# Patient Record
Sex: FEMALE | Race: BLACK OR AFRICAN AMERICAN | Employment: OTHER | URBAN - METROPOLITAN AREA
[De-identification: names, ages, dates, MRNs, and addresses within clinical notes are randomized per-mention and may not be internally consistent; named-entity substitution may affect disease eponyms.]

---

## 2017-02-21 RX ORDER — AMLODIPINE BESYLATE 10 MG/1
TABLET ORAL
Qty: 60 TAB | Refills: 5 | Status: SHIPPED | OUTPATIENT
Start: 2017-02-21 | End: 2018-01-25 | Stop reason: SDUPTHER

## 2017-02-21 NOTE — TELEPHONE ENCOUNTER
Requested Prescriptions     Pending Prescriptions Disp Refills    amLODIPine (NORVASC) 10 mg tablet 60 Tab 5     Sig: TAKE 1 TABLET BY MOUTH DAILY    insulin NPH (HUMULIN N) 100 unit/mL injection 20 mL 5

## 2017-06-29 RX ORDER — ATENOLOL 50 MG/1
TABLET ORAL
Qty: 180 TAB | Refills: 0 | Status: SHIPPED | OUTPATIENT
Start: 2017-06-29 | End: 2017-10-29 | Stop reason: SDUPTHER

## 2017-06-29 NOTE — TELEPHONE ENCOUNTER
Pharmacy request, last OV 11/2016, next scheduled 7/5/17    Pharmacy states they have faxed multiple requests.     .  Requested Prescriptions     Pending Prescriptions Disp Refills    atenolol (TENORMIN) 50 mg tablet 180 Tab 1     Sig: TAKE 1 TABLET BY MOUTH TWO TIMES A DAY

## 2017-07-26 ENCOUNTER — OFFICE VISIT (OUTPATIENT)
Dept: INTERNAL MEDICINE CLINIC | Age: 82
End: 2017-07-26

## 2017-07-26 ENCOUNTER — HOSPITAL ENCOUNTER (OUTPATIENT)
Dept: LAB | Age: 82
Discharge: HOME | End: 2017-07-26
Payer: MEDICARE

## 2017-07-26 VITALS
BODY MASS INDEX: 26.39 KG/M2 | WEIGHT: 158.4 LBS | HEIGHT: 65 IN | OXYGEN SATURATION: 99 % | DIASTOLIC BLOOD PRESSURE: 64 MMHG | RESPIRATION RATE: 16 BRPM | TEMPERATURE: 95.9 F | SYSTOLIC BLOOD PRESSURE: 181 MMHG | HEART RATE: 52 BPM

## 2017-07-26 DIAGNOSIS — Z79.4 TYPE 2 DIABETES MELLITUS WITHOUT COMPLICATION, WITH LONG-TERM CURRENT USE OF INSULIN (HCC): ICD-10-CM

## 2017-07-26 DIAGNOSIS — I10 ESSENTIAL HYPERTENSION: ICD-10-CM

## 2017-07-26 DIAGNOSIS — E78.00 PURE HYPERCHOLESTEROLEMIA: ICD-10-CM

## 2017-07-26 DIAGNOSIS — E11.9 TYPE 2 DIABETES MELLITUS WITHOUT COMPLICATION, WITH LONG-TERM CURRENT USE OF INSULIN (HCC): Primary | ICD-10-CM

## 2017-07-26 DIAGNOSIS — Z79.4 TYPE 2 DIABETES MELLITUS WITHOUT COMPLICATION, WITH LONG-TERM CURRENT USE OF INSULIN (HCC): Primary | ICD-10-CM

## 2017-07-26 DIAGNOSIS — E11.9 TYPE 2 DIABETES MELLITUS WITHOUT COMPLICATION, WITH LONG-TERM CURRENT USE OF INSULIN (HCC): ICD-10-CM

## 2017-07-26 DIAGNOSIS — R21 RASH: ICD-10-CM

## 2017-07-26 LAB
ALBUMIN SERPL BCP-MCNC: 3.4 G/DL (ref 3.4–5)
ALBUMIN/GLOB SERPL: 0.9 {RATIO} (ref 0.8–1.7)
ALP SERPL-CCNC: 188 U/L (ref 45–117)
ALT SERPL-CCNC: 25 U/L (ref 13–56)
ANION GAP BLD CALC-SCNC: 9 MMOL/L (ref 3–18)
AST SERPL W P-5'-P-CCNC: 14 U/L (ref 15–37)
BILIRUB SERPL-MCNC: 0.6 MG/DL (ref 0.2–1)
BUN SERPL-MCNC: 30 MG/DL (ref 7–18)
BUN/CREAT SERPL: 22 (ref 12–20)
CALCIUM SERPL-MCNC: 9.2 MG/DL (ref 8.5–10.1)
CHLORIDE SERPL-SCNC: 104 MMOL/L (ref 100–108)
CHOLEST SERPL-MCNC: 113 MG/DL
CO2 SERPL-SCNC: 26 MMOL/L (ref 21–32)
CREAT SERPL-MCNC: 1.37 MG/DL (ref 0.6–1.3)
CREAT UR-MCNC: 48.15 MG/DL (ref 30–125)
ERYTHROCYTE [DISTWIDTH] IN BLOOD BY AUTOMATED COUNT: 17.8 % (ref 11.6–14.5)
EST. AVERAGE GLUCOSE BLD GHB EST-MCNC: 177 MG/DL
GLOBULIN SER CALC-MCNC: 3.8 G/DL (ref 2–4)
GLUCOSE SERPL-MCNC: 125 MG/DL (ref 74–99)
HBA1C MFR BLD: 7.8 % (ref 4.2–5.6)
HCT VFR BLD AUTO: 33 % (ref 35–45)
HDLC SERPL-MCNC: 42 MG/DL (ref 40–60)
HDLC SERPL: 2.7 {RATIO} (ref 0–5)
HGB BLD-MCNC: 10.6 G/DL (ref 12–16)
LDLC SERPL CALC-MCNC: 55.4 MG/DL (ref 0–100)
LIPID PROFILE,FLP: NORMAL
MCH RBC QN AUTO: 24.6 PG (ref 24–34)
MCHC RBC AUTO-ENTMCNC: 32.1 G/DL (ref 31–37)
MCV RBC AUTO: 76.6 FL (ref 74–97)
MICROALBUMIN UR-MCNC: 31.1 MG/DL (ref 0–3)
MICROALBUMIN/CREAT UR-RTO: 646 MG/G (ref 0–30)
PLATELET # BLD AUTO: 262 K/UL (ref 135–420)
POTASSIUM SERPL-SCNC: 4.1 MMOL/L (ref 3.5–5.5)
PROT SERPL-MCNC: 7.2 G/DL (ref 6.4–8.2)
RBC # BLD AUTO: 4.31 M/UL (ref 4.2–5.3)
SODIUM SERPL-SCNC: 139 MMOL/L (ref 136–145)
TRIGL SERPL-MCNC: 78 MG/DL (ref ?–150)
VLDLC SERPL CALC-MCNC: 15.6 MG/DL
WBC # BLD AUTO: 10.1 K/UL (ref 4.6–13.2)

## 2017-07-26 PROCEDURE — 82043 UR ALBUMIN QUANTITATIVE: CPT | Performed by: INTERNAL MEDICINE

## 2017-07-26 PROCEDURE — 36415 COLL VENOUS BLD VENIPUNCTURE: CPT | Performed by: INTERNAL MEDICINE

## 2017-07-26 PROCEDURE — 80061 LIPID PANEL: CPT | Performed by: INTERNAL MEDICINE

## 2017-07-26 PROCEDURE — 85027 COMPLETE CBC AUTOMATED: CPT | Performed by: INTERNAL MEDICINE

## 2017-07-26 PROCEDURE — 83036 HEMOGLOBIN GLYCOSYLATED A1C: CPT | Performed by: INTERNAL MEDICINE

## 2017-07-26 PROCEDURE — 80053 COMPREHEN METABOLIC PANEL: CPT | Performed by: INTERNAL MEDICINE

## 2017-07-26 RX ORDER — SIMVASTATIN 20 MG/1
TABLET, FILM COATED ORAL
Qty: 45 TAB | Refills: 3 | Status: SHIPPED | OUTPATIENT
Start: 2017-07-26 | End: 2018-04-25 | Stop reason: SDUPTHER

## 2017-07-26 RX ORDER — BETAMETHASONE DIPROPIONATE 0.5 MG/G
CREAM TOPICAL 2 TIMES DAILY
Qty: 45 G | Refills: 1 | Status: SHIPPED | OUTPATIENT
Start: 2017-07-26

## 2017-07-26 NOTE — PROGRESS NOTES
Pt presents today for F/U hypertension, diabetes, patient puja reports having sore on her legs     Pt preferred language for health care discussion is english. Is someone accompanying this pt? no    Is the patient using any DME equipment during [address]? Depression Screening completed. Learning Assessment completed. Abuse Screening completed. Health Maintenance reviewed and discussed per provider. Advance Directive:  1. Do you have an advance directive in place? Patient Reply:     Coordination of Care:  1. Have you been to the ER, urgent care clinic since your last visit? Hospitalized since your last visit? Yes [facility] for hand injury. 2. Have you seen or consulted any other health care providers outside of the [address] [place] [address] since your last visit? Include any pap smears or colon screening.  no

## 2017-07-26 NOTE — PATIENT INSTRUCTIONS

## 2017-07-26 NOTE — MR AVS SNAPSHOT
Visit Information Date & Time Provider Department Dept. Phone Encounter #  
 7/26/2017 11:45 AM Ayad Morris [facility] [phone] [id number] Follow-up Instructions Return in about 3 months (around 10/26/2017) for hypertension. Upcoming Health Maintenance Date Due  
 MEDICARE YEARLY EXAM 6/19/2015 LIPID PANEL Q1 10/29/2016 EYE EXAM RETINAL OR DILATED Q1 12/3/2016 MICROALBUMIN Q1 1/29/2017 HEMOGLOBIN A1C Q6M 5/18/2017 INFLUENZA AGE 9 TO ADULT 8/1/2017 FOOT EXAM Q1 11/18/2017 GLAUCOMA SCREENING Q2Y 12/3/2017 DTaP/Tdap/Td series (2 - Td) 7/26/2027 Allergies as of 7/26/2017  Review Complete On: 7/26/2017 By: Tereza Jaeger LPN Severity Noted Reaction Type Reactions Aspirin  10/29/2015    Nausea Only Bactrim [Sulfamethoprim]  03/18/2014    Nausea Only And dizziness and loss of appetite Pcn [Penicillins]  02/16/2012    Nausea and Vomiting, Hives Sulfamethoxazole-trimethoprim  10/29/2015    Itching Tetanus Toxoid,fluid  10/29/2015    Hives Tetracycline  10/29/2015    Nausea Only Tetracyclines  03/18/2014    Other (comments) Upset stomach Tramadol  08/10/2015    Other (comments) Nausea and vomiting Current Immunizations  Reviewed on 11/18/2016 Name Date Influenza High Dose Vaccine PF 11/18/2016  3:08 PM, 10/29/2015 Influenza Vaccine 9/18/2014 12:32 PM  
 Influenza Vaccine Split 9/19/2012 Pneumococcal Conjugate (PCV-13) 5/4/2016 Pneumococcal Polysaccharide (PPSV-23) 12/17/2013  2:19 PM  
  
 Not reviewed this visit You Were Diagnosed With   
  
 Codes Comments Type 2 diabetes mellitus without complication, with long-term current use of insulin (HCC)    -  Primary ICD-10-CM: E11.9, Z79.4 ICD-9-CM: 250.00, V58.67 Essential hypertension     ICD-10-CM: I10 
ICD-9-CM: 401.9 Pure hypercholesterolemia     ICD-10-CM: E78.00 ICD-9-CM: 272.0 Rash     ICD-10-CM: R21 
ICD-9-CM: 782.1 Vitals BP Pulse Temp Resp Height(growth percentile) Weight(growth percentile) 181/64 (BP 1 Location: Left arm, BP Patient Position: Sitting) (!) 52 95.9 °F (35.5 °C) (Oral) 16 5' 5\" (1.651 m) 158 lb 6.4 oz (71.8 kg) SpO2 BMI OB Status Smoking Status 99% 26.36 kg/m2 Postmenopausal Current Every Day Smoker Vitals History BMI and BSA Data Body Mass Index Body Surface Area  
 26.36 kg/m 2 1.81 m 2 Preferred Pharmacy Pharmacy Name Phone [id number] - [facility] [address] [facility] [phone] Your Updated Medication List  
  
   
This list is accurate as of: 7/26/17 12:23 PM.  Always use your most recent med list. amLODIPine 10 mg tablet Commonly known as:  Mendy Chimes TAKE 1 TABLET BY MOUTH DAILY  
  
 atenolol 50 mg tablet Commonly known as:  TENORMIN  
TAKE 1 TABLET BY MOUTH TWO TIMES A DAY  
  
 betamethasone dipropionate 0.05 % topical cream  
Commonly known as:  Lourena Plater Apply  to affected area two (2) times a day. insulin lispro 100 unit/mL injection Commonly known as:  HUMALOG  
5 units before the  breakfast  
  
 insulin  unit/mL injection Commonly known as:  HumuLIN N  
INJECT 25 UNITS BY SUBCUTANEOUSLY IN THE MORNING AND 15 UNITS SUBCUTANEOUSLY IN THE EVENING  
  
 losartan-hydroCHLOROthiazide 100-25 mg per tablet Commonly known as:  HYZAAR Take 1 Tab by mouth daily. omeprazole 20 mg capsule Commonly known as:  PRILOSEC Take 20 mg by mouth daily. simvastatin 20 mg tablet Commonly known as:  ZOCOR  
TAKE 1 TABLET BY MOUTH EVERY OTHER DAY Prescriptions Sent to Pharmacy Refills  
 simvastatin (ZOCOR) 20 mg tablet 3 Sig: TAKE 1 TABLET BY MOUTH EVERY OTHER DAY Class: Normal  
 Pharmacy: [facility] [address], [address] [facility] Ph #: [phone] betamethasone dipropionate (DIPROSONE) 0.05 % topical cream 1 Sig: Apply  to affected area two (2) times a day. Class: Normal  
 Pharmacy: [facility], [address] [facility] #: [phone] Route: Topical  
  
Follow-up Instructions Return in about 3 months (around 10/26/2017) for hypertension. To-Do List   
 07/26/2017 Lab:  CBC W/O DIFF   
  
 07/26/2017 Lab:  HEMOGLOBIN A1C WITH EAG   
  
 07/26/2017 Lab:  LIPID PANEL   
  
 07/26/2017 Lab:  METABOLIC PANEL, COMPREHENSIVE   
  
 07/26/2017 Lab:  MICROALBUMIN, UR, RAND W/ MICROALBUMIN/CREA RATIO Patient Instructions DASH Diet: Care Instructions Your Care Instructions The DASH diet is an eating plan that can help lower your blood pressure. DASH stands for Dietary Approaches to Stop Hypertension. Hypertension is high blood pressure. The DASH diet focuses on eating foods that are high in calcium, potassium, and magnesium. These nutrients can lower blood pressure. The foods that are highest in these nutrients are fruits, vegetables, low-fat dairy products, nuts, seeds, and legumes. But taking calcium, potassium, and magnesium supplements instead of eating foods that are high in those nutrients does not have the same effect. The DASH diet also includes whole grains, fish, and poultry. The DASH diet is one of several lifestyle changes your doctor may recommend to lower your high blood pressure. Your doctor may also want you to decrease the amount of sodium in your diet. Lowering sodium while following the DASH diet can lower blood pressure even further than just the DASH diet alone. Follow-up care is a key part of your treatment and safety. Be sure to make and go to all appointments, and call your doctor if you are having problems. It's also a good idea to know your test results and keep a list of the medicines you take. How can you care for yourself at home? Following the DASH diet · Eat 4 to 5 servings of fruit each day. A serving is 1 medium-sized piece of fruit, ½ cup chopped or canned fruit, 1/4 cup dried fruit, or 4 ounces (½ cup) of fruit juice. Choose fruit more often than fruit juice. · Eat 4 to 5 servings of vegetables each day. A serving is 1 cup of lettuce or raw leafy vegetables, ½ cup of chopped or cooked vegetables, or 4 ounces (½ cup) of vegetable juice. Choose vegetables more often than vegetable juice. · Get 2 to 3 servings of low-fat and fat-free dairy each day. A serving is 8 ounces of milk, 1 cup of yogurt, or 1 ½ ounces of cheese. · Eat 6 to 8 servings of grains each day. A serving is 1 slice of bread, 1 ounce of dry cereal, or ½ cup of cooked rice, pasta, or cooked cereal. Try to choose whole-grain products as much as possible. · Limit lean meat, poultry, and fish to 2 servings each day. A serving is 3 ounces, about the size of a deck of cards. · Eat 4 to 5 servings of nuts, seeds, and legumes (cooked dried beans, lentils, and split peas) each week. A serving is 1/3 cup of nuts, 2 tablespoons of seeds, or ½ cup of cooked beans or peas. · Limit fats and oils to 2 to 3 servings each day. A serving is 1 teaspoon of vegetable oil or 2 tablespoons of salad dressing. · Limit sweets and added sugars to 5 servings or less a week. A serving is 1 tablespoon jelly or jam, ½ cup sorbet, or 1 cup of lemonade. · Eat less than 2,300 milligrams (mg) of sodium a day. If you limit your sodium to 1,500 mg a day, you can lower your blood pressure even more. Tips for success · Start small. Do not try to make dramatic changes to your diet all at once. You might feel that you are missing out on your favorite foods and then be more likely to not follow the plan. Make small changes, and stick with them. Once those changes become habit, add a few more changes. · Try some of the following: ¨ Make it a goal to eat a fruit or vegetable at every meal and at snacks. This will make it easy to get the recommended amount of fruits and vegetables each day. ¨ Try yogurt topped with fruit and nuts for a snack or healthy dessert. ¨ Add lettuce, tomato, cucumber, and onion to sandwiches. ¨ Combine a ready-made pizza crust with low-fat mozzarella cheese and lots of vegetable toppings. Try using tomatoes, squash, spinach, broccoli, carrots, cauliflower, and onions. ¨ Have a variety of cut-up vegetables with a low-fat dip as an appetizer instead of chips and dip. ¨ Sprinkle sunflower seeds or chopped almonds over salads. Or try adding chopped walnuts or almonds to cooked vegetables. ¨ Try some vegetarian meals using beans and peas. Add garbanzo or kidney beans to salads. Make burritos and tacos with mashed brooks beans or black beans. Where can you learn more? Go to http://mukund-kun.info/. Enter U881 in the search box to learn more about \"DASH Diet: Care Instructions. \" Current as of: April 3, 2017 Content Version: 11.3 © 6700-5210 [employer]. Care instructions adapted under license by [employer] (which disclaims liability or warranty for this information). If you have questions about a medical condition or this instruction, always ask your healthcare professional. [place] [address] any warranty or liability for your use of this information. Introducing [facility]! Disha Lee introduces [employer]uto patient portal. Now you can access parts of your medical record, email your doctor's office, and request medication refills online. 1. In your internet browser, go to [email or web address] 2. Click on the First Time User? Click Here link in the Sign In box. You will see the New Member Sign Up page. 3. Enter your [employer]uto Access Code exactly as it appears below. You will not need to use this code after youve completed the sign-up process.  If you do not sign up before the expiration date, you must request a new code. · BUKA Access Code: [email or web address] Expires: 10/24/2017 12:22 PM 
 
4. Enter the last four digits of your Social Security Number (xxxx) and Date of Birth (mm/dd/yyyy) as indicated and click Submit. You will be taken to the next sign-up page. 5. Create a BUKA ID. This will be your BUKA login ID and cannot be changed, so think of one that is secure and easy to remember. 6. Create a BUKA password. You can change your password at any time. 7. Enter your Password Reset Question and Answer. This can be used at a later time if you forget your password. 8. Enter your e-mail address. You will receive e-mail notification when new information is available in [address] 9. Click Sign Up. You can now view and download portions of your medical record. 10. Click the Download Summary menu link to download a portable copy of your medical information. If you have questions, please visit the Frequently Asked Questions section of the BUKA website. Remember, BUKA is NOT to be used for urgent needs. For medical emergencies, dial 911. Now available from your iPhone and Android! Please provide this summary of care documentation to your next provider. Your primary care clinician is listed as Alison Singer. If you have any questions after today's visit, please call [phone].

## 2017-07-26 NOTE — PROGRESS NOTES
HISTORY OF PRESENT ILLNESS  Tan Mota is a 80 y.o. female. HPI Comments: 79 yo female here for f/u of DM, HTN, HLD. HTN always elevated at visits. She checks at home with SBP readings typically in 150s. No CP, SOB. On multiple medications. HLD: tolerates simvastatin if taken every other day. Needs refill  DM has been doing well at home ranging . She has noticed rash B calves over the past few months. Has been using OTC hydrocortisone, hydrogen peroxide without improvement. + pruritus. Review of Systems   Constitutional: Negative for chills, fever and weight loss. HENT: Negative for congestion. Eyes: Negative for blurred vision and pain. Respiratory: Negative for cough and shortness of breath. Cardiovascular: Positive for leg swelling (trace). Negative for chest pain and palpitations. Gastrointestinal: Negative for heartburn, nausea and vomiting. Musculoskeletal: Negative for myalgias. Skin: Positive for itching and rash. Neurological: Negative for dizziness, tingling and headaches. Psychiatric/Behavioral: Negative for depression. The patient is not nervous/anxious. Past Medical History:   Diagnosis Date    Diabetes ([facility] [address]     Hypertension     Other and unspecified hyperlipidemia 2/16/2012     Current Outpatient Prescriptions on File Prior to Visit   Medication Sig Dispense Refill    atenolol (TENORMIN) 50 mg tablet TAKE 1 TABLET BY MOUTH TWO TIMES A  Tab 0    amLODIPine (NORVASC) 10 mg tablet TAKE 1 TABLET BY MOUTH DAILY 60 Tab 5    insulin NPH (HUMULIN N) 100 unit/mL injection INJECT 25 UNITS BY SUBCUTANEOUSLY IN THE MORNING AND 15 UNITS SUBCUTANEOUSLY IN THE EVENING 20 mL 5    insulin lispro (HUMALOG) 100 unit/mL injection 5 units before the  breakfast 1 Vial 5    losartan-hydrochlorothiazide (HYZAAR) 100-25 mg per tablet Take 1 Tab by mouth daily.  90 Tab 5    simvastatin (ZOCOR) 20 mg tablet TAKE 1 TABLET BY MOUTH EVERY OTHER DAY 15 Tab 5    naproxen (NAPROSYN) 375 mg tablet Take 1 Tab by mouth two (2) times daily (with meals). 60 Tab 0    HYDROcodone-acetaminophen (NORCO) 7.5-325 mg per tablet Take 1 Tab by mouth every six (6) hours as needed for Pain. Max Daily Amount: 4 Tabs. 100 Tab 0    omeprazole (PRILOSEC) 20 mg capsule Take 20 mg by mouth daily.  ferrous sulfate 325 mg (65 mg iron) tablet Take 1 tablet by mouth Daily (before breakfast). 90 tablet 5     No current facility-administered medications on file prior to visit. Social History   Substance Use Topics    Smoking status: Current Every Day Smoker     Packs/day: 0.50     Years: 40.00    Smokeless tobacco: Never Used    Alcohol use No     Physical Exam   Constitutional: She appears well-developed and well-nourished. No distress. /64 (BP 1 Location: Left arm, BP Patient Position: Sitting)  Pulse (!) 52  Temp 95.9 °F (35.5 °C) (Oral)   Resp 16  Ht 5' 5\" (1.651 m)  Wt 158 lb 6.4 oz (71.8 kg)  SpO2 99%  BMI 26.36 kg/m2     Eyes: EOM are normal. Right eye exhibits no discharge. Left eye exhibits no discharge. No scleral icterus. Neck: Neck supple. Cardiovascular: Normal rate, regular rhythm and normal heart sounds. Exam reveals no gallop and no friction rub. No murmur heard. Pulmonary/Chest: Effort normal and breath sounds normal. No respiratory distress. She has no wheezes. She has no rales. Musculoskeletal: She exhibits edema (trace). She exhibits no tenderness. Lymphadenopathy:     She has no cervical adenopathy. Neurological: She is alert. She exhibits normal muscle tone. Skin: Skin is warm and dry. Psychiatric: She has a normal mood and affect.      Lab Results   Component Value Date/Time    Hemoglobin A1c 8.8 09/18/2014 12:17 PM    Hemoglobin A1c (POC) 7.7 11/18/2016 03:07 PM     Lab Results   Component Value Date/Time    Cholesterol, total 137 10/29/2015 02:02 PM    HDL Cholesterol 31 10/29/2015 02:02 PM    LDL, calculated 85 10/29/2015 02:02 PM    VLDL, calculated 21 10/29/2015 02:02 PM    Triglyceride 107 10/29/2015 02:02 PM     Lab Results   Component Value Date/Time    Sodium 138 08/17/2016 02:36 PM    Potassium 4.1 08/17/2016 02:36 PM    Chloride 103 08/17/2016 02:36 PM    CO2 27 08/17/2016 02:36 PM    Anion gap 8 08/17/2016 02:36 PM    Glucose 141 08/17/2016 02:36 PM    BUN 23 08/17/2016 02:36 PM    Creatinine 1.32 08/17/2016 02:36 PM    BUN/Creatinine ratio 17 08/17/2016 02:36 PM    GFR est AA 47 08/17/2016 02:36 PM    GFR est non-AA 38 08/17/2016 02:36 PM    Calcium 9.5 08/17/2016 02:36 PM    Bilirubin, total 0.6 08/17/2016 02:36 PM    AST (SGOT) 15 08/17/2016 02:36 PM    Alk. phosphatase 179 08/17/2016 02:36 PM    Protein, total 7.2 08/17/2016 02:36 PM    Albumin 3.5 08/17/2016 02:36 PM    Globulin 3.7 08/17/2016 02:36 PM    A-G Ratio 0.9 08/17/2016 02:36 PM    ALT (SGPT) 25 08/17/2016 02:36 PM     ASSESSMENT and PLAN    ICD-10-CM ICD-9-CM    1. Type 2 diabetes mellitus without complication, with long-term current use of insulin (HCC) E11.9 250.00 HEMOGLOBIN A1C WITH EAG    Z79.4 V58.67 MICROALBUMIN, UR, RAND W/ MICROALBUMIN/CREA RATIO   2. Essential hypertension H86 355.6 METABOLIC PANEL, COMPREHENSIVE      CBC W/O DIFF   3. Pure hypercholesterolemia E78.00 272.0 LIPID PANEL   4. Rash R21 782.1    Repeat labs today. Discussed switching atenolol to metoprolol to see if this improves BP, but she prefers to monitor on current medication. Will call if BP elevates. Simvastatin refilled. Will try betamethasone for rash. She will call if this is not effective.

## 2017-07-31 NOTE — PROGRESS NOTES
Please let her know her kidney function has is stable, but she has protein in her urine. Has she seen a nephrologist in the past? If not, is she willing to see one? It is important to control her blood pressure and diabetes.

## 2017-08-01 ENCOUNTER — TELEPHONE (OUTPATIENT)
Dept: INTERNAL MEDICINE CLINIC | Age: 82
End: 2017-08-01

## 2017-08-01 DIAGNOSIS — R80.9 CONTROLLED TYPE 2 DIABETES MELLITUS WITH MICROALBUMINURIA, WITH LONG-TERM CURRENT USE OF INSULIN (HCC): Primary | ICD-10-CM

## 2017-08-01 DIAGNOSIS — Z79.4 CONTROLLED TYPE 2 DIABETES MELLITUS WITH MICROALBUMINURIA, WITH LONG-TERM CURRENT USE OF INSULIN (HCC): Primary | ICD-10-CM

## 2017-08-01 DIAGNOSIS — E11.29 CONTROLLED TYPE 2 DIABETES MELLITUS WITH MICROALBUMINURIA, WITH LONG-TERM CURRENT USE OF INSULIN (HCC): Primary | ICD-10-CM

## 2017-08-01 NOTE — TELEPHONE ENCOUNTER
2 pt. Identifiers confirmed. Pt. Notified of below. Pt. States she has not seen a nephrologist and is okay wit a referral to f/u. No other questions/concerns at this time.

## 2017-08-01 NOTE — TELEPHONE ENCOUNTER
----- Message from Julio Adrian MD sent at 7/31/2017  3:52 PM EDT -----  Please let her know her kidney function has is stable, but she has protein in her urine. Has she seen a nephrologist in the past? If not, is she willing to see one? It is important to control her blood pressure and diabetes.

## 2017-10-26 ENCOUNTER — OFFICE VISIT (OUTPATIENT)
Dept: INTERNAL MEDICINE CLINIC | Age: 82
End: 2017-10-26

## 2017-10-26 VITALS
RESPIRATION RATE: 12 BRPM | HEART RATE: 53 BPM | BODY MASS INDEX: 26.62 KG/M2 | SYSTOLIC BLOOD PRESSURE: 168 MMHG | TEMPERATURE: 97 F | DIASTOLIC BLOOD PRESSURE: 61 MMHG | HEIGHT: 65 IN | WEIGHT: 159.8 LBS | OXYGEN SATURATION: 98 %

## 2017-10-26 DIAGNOSIS — Z79.4 TYPE 2 DIABETES MELLITUS WITHOUT COMPLICATION, WITH LONG-TERM CURRENT USE OF INSULIN (HCC): ICD-10-CM

## 2017-10-26 DIAGNOSIS — E11.9 TYPE 2 DIABETES MELLITUS WITHOUT COMPLICATION, WITH LONG-TERM CURRENT USE OF INSULIN (HCC): ICD-10-CM

## 2017-10-26 DIAGNOSIS — N18.2 CKD (CHRONIC KIDNEY DISEASE) STAGE 2, GFR 60-89 ML/MIN: ICD-10-CM

## 2017-10-26 DIAGNOSIS — Z23 ENCOUNTER FOR IMMUNIZATION: ICD-10-CM

## 2017-10-26 DIAGNOSIS — I10 ESSENTIAL HYPERTENSION: Primary | ICD-10-CM

## 2017-10-26 NOTE — PROGRESS NOTES
Pt presents today for F/U hypertension     Pt preferred language for health care discussion is english. Is someone accompanying this pt? no    Depression Screening completed. yes    Health Maintenance reviewed and discussed per provider. yes    Advance Directive:  1. Do you have an advance directive in place? Patient Reply:No patient has paper at home. Coordination of Care:  1. Have you been to the ER, urgent care clinic since your last visit? Hospitalized since your last visit? no    2. Have you seen or consulted any other health care providers outside of the [address] [place] [address] since your last visit? Include any pap smears or colon screening.  no

## 2017-10-26 NOTE — MR AVS SNAPSHOT
Visit Information Date & Time Provider Department Dept. Phone Encounter #  
 10/26/2017  1:00 PM Jenifer Lucas, [address] [phone] [id number] Follow-up Instructions Return in about 3 months (around 1/26/2018), or if symptoms worsen or fail to improve, for Medicare Wellness Visit, diabetes, hypertension. Upcoming Health Maintenance Date Due  
 FOOT EXAM Q1 11/18/2017 MEDICARE YEARLY EXAM 12/1/2017* EYE EXAM RETINAL OR DILATED Q1 12/15/2017 HEMOGLOBIN A1C Q6M 1/26/2018 MICROALBUMIN Q1 7/26/2018 LIPID PANEL Q1 7/26/2018 GLAUCOMA SCREENING Q2Y 12/15/2018 DTaP/Tdap/Td series (2 - Td) 7/26/2027 *Topic was postponed. The date shown is not the original due date. Allergies as of 10/26/2017  Review Complete On: 10/26/2017 By: Jermaine Dunn LPN Severity Noted Reaction Type Reactions Aspirin  10/29/2015    Nausea Only Bactrim [Sulfamethoprim]  03/18/2014    Nausea Only And dizziness and loss of appetite Pcn [Penicillins]  02/16/2012    Nausea and Vomiting, Hives Sulfamethoxazole-trimethoprim  10/29/2015    Itching Tetanus Toxoid,fluid  10/29/2015    Hives Tetracycline  10/29/2015    Nausea Only Tetracyclines  03/18/2014    Other (comments) Upset stomach Tramadol  08/10/2015    Other (comments) Nausea and vomiting Current Immunizations  Reviewed on 11/18/2016 Name Date Influenza High Dose Vaccine PF  Incomplete, 11/18/2016  3:08 PM, 10/29/2015 Influenza Vaccine 9/18/2014 12:32 PM  
 Influenza Vaccine Split 9/19/2012 Pneumococcal Conjugate (PCV-13) 5/4/2016 Pneumococcal Polysaccharide (PPSV-23) 12/17/2013  2:19 PM  
  
 Not reviewed this visit You Were Diagnosed With   
  
 Codes Comments Essential hypertension    -  Primary ICD-10-CM: I10 
ICD-9-CM: 401.9  Type 2 diabetes mellitus without complication, with long-term current use of insulin ([facility]     ICD-10-CM: E11.9, Z79.4 ICD-9-CM: 250.00, V58.67 CKD (chronic kidney disease) stage 2, GFR 60-89 ml/min     ICD-10-CM: N18.2 ICD-9-CM: 585.2 Encounter for immunization     ICD-10-CM: P35 ICD-9-CM: V03.89 Vitals BP Pulse Temp Resp Height(growth percentile) Weight(growth percentile)  
 168/61 (BP 1 Location: Left arm, BP Patient Position: Sitting) (!) 53 97 °F (36.1 °C) (Oral) 12 5' 5\" (1.651 m) 159 lb 12.8 oz (72.5 kg) SpO2 BMI OB Status Smoking Status 98% 26.59 kg/m2 Postmenopausal Current Every Day Smoker Vitals History BMI and BSA Data Body Mass Index Body Surface Area  
 26.59 kg/m 2 1.82 m 2 Preferred Pharmacy Pharmacy Name Phone [facility][id number] - [place] [address] [address] [facility] [phone] Your Updated Medication List  
  
   
This list is accurate as of: 10/26/17  1:33 PM.  Always use your most recent med list. amLODIPine 10 mg tablet Commonly known as:  [place] TAKE 1 TABLET BY MOUTH DAILY  
  
 atenolol 50 mg tablet Commonly known as:  TENORMIN  
TAKE 1 TABLET BY MOUTH TWO TIMES A DAY  
  
 betamethasone dipropionate 0.05 % topical cream  
Commonly known as:  [place] Sax Apply  to affected area two (2) times a day. insulin lispro 100 unit/mL injection Commonly known as:  HUMALOG  
5 units before the  breakfast  
  
 insulin  unit/mL injection Commonly known as:  HumuLIN N  
INJECT 25 UNITS BY SUBCUTANEOUSLY IN THE MORNING AND 15 UNITS SUBCUTANEOUSLY IN THE EVENING  
  
 losartan-hydroCHLOROthiazide 100-25 mg per tablet Commonly known as:  HYZAAR Take 1 Tab by mouth daily. simvastatin 20 mg tablet Commonly known as:  ZOCOR  
TAKE 1 TABLET BY MOUTH EVERY OTHER DAY We Performed the Following INFLUENZA VIRUS VACCINE, HIGH DOSE SEASONAL, PRESERVATIVE FREE [60559 CPT(R)] Follow-up Instructions Return in about 3 months (around 1/26/2018), or if symptoms worsen or fail to improve, for Medicare Wellness Visit, diabetes, hypertension. Patient Instructions DASH Diet: Care Instructions Your Care Instructions The DASH diet is an eating plan that can help lower your blood pressure. DASH stands for Dietary Approaches to Stop Hypertension. Hypertension is high blood pressure. The DASH diet focuses on eating foods that are high in calcium, potassium, and magnesium. These nutrients can lower blood pressure. The foods that are highest in these nutrients are fruits, vegetables, low-fat dairy products, nuts, seeds, and legumes. But taking calcium, potassium, and magnesium supplements instead of eating foods that are high in those nutrients does not have the same effect. The DASH diet also includes whole grains, fish, and poultry. The DASH diet is one of several lifestyle changes your doctor may recommend to lower your high blood pressure. Your doctor may also want you to decrease the amount of sodium in your diet. Lowering sodium while following the DASH diet can lower blood pressure even further than just the DASH diet alone. Follow-up care is a key part of your treatment and safety. Be sure to make and go to all appointments, and call your doctor if you are having problems. It's also a good idea to know your test results and keep a list of the medicines you take. How can you care for yourself at home? Following the DASH diet · Eat 4 to 5 servings of fruit each day. A serving is 1 medium-sized piece of fruit, ½ cup chopped or canned fruit, 1/4 cup dried fruit, or 4 ounces (½ cup) of fruit juice. Choose fruit more often than fruit juice. · Eat 4 to 5 servings of vegetables each day. A serving is 1 cup of lettuce or raw leafy vegetables, ½ cup of chopped or cooked vegetables, or 4 ounces (½ cup) of vegetable juice. Choose vegetables more often than vegetable juice. · Get 2 to 3 servings of low-fat and fat-free dairy each day. A serving is 8 ounces of milk, 1 cup of yogurt, or 1 ½ ounces of cheese. · Eat 6 to 8 servings of grains each day. A serving is 1 slice of bread, 1 ounce of dry cereal, or ½ cup of cooked rice, pasta, or cooked cereal. Try to choose whole-grain products as much as possible. · Limit lean meat, poultry, and fish to 2 servings each day. A serving is 3 ounces, about the size of a deck of cards. · Eat 4 to 5 servings of nuts, seeds, and legumes (cooked dried beans, lentils, and split peas) each week. A serving is 1/3 cup of nuts, 2 tablespoons of seeds, or ½ cup of cooked beans or peas. · Limit fats and oils to 2 to 3 servings each day. A serving is 1 teaspoon of vegetable oil or 2 tablespoons of salad dressing. · Limit sweets and added sugars to 5 servings or less a week. A serving is 1 tablespoon jelly or jam, ½ cup sorbet, or 1 cup of lemonade. · Eat less than 2,300 milligrams (mg) of sodium a day. If you limit your sodium to 1,500 mg a day, you can lower your blood pressure even more. Tips for success · Start small. Do not try to make dramatic changes to your diet all at once. You might feel that you are missing out on your favorite foods and then be more likely to not follow the plan. Make small changes, and stick with them. Once those changes become habit, add a few more changes. · Try some of the following: ¨ Make it a goal to eat a fruit or vegetable at every meal and at snacks. This will make it easy to get the recommended amount of fruits and vegetables each day. ¨ Try yogurt topped with fruit and nuts for a snack or healthy dessert. ¨ Add lettuce, tomato, cucumber, and onion to sandwiches. ¨ Combine a ready-made pizza crust with low-fat mozzarella cheese and lots of vegetable toppings. Try using tomatoes, squash, spinach, broccoli, carrots, cauliflower, and onions. ¨ Have a variety of cut-up vegetables with a low-fat dip as an appetizer instead of chips and dip. ¨ Sprinkle sunflower seeds or chopped almonds over salads. Or try adding chopped walnuts or almonds to cooked vegetables. ¨ Try some vegetarian meals using beans and peas. Add garbanzo or kidney beans to salads. Make burritos and tacos with mashed brooks beans or black beans. Where can you learn more? Go to http://mukund-kun.info/. Enter C641 in the search box to learn more about \"DASH Diet: Care Instructions. \" Current as of: September 21, 2016 Content Version: 11.4 © 5902-0125 [employer]. Care instructions adapted under license by [employer] (which disclaims liability or warranty for this information). If you have questions about a medical condition or this instruction, always ask your healthcare professional. [place] [address] any warranty or liability for your use of this information. Introducing [facility] & HEALTH SERVICES! Ozzie White introduces [employer] patient portal. Now you can access parts of your medical record, email your doctor's office, and request medication refills online. 1. In your internet browser, go to [email or web address] 2. Click on the First Time User? Click Here link in the Sign In box. You will see the New Member Sign Up page. 3. Enter your [employer] Access Code exactly as it appears below. You will not need to use this code after youve completed the sign-up process. If you do not sign up before the expiration date, you must request a new code. · [employer] Access Code: [email or web address] Expires: 1/24/2018  1:32 PM 
 
4. Enter the last four digits of your Social Security Number (xxxx) and Date of Birth (mm/dd/yyyy) as indicated and click Submit. You will be taken to the next sign-up page. 5. Create a [employer] ID.  This will be your [employer] login ID and cannot be changed, so think of one that is secure and easy to remember. 6. Create a [employer] password. You can change your password at any time. 7. Enter your Password Reset Question and Answer. This can be used at a later time if you forget your password. 8. Enter your e-mail address. You will receive e-mail notification when new information is available in [address] 9. Click Sign Up. You can now view and download portions of your medical record. 10. Click the Download Summary menu link to download a portable copy of your medical information. If you have questions, please visit the Frequently Asked Questions section of the [employer] website. Remember, [employer] is NOT to be used for urgent needs. For medical emergencies, dial 911. Now available from your iPhone and Android! Please provide this summary of care documentation to your next provider. Your primary care clinician is listed as Bruce Lainez. If you have any questions after today's visit, please call [phone].

## 2017-10-26 NOTE — PROGRESS NOTES
HISTORY OF PRESENT ILLNESS  Deandre Rand is a 80 y.o. female. HPI Comments: 79 yo female here for f/u of HTN, DM, CKD. She wishes to hold on seeing nephrology at this time. She monitors her BP at home which ranges from 130s-160/60-70s at home. Denies CP, SOB. DM remains controlled when checked at home. Consistently < 150. Did have elevated microalbumin last visit. Review of Systems   Constitutional: Negative for chills, fever and weight loss. HENT: Negative for congestion and ear pain. Eyes: Negative for blurred vision and pain. Respiratory: Negative for cough and shortness of breath. Cardiovascular: Negative for chest pain, palpitations and leg swelling. Gastrointestinal: Negative for nausea and vomiting. Genitourinary: Negative for frequency and urgency. Musculoskeletal: Negative for joint pain and myalgias. Skin: Negative for itching and rash. Neurological: Negative for dizziness, tingling and headaches. Psychiatric/Behavioral: Negative for depression. The patient is not nervous/anxious. Past Medical History:   Diagnosis Date    Diabetes ([address]     Hypertension     Other and unspecified hyperlipidemia 2/16/2012     Current Outpatient Prescriptions on File Prior to Visit   Medication Sig Dispense Refill    simvastatin (ZOCOR) 20 mg tablet TAKE 1 TABLET BY MOUTH EVERY OTHER DAY 45 Tab 3    betamethasone dipropionate (DIPROSONE) 0.05 % topical cream Apply  to affected area two (2) times a day.  45 g 1    atenolol (TENORMIN) 50 mg tablet TAKE 1 TABLET BY MOUTH TWO TIMES A  Tab 0    amLODIPine (NORVASC) 10 mg tablet TAKE 1 TABLET BY MOUTH DAILY 60 Tab 5    insulin NPH (HUMULIN N) 100 unit/mL injection INJECT 25 UNITS BY SUBCUTANEOUSLY IN THE MORNING AND 15 UNITS SUBCUTANEOUSLY IN THE EVENING 20 mL 5    insulin lispro (HUMALOG) 100 unit/mL injection 5 units before the  breakfast 1 Vial 5    losartan-hydrochlorothiazide (HYZAAR) 100-25 mg per tablet Take 1 Tab by mouth daily. 90 Tab 5     No current facility-administered medications on file prior to visit. Social History   Substance Use Topics    Smoking status: Current Every Day Smoker     Packs/day: 0.50     Years: 40.00    Smokeless tobacco: Never Used    Alcohol use No     Physical Exam   Constitutional: She appears well-developed and well-nourished. No distress. /61 (BP 1 Location: Left arm, BP Patient Position: Sitting)  Pulse (!) 53  Temp 97 °F (36.1 °C) (Oral)   Resp 12  Ht 5' 5\" (1.651 m)  Wt 159 lb 12.8 oz (72.5 kg)  SpO2 98%  BMI 26.59 kg/m2     Eyes: EOM are normal. Right eye exhibits no discharge. Left eye exhibits no discharge. No scleral icterus. Neck: Neck supple. Cardiovascular: Normal rate, regular rhythm and normal heart sounds. Exam reveals no gallop and no friction rub. No murmur heard. Pulmonary/Chest: Effort normal and breath sounds normal. No respiratory distress. She has no wheezes. She has no rales. Musculoskeletal: She exhibits no edema or tenderness. Lymphadenopathy:     She has no cervical adenopathy. Neurological: She is alert. She exhibits normal muscle tone. Skin: Skin is warm and dry. Psychiatric: She has a normal mood and affect. Lab Results   Component Value Date/Time    Hemoglobin A1c 7.8 07/26/2017 12:33 PM    Hemoglobin A1c (POC) 7.7 11/18/2016 03:07 PM     Lab Results   Component Value Date/Time    Sodium 139 07/26/2017 12:33 PM    Potassium 4.1 07/26/2017 12:33 PM    Chloride 104 07/26/2017 12:33 PM    CO2 26 07/26/2017 12:33 PM    Anion gap 9 07/26/2017 12:33 PM    Glucose 125 07/26/2017 12:33 PM    BUN 30 07/26/2017 12:33 PM    Creatinine 1.37 07/26/2017 12:33 PM    BUN/Creatinine ratio 22 07/26/2017 12:33 PM    GFR est AA 45 07/26/2017 12:33 PM    GFR est non-AA 37 07/26/2017 12:33 PM    Calcium 9.2 07/26/2017 12:33 PM    Bilirubin, total 0.6 07/26/2017 12:33 PM    AST (SGOT) 14 07/26/2017 12:33 PM    Alk.  phosphatase 188 07/26/2017 12:33 PM    Protein, total 7.2 07/26/2017 12:33 PM    Albumin 3.4 07/26/2017 12:33 PM    Globulin 3.8 07/26/2017 12:33 PM    A-G Ratio 0.9 07/26/2017 12:33 PM    ALT (SGPT) 25 07/26/2017 12:33 PM       ASSESSMENT and PLAN    ICD-10-CM ICD-9-CM    1. Essential hypertension I10 401.9    2. Type 2 diabetes mellitus without complication, with long-term current use of insulin (HCC) E11.9 250.00     Z79.4 V58.67    3. CKD (chronic kidney disease) stage 2, GFR 60-89 ml/min N18.2 585.2    4. Encounter for immunization Z23 V03.89 INFLUENZA VIRUS VACCINE, HIGH DOSE SEASONAL, PRESERVATIVE FREE     Discussed possible medication change for improved BP control. She prefers to wait this time. Will monitor at home BID for next week or so and let me know if BP remains > 150. Discussed recent microalbumin level increased and need for continue control of HTN, DM  Glc is well controlled on home monitoring  Flu shot today.

## 2017-10-26 NOTE — PATIENT INSTRUCTIONS

## 2017-10-29 RX ORDER — ATENOLOL 50 MG/1
TABLET ORAL
Qty: 180 TAB | Refills: 0 | Status: SHIPPED | OUTPATIENT
Start: 2017-10-29 | End: 2018-01-25 | Stop reason: SDUPTHER

## 2017-12-15 NOTE — TELEPHONE ENCOUNTER
Requested Prescriptions     Pending Prescriptions Disp Refills    insulin NPH (HUMULIN N) 100 unit/mL injection 20 mL 5     Sig: INJECT 25 UNITS BY SUBCUTANEOUSLY IN THE MORNING AND 15 UNITS SUBCUTANEOUSLY IN THE EVENING

## 2017-12-28 ENCOUNTER — TELEPHONE (OUTPATIENT)
Dept: INTERNAL MEDICINE CLINIC | Age: 82
End: 2017-12-28

## 2017-12-28 DIAGNOSIS — I10 ESSENTIAL HYPERTENSION: ICD-10-CM

## 2017-12-28 RX ORDER — LOSARTAN POTASSIUM AND HYDROCHLOROTHIAZIDE 25; 100 MG/1; MG/1
1 TABLET ORAL DAILY
Qty: 90 TAB | Refills: 5 | Status: SHIPPED | OUTPATIENT
Start: 2017-12-28 | End: 2019-03-23 | Stop reason: SDUPTHER

## 2017-12-28 NOTE — TELEPHONE ENCOUNTER
Pharmacy requesting New Order for BATOOL TRISTAN ANISHA ORAL -25mg. Prescription requires new ICD-10 code to be reordered.     Preferred Pharmacy is [address] [place] [address] #[id number]

## 2018-01-25 ENCOUNTER — OFFICE VISIT (OUTPATIENT)
Dept: INTERNAL MEDICINE CLINIC | Age: 83
End: 2018-01-25

## 2018-01-25 ENCOUNTER — HOSPITAL ENCOUNTER (OUTPATIENT)
Dept: LAB | Age: 83
Discharge: HOME | End: 2018-01-25
Payer: MEDICARE

## 2018-01-25 VITALS
OXYGEN SATURATION: 97 % | BODY MASS INDEX: 26.49 KG/M2 | HEIGHT: 65 IN | DIASTOLIC BLOOD PRESSURE: 63 MMHG | SYSTOLIC BLOOD PRESSURE: 168 MMHG | HEART RATE: 52 BPM | RESPIRATION RATE: 15 BRPM | WEIGHT: 159 LBS | TEMPERATURE: 96.8 F

## 2018-01-25 DIAGNOSIS — D64.9 ANEMIA, UNSPECIFIED TYPE: ICD-10-CM

## 2018-01-25 DIAGNOSIS — E11.21 TYPE 2 DIABETES MELLITUS WITH NEPHROPATHY (HCC): ICD-10-CM

## 2018-01-25 DIAGNOSIS — H61.23 CERUMEN DEBRIS ON TYMPANIC MEMBRANE OF BOTH EARS: ICD-10-CM

## 2018-01-25 DIAGNOSIS — E78.00 PURE HYPERCHOLESTEROLEMIA: ICD-10-CM

## 2018-01-25 DIAGNOSIS — I10 ESSENTIAL HYPERTENSION: ICD-10-CM

## 2018-01-25 DIAGNOSIS — Z00.00 MEDICARE ANNUAL WELLNESS VISIT, SUBSEQUENT: Primary | ICD-10-CM

## 2018-01-25 LAB
ALBUMIN SERPL-MCNC: 3.4 G/DL (ref 3.4–5)
ALBUMIN/GLOB SERPL: 0.9 {RATIO} (ref 0.8–1.7)
ALP SERPL-CCNC: 229 U/L (ref 45–117)
ALT SERPL-CCNC: 20 U/L (ref 13–56)
ANION GAP SERPL CALC-SCNC: 7 MMOL/L (ref 3–18)
AST SERPL-CCNC: 12 U/L (ref 15–37)
BILIRUB SERPL-MCNC: 0.8 MG/DL (ref 0.2–1)
BUN SERPL-MCNC: 20 MG/DL (ref 7–18)
BUN/CREAT SERPL: 15 (ref 12–20)
CALCIUM SERPL-MCNC: 9 MG/DL (ref 8.5–10.1)
CHLORIDE SERPL-SCNC: 107 MMOL/L (ref 100–108)
CHOLEST SERPL-MCNC: 166 MG/DL
CO2 SERPL-SCNC: 28 MMOL/L (ref 21–32)
CREAT SERPL-MCNC: 1.32 MG/DL (ref 0.6–1.3)
ERYTHROCYTE [DISTWIDTH] IN BLOOD BY AUTOMATED COUNT: 18.3 % (ref 11.6–14.5)
EST. AVERAGE GLUCOSE BLD GHB EST-MCNC: 163 MG/DL
GLOBULIN SER CALC-MCNC: 3.7 G/DL (ref 2–4)
GLUCOSE SERPL-MCNC: 57 MG/DL (ref 74–99)
HBA1C MFR BLD: 7.3 % (ref 4.2–5.6)
HCT VFR BLD AUTO: 34.8 % (ref 35–45)
HDLC SERPL-MCNC: 42 MG/DL (ref 40–60)
HDLC SERPL: 4 {RATIO} (ref 0–5)
HGB BLD-MCNC: 11.2 G/DL (ref 12–16)
LDLC SERPL CALC-MCNC: 101.8 MG/DL (ref 0–100)
LIPID PROFILE,FLP: ABNORMAL
MCH RBC QN AUTO: 25.1 PG (ref 24–34)
MCHC RBC AUTO-ENTMCNC: 32.2 G/DL (ref 31–37)
MCV RBC AUTO: 77.9 FL (ref 74–97)
PLATELET # BLD AUTO: 250 K/UL (ref 135–420)
POTASSIUM SERPL-SCNC: 4.2 MMOL/L (ref 3.5–5.5)
PROT SERPL-MCNC: 7.1 G/DL (ref 6.4–8.2)
RBC # BLD AUTO: 4.47 M/UL (ref 4.2–5.3)
SODIUM SERPL-SCNC: 142 MMOL/L (ref 136–145)
TRIGL SERPL-MCNC: 111 MG/DL (ref ?–150)
VLDLC SERPL CALC-MCNC: 22.2 MG/DL
WBC # BLD AUTO: 10.1 K/UL (ref 4.6–13.2)

## 2018-01-25 PROCEDURE — 83036 HEMOGLOBIN GLYCOSYLATED A1C: CPT | Performed by: INTERNAL MEDICINE

## 2018-01-25 PROCEDURE — 85027 COMPLETE CBC AUTOMATED: CPT | Performed by: INTERNAL MEDICINE

## 2018-01-25 PROCEDURE — 80061 LIPID PANEL: CPT | Performed by: INTERNAL MEDICINE

## 2018-01-25 PROCEDURE — 80053 COMPREHEN METABOLIC PANEL: CPT | Performed by: INTERNAL MEDICINE

## 2018-01-25 PROCEDURE — 36415 COLL VENOUS BLD VENIPUNCTURE: CPT | Performed by: INTERNAL MEDICINE

## 2018-01-25 RX ORDER — AMLODIPINE BESYLATE 10 MG/1
TABLET ORAL
Qty: 60 TAB | Refills: 5 | Status: SHIPPED | OUTPATIENT
Start: 2018-01-25 | End: 2018-06-13 | Stop reason: SDUPTHER

## 2018-01-25 RX ORDER — ATENOLOL 50 MG/1
50 TABLET ORAL 2 TIMES DAILY
Qty: 180 TAB | Refills: 3 | Status: SHIPPED | OUTPATIENT
Start: 2018-01-25 | End: 2019-01-31 | Stop reason: SDUPTHER

## 2018-01-25 NOTE — PROGRESS NOTES
This is a Subsequent Medicare Annual Wellness Exam (AWV) (Performed 12 months after IPPE or effective date of Medicare Part B enrollment)    I have reviewed the patient's medical history in detail and updated the computerized patient record. History   79 yo female here for [address] Lives alone. Independent in IADLs. Notes some decline in hearing. Not interested in audiology exam as she would not want hearing aids. Past Medical History:   Diagnosis Date    Diabetes ([address]     Hypertension     Other and unspecified hyperlipidemia 2/16/2012      Past Surgical History:   Procedure Laterality Date    HX APPENDECTOMY      HX HEENT      HX RETINAL DETACHMENT REPAIR  8/27/15    Right eye     Current Outpatient Prescriptions   Medication Sig Dispense Refill    losartan-hydroCHLOROthiazide (HYZAAR) 100-25 mg per tablet Take 1 Tab by mouth daily. 90 Tab 5    insulin NPH (HUMULIN N) 100 unit/mL injection INJECT 25 UNITS BY SUBCUTANEOUSLY IN THE MORNING AND 15 UNITS SUBCUTANEOUSLY IN THE EVENING 20 mL 5    atenolol (TENORMIN) 50 mg tablet TAKE ONE TABLET BY MOUTH TWICE DAILY  180 Tab 0    simvastatin (ZOCOR) 20 mg tablet TAKE 1 TABLET BY MOUTH EVERY OTHER DAY 45 Tab 3    betamethasone dipropionate (DIPROSONE) 0.05 % topical cream Apply  to affected area two (2) times a day.  45 g 1    amLODIPine (NORVASC) 10 mg tablet TAKE 1 TABLET BY MOUTH DAILY 60 Tab 5    insulin lispro (HUMALOG) 100 unit/mL injection 5 units before the  breakfast 1 Vial 5     Allergies   Allergen Reactions    Aspirin Nausea Only    Bactrim [Sulfamethoprim] Nausea Only     And dizziness and loss of appetite    Clindamycin Other (comments)     Tongue pain and metallic taste    Pcn [Penicillins] Nausea and Vomiting and Hives    Sulfamethoxazole-Trimethoprim Itching    Tetanus Toxoid,Fluid Hives    Tetracycline Nausea Only    Tetracyclines Other (comments)     Upset stomach    Tramadol Other (comments)     Nausea and vomiting     History reviewed. No pertinent family history. Social History   Substance Use Topics    Smoking status: Current Every Day Smoker     Packs/day: 0.50     Years: 40.00    Smokeless tobacco: Never Used    Alcohol use No     Patient Active Problem List   Diagnosis Code    Diabetes mellitus ([facility] E11.9    Hypertension I10    Hyperlipidemia E78.5    DJD (degenerative joint disease), multiple sites M15.9    CKD (chronic kidney disease) stage 2, GFR 60-89 ml/min N18.2    Anemia D64.9    CKD (chronic kidney disease) stage 3, GFR 30-59 ml/min N18.3    Mastitis N61.0    Essential hypertension I10    Type 2 diabetes mellitus without complication (HCC) Q78.3    Type 2 diabetes mellitus with nephropathy (HCC) E11.21       Depression Risk Factor Screening:     PHQ over the last two weeks 10/26/2017   Little interest or pleasure in doing things Not at all   Feeling down, depressed or hopeless Several days   Total Score PHQ 2 1     Alcohol Risk Factor Screening: You do not drink alcohol or very rarely. Smokes 5 cigarettes/day  Functional Ability and Level of Safety:   Hearing Loss  The patient needs further evaluation. Activities of Daily Living  The home contains: handrails and grab bars  Patient does total self care    Fall Risk  Fall Risk Assessment, last 12 mths 10/26/2017   Able to walk? Yes   Fall in past 12 months?  No       Abuse Screen  Patient is not abused    Cognitive Screening   Evaluation of Cognitive Function:  Has your family/caregiver stated any concerns about your memory: no  Normal, Mini Cog test    Patient Care Team   Patient Care Team:  Jelly Jeronimo MD as PCP - General (Internal Medicine)  Tanja Burk MD as Physician (Ophthalmology)  Dorys Pineda RN as Nurse Viky Ghosh MD (Sleep Medicine)    Assessment/Plan   Education and counseling provided:  Are appropriate based on today's review and evaluation  End-of-Life planning (with patient's consent)    Diagnoses and all orders for this visit:    1.  Medicare annual wellness visit, subsequent      Health Maintenance Due   Topic Date Due    MEDICARE YEARLY EXAM  06/19/2015    FOOT EXAM Q1  11/18/2017    EYE EXAM RETINAL OR DILATED Q1  12/15/2017    HEMOGLOBIN A1C Q6M  01/26/2018

## 2018-01-25 NOTE — ACP (ADVANCE CARE PLANNING)
Advance Care Planning (ACP) Provider Conversation Snapshot    Date of ACP Conversation: 01/25/18  Persons included in Conversation:  patient  Length of ACP Conversation in minutes:  <16 minutes (Non-Billable)    Authorized Decision Maker (if patient is incapable of making informed decisions): This person is:   Healthcare Agent/Medical Power of  under Advance Directive          For Patients with Decision Making Capacity:   Values/Goals: Exploration of values, goals, and preferences if recovery is not expected, even with continued medical treatment in the event of:  Imminent death  Severe, permanent brain injury    Conversation Outcomes / Follow-Up Plan:   Recommended completion of Advance Directive form after review of ACP materials and conversation with prospective healthcare agent   Recommended communicating the plan and making copies for the healthcare agent, personal physician, and others as appropriate (e.g., health system)  She plans to discuss ACP with her sons today.

## 2018-01-25 NOTE — PROGRESS NOTES
ROOM # [id number]  [facility] presents today for   Chief Complaint   Patient presents with    Diabetes    Hypertension    Annual Wellness Visit       Dayana Duong preferred language for health care discussion is english/other. Is someone accompanying this pt? no    Is the patient using any DME equipment during OV? no    Depression Screening:  PHQ over the last two weeks 10/26/2017 11/18/2016 8/17/2016 1/29/2016 1/29/2015 3/18/2014   Little interest or pleasure in doing things Not at all Not at all Not at all Not at all Not at all Not at all   Feeling down, depressed or hopeless Several days Not at all Not at all Not at all Not at all Not at all   Total Score PHQ 2 1 0 0 0 0 0       Learning Assessment:  Learning Assessment 5/6/2015 8/28/2013   PRIMARY LEARNER Patient Patient   HIGHEST LEVEL OF EDUCATION - PRIMARY LEARNER  GRADUATED HIGH SCHOOL OR GED -   PRIMARY LANGUAGE ENGLISH ENGLISH   LEARNER PREFERENCE PRIMARY LISTENING READING     PICTURES -   ANSWERED BY patient patient   RELATIONSHIP SELF SELF       Abuse Screening:  Abuse Screening Questionnaire 5/6/2015   Do you ever feel afraid of your partner? N   Are you in a relationship with someone who physically or mentally threatens you? N   Is it safe for you to go home? Y       Fall Risk  Fall Risk Assessment, last 12 mths 10/26/2017 11/18/2016 8/17/2016 1/29/2016 1/29/2015 3/18/2014   Able to walk? Yes Yes Yes Yes Yes Yes   Fall in past 12 months? No No No No No No       Health Maintenance reviewed and discussed per provider. Yes    Dayana Duong is due for [address] foot exam, eye exam.  Please order/place referral if appropriate. Advance Directive:  1. Do you have an advance directive in place? Patient Reply: no    2. If not, would you like material regarding how to put one in place? Patient Reply: no    Coordination of Care:  1. Have you been to the ER, urgent care clinic since your last visit? Hospitalized since your last visit? no    2. Have you seen or consulted any other health care providers outside of the [address] [place] [address] since your last visit? Include any pap smears or colon screening.  no

## 2018-01-25 NOTE — ASSESSMENT & PLAN NOTE
Stable, based on history, physical exam and review of pertinent labs, studies and medications; meds reconciled; continue current treatment plan, Repeat labs today. She will consider referral to nephrology. Kathia Mireles [place] Antihyperglycemic Medications             insulin NPH (HUMULIN N) 100 unit/mL injection  (Taking) INJECT 25 UNITS BY SUBCUTANEOUSLY IN THE MORNING AND 15 UNITS SUBCUTANEOUSLY IN THE EVENING    insulin lispro (HUMALOG) 100 unit/mL injection  (Taking) 5 units before the  breakfast        Other Key Diabetic Medications             losartan-hydroCHLOROthiazide (HYZAAR) 100-25 mg per tablet  (Taking) Take 1 Tab by mouth daily.     simvastatin (ZOCOR) 20 mg tablet  (Taking) TAKE 1 TABLET BY MOUTH EVERY OTHER DAY        Lab Results   Component Value Date/Time    Hemoglobin A1c 7.8 07/26/2017 12:33 PM    Hemoglobin A1c (POC) 7.7 11/18/2016 03:07 PM    Glucose 125 07/26/2017 12:33 PM    Creatinine 1.37 07/26/2017 12:33 PM    Creatinine, POC 1.4 08/24/2016 07:31 AM    Microalbumin/Creat ratio (mg/g creat) 646 07/26/2017 12:33 PM    Microalbumin,urine random 31.10 07/26/2017 12:33 PM    Cholesterol, total 113 07/26/2017 12:33 PM    HDL Cholesterol 42 07/26/2017 12:33 PM    LDL, calculated 55.4 07/26/2017 12:33 PM    Triglyceride 78 07/26/2017 12:33 PM     Diabetic Foot and Eye Exam HM Status   Topic Date Due    Diabetic Foot Care  11/18/2017    Eye Exam  12/15/2017

## 2018-01-25 NOTE — PROGRESS NOTES
HISTORY OF PRESENT ILLNESS  Keiko Santiago is a 80 y.o. female. HPI Comments: 79 yo female here for [address], f/u of DM, HTN. BP still remains a little elevated. Notes she does use salt in her diet. No CP, SOB. DM: A1c a little elevated last visit but remains less than 8. She does have decreased GFR though has been stable. Review of Systems   Constitutional: Negative for chills, fever and weight loss. HENT: Positive for hearing loss. Negative for congestion and ear pain. Eyes: Negative for blurred vision and pain. Respiratory: Negative for cough and shortness of breath. Cardiovascular: Negative for chest pain, palpitations and leg swelling. Gastrointestinal: Negative for nausea and vomiting. Genitourinary: Negative for frequency and urgency. Musculoskeletal: Negative for joint pain and myalgias. Skin: Negative for itching and rash. Neurological: Negative for dizziness, tingling and headaches. Psychiatric/Behavioral: Negative for depression. The patient is not nervous/anxious. Past Medical History:   Diagnosis Date    Diabetes ([facility] [address]     Hypertension     Other and unspecified hyperlipidemia 2/16/2012     Current Outpatient Prescriptions on File Prior to Visit   Medication Sig Dispense Refill    losartan-hydroCHLOROthiazide (HYZAAR) 100-25 mg per tablet Take 1 Tab by mouth daily. 90 Tab 5    insulin NPH (HUMULIN N) 100 unit/mL injection INJECT 25 UNITS BY SUBCUTANEOUSLY IN THE MORNING AND 15 UNITS SUBCUTANEOUSLY IN THE EVENING 20 mL 5    atenolol (TENORMIN) 50 mg tablet TAKE ONE TABLET BY MOUTH TWICE DAILY  180 Tab 0    simvastatin (ZOCOR) 20 mg tablet TAKE 1 TABLET BY MOUTH EVERY OTHER DAY 45 Tab 3    betamethasone dipropionate (DIPROSONE) 0.05 % topical cream Apply  to affected area two (2) times a day.  45 g 1    amLODIPine (NORVASC) 10 mg tablet TAKE 1 TABLET BY MOUTH DAILY 60 Tab 5    insulin lispro (HUMALOG) 100 unit/mL injection 5 units before the  breakfast 1 Vial 5 No current facility-administered medications on file prior to visit. Social History   Substance Use Topics    Smoking status: Current Every Day Smoker     Packs/day: 0.50     Years: 40.00    Smokeless tobacco: Never Used    Alcohol use No     Physical Exam   Constitutional: She appears well-developed and well-nourished. No distress. /63  Pulse (!) 52  Temp 96.8 °F (36 °C) (Oral)   Resp 15  Ht 5' 5\" (1.651 m)  Wt 159 lb (72.1 kg)  SpO2 97%  BMI 26.46 kg/m2     HENT:   + cerumen debris B   Eyes: EOM are normal. Right eye exhibits no discharge. Left eye exhibits no discharge. No scleral icterus. Neck: Neck supple. Cardiovascular: Normal rate, regular rhythm and normal heart sounds. Exam reveals no gallop and no friction rub. No murmur heard. Pulmonary/Chest: Effort normal and breath sounds normal. No respiratory distress. She has no wheezes. She has no rales. Musculoskeletal: She exhibits no edema or tenderness. Lymphadenopathy:     She has no cervical adenopathy. Neurological: She is alert. She exhibits normal muscle tone. Skin: Skin is warm and dry. Psychiatric: She has a normal mood and affect.      Lab Results   Component Value Date/Time    Hemoglobin A1c 7.8 07/26/2017 12:33 PM    Hemoglobin A1c (POC) 7.7 11/18/2016 03:07 PM     Lab Results   Component Value Date/Time    Microalbumin/Creat ratio (mg/g creat) 646 07/26/2017 12:33 PM    Microalbumin,urine random 31.10 07/26/2017 12:33 PM     Lab Results   Component Value Date/Time    Sodium 139 07/26/2017 12:33 PM    Potassium 4.1 07/26/2017 12:33 PM    Chloride 104 07/26/2017 12:33 PM    CO2 26 07/26/2017 12:33 PM    Anion gap 9 07/26/2017 12:33 PM    Glucose 125 07/26/2017 12:33 PM    BUN 30 07/26/2017 12:33 PM    Creatinine 1.37 07/26/2017 12:33 PM    BUN/Creatinine ratio 22 07/26/2017 12:33 PM    GFR est AA 45 07/26/2017 12:33 PM    GFR est non-AA 37 07/26/2017 12:33 PM    Calcium 9.2 07/26/2017 12:33 PM    Bilirubin, total 0.6 07/26/2017 12:33 PM    AST (SGOT) 14 07/26/2017 12:33 PM    Alk. phosphatase 188 07/26/2017 12:33 PM    Protein, total 7.2 07/26/2017 12:33 PM    Albumin 3.4 07/26/2017 12:33 PM    Globulin 3.8 07/26/2017 12:33 PM    A-G Ratio 0.9 07/26/2017 12:33 PM    ALT (SGPT) 25 07/26/2017 12:33 PM     Lab Results   Component Value Date/Time    Cholesterol, total 113 07/26/2017 12:33 PM    HDL Cholesterol 42 07/26/2017 12:33 PM    LDL, calculated 55.4 07/26/2017 12:33 PM    VLDL, calculated 15.6 07/26/2017 12:33 PM    Triglyceride 78 07/26/2017 12:33 PM    CHOL/HDL Ratio 2.7 07/26/2017 12:33 PM     ASSESSMENT and PLAN    ICD-10-CM ICD-9-CM    1. Medicare annual wellness visit, subsequent Z00.00 V70.0    2. Type 2 diabetes mellitus with nephropathy (HCC) E11.21 250.40 LIPID PANEL     583.81 HEMOGLOBIN A1C WITH EAG   3. Essential hypertension I10 401.9 CBC W/O DIFF      METABOLIC PANEL, COMPREHENSIVE   4. Anemia, unspecified type D64.9 285.9    5. Cerumen debris on tympanic membrane of both ears H61.23 380.4 REMOVAL IMPACTED CERUMEN IRRIGATION/LVG UNILAT   6. Pure hypercholesterolemia E78.00 272.0      Discussed decreased renal function. Will repeat labs today. Consider referral to nephrology if indicated. Continue to work to control DM, HTN. Already on multiple BP medication. Discussed working on low sodium diet and provided info on AVS.   Cerumen may be impacting her hearing. Ears irrigated today for cerumen removal. Discussed prn Debrox at home.

## 2018-01-25 NOTE — MR AVS SNAPSHOT
[facility] 
 
 
 [address] [facility] [address] [address] [phone] Patient: Tao Davey MRN: [id number] [id number] Visit Information Date & Time Provider Department Dept. Phone Encounter #  
 1/25/2018 12:45 PM [address] [phone] [id number] Follow-up Instructions Return in about 3 months (around 4/25/2018) for hypertension. Upcoming Health Maintenance Date Due  
 FOOT EXAM Q1 11/18/2017 EYE EXAM RETINAL OR DILATED Q1 12/15/2017 HEMOGLOBIN A1C Q6M 7/25/2018 GLAUCOMA SCREENING Q2Y 12/15/2018 MICROALBUMIN Q1 1/25/2019 LIPID PANEL Q1 1/25/2019 MEDICARE YEARLY EXAM 1/26/2019 DTaP/Tdap/Td series (2 - Td) 7/26/2027 Allergies as of 1/25/2018  Review Complete On: 1/25/2018 By: Gabo Renteria MD  
  
 Severity Noted Reaction Type Reactions Aspirin  10/29/2015    Nausea Only Bactrim [Sulfamethoprim]  03/18/2014    Nausea Only And dizziness and loss of appetite Clindamycin  01/25/2018    Other (comments) Tongue pain and metallic taste Pcn [Penicillins]  02/16/2012    Nausea and Vomiting, Hives Sulfamethoxazole-trimethoprim  10/29/2015    Itching Tetanus Toxoid,fluid  10/29/2015    Hives Tetracycline  10/29/2015    Nausea Only Tetracyclines  03/18/2014    Other (comments) Upset stomach Tramadol  08/10/2015    Other (comments) Nausea and vomiting Current Immunizations  Reviewed on 11/18/2016 Name Date Influenza High Dose Vaccine PF 10/26/2017, 11/18/2016  3:08 PM, 10/29/2015 Influenza Vaccine 9/18/2014 12:32 PM  
 Influenza Vaccine Split 9/19/2012 Pneumococcal Conjugate (PCV-13) 5/4/2016 Pneumococcal Polysaccharide (PPSV-23) 12/17/2013  2:19 PM  
  
 Not reviewed this visit You Were Diagnosed With   
  
 Codes Comments Medicare annual wellness visit, subsequent    -  Primary ICD-10-CM: Z00.00 ICD-9-CM: V70.0 Type 2 diabetes mellitus with nephropathy (HCC)     ICD-10-CM: E11.21 
ICD-9-CM: 250.40, 583.81 Essential hypertension     ICD-10-CM: I10 
ICD-9-CM: 401.9 Anemia, unspecified type     ICD-10-CM: D64.9 ICD-9-CM: 285.9 Cerumen debris on tympanic membrane of both ears     ICD-10-CM: H61.23 
ICD-9-CM: 380.4 Pure hypercholesterolemia     ICD-10-CM: E78.00 ICD-9-CM: 272.0 Vitals BP Pulse Temp Resp Height(growth percentile) Weight(growth percentile)  
 168/63 (!) 52 96.8 °F (36 °C) (Oral) 15 5' 5\" (1.651 m) 159 lb (72.1 kg) SpO2 BMI OB Status Smoking Status 97% 26.46 kg/m2 Postmenopausal Current Every Day Smoker BMI and BSA Data Body Mass Index Body Surface Area  
 26.46 kg/m 2 1.82 m 2 Preferred Pharmacy Pharmacy Name [id number] [place] [address] [address] [facility] [phone] Your Updated Medication List  
  
   
This list is accurate as of: 1/25/18  1:32 PM.  Always use your most recent med list. amLODIPine 10 mg tablet Commonly known as:  Deysi Linda TAKE 1 TABLET BY MOUTH DAILY  
  
 atenolol 50 mg tablet Commonly known as:  TENORMIN Take 1 Tab by mouth two (2) times a day. betamethasone dipropionate 0.05 % topical cream  
Commonly known as:  Jt Pedlar Apply  to affected area two (2) times a day. insulin lispro 100 unit/mL injection Commonly known as:  HUMALOG  
5 units before the  breakfast  
  
 insulin  unit/mL injection Commonly known as:  HumuLIN N  
INJECT 25 UNITS BY SUBCUTANEOUSLY IN THE MORNING AND 15 UNITS SUBCUTANEOUSLY IN THE EVENING  
  
 losartan-hydroCHLOROthiazide 100-25 mg per tablet Commonly known as:  HYZAAR Take 1 Tab by mouth daily. simvastatin 20 mg tablet Commonly known as:  ZOCOR  
TAKE 1 TABLET BY MOUTH EVERY OTHER DAY Prescriptions Sent to Pharmacy  Refills  
 atenolol (TENORMIN) 50 mg tablet 3  
 Sig: Take 1 Tab by mouth two (2) times a day. Class: Normal  
 Pharmacy: [facility] [facility] [address] Ph #: [phone] Route: Oral  
 amLODIPine (NORVASC) 10 mg tablet 5 Sig: TAKE 1 TABLET BY MOUTH DAILY Class: Normal  
 Pharmacy: [facility], [address] Ph #: [phone] We Performed the Following REMOVAL IMPACTED CERUMEN IRRIGATION/LVG UNILAT [id number] CPT(R)] Follow-up Instructions Return in about 3 months (around 4/25/2018) for hypertension. To-Do List   
 01/25/2018 Lab:  CBC W/O DIFF   
  
 01/25/2018 Lab:  HEMOGLOBIN A1C WITH EAG   
  
 01/25/2018 Lab:  LIPID PANEL   
  
 01/25/2018 Lab:  METABOLIC PANEL, COMPREHENSIVE Patient Instructions Medicare Wellness Visit, Female The best way to live healthy is to have a healthy lifestyle by eating a well-balanced diet, exercising regularly, limiting alcohol and stopping smoking. Regular physical exams and screening tests are another way to keep healthy. Preventive exams provided by your health care provider can find health problems before they become diseases or illnesses. Preventive services including immunizations, screening tests, monitoring and exams can help you take care of your own health. All people over age 72 should have a pneumovax  and and a prevnar shot to prevent pneumonia. These are once in a lifetime unless you and your provider decide differently. All people over 65 should have a yearly flu shot and a tetanus vaccine every 10 years. A bone mass density to screen for osteoporosis or thinning of the bones should be done every 2 years after 65. Screening for diabetes mellitus with a blood sugar test should be done every year.  
 
Glaucoma is a disease of the eye due to increased ocular pressure that can lead to blindness and it should be done every year by an eye professional. 
 
 Cardiovascular screening tests that check for elevated lipids (fatty part of blood) which can lead to heart disease and strokes should be done every 5 years. Colorectal screening that evaluates for blood or polyps in your colon should be done yearly as a stool test or every five years as a flexible sigmoidoscope or every 10 years as a colonoscopy up to age 76. Breast cancer screening with a mammogram is recommended biennially  for women age 54-69. Screening for cervical cancer with a pap smear and pelvic exam is recommended for women after age 72 years every 2 years up to age 79 or when the provider and patient decide to stop. If there is a history of cervical abnormalities or other increased risk for cancer then the test is recommended yearly. Hepatitis C screening is also recommended for anyone born between [birth year] through [address]. A shingles vaccine is also recommended once in a lifetime after age 61. Your Medicare Wellness Exam is recommended annually. Here is a list of your current Health Maintenance items with a due date: 
Health Maintenance Due Topic Date Due  
 Annual Well Visit  06/19/2015 Joanne Pike Diabetic Foot Care  11/18/2017  Eye Exam  12/15/2017  Hemoglobin A1C    01/26/2018 Diabetic Nephropathy: Care Instructions Your Care Instructions Finding out that your kidneys have been damaged can be very distressing. It may have taken you by surprise, since damage to kidneys usually does not cause symptoms early on. It is normal to feel upset and afraid. Having diabetic nephropathy means that for some time you have had high blood sugar, which damages the kidneys. Healthy kidneys keep protein in your blood, where it belongs. Damaged kidneys do not work the way they should. Your kidneys are letting protein pass into your urine. Sometimes diabetic kidney disease can lead to kidney failure.  
Your doctor will tell you how you might be able to slow damage to your kidneys. In many cases, prompt and regular treatment can prevent kidney failure. You will need to take medicine and may need to make a number of changes in your normal routines. If you can keep your blood sugar and blood pressure under control and take certain medicines, you may reduce your chance of kidney failure. Follow-up care is a key part of your treatment and safety. Be sure to make and go to all appointments, and call your doctor if you are having problems. It's also a good idea to know your test results and keep a list of the medicines you take. How can you care for yourself at home? · Take your medicines exactly as prescribed. It is very important that you take your insulin or other diabetes medicine as your doctor tells you. Call your doctor if you think you are having a problem with your medicine. · Try to keep blood sugar in your target range. ¨ Eat a variety of foods, with carbohydrate spread out in your meals. Your doctor may restrict your protein. A dietitian can help you plan meals. ¨ If your doctor recommends it, get more exercise. Walking is a good choice. Bit by bit, increase the amount you walk every day. Try for at least 30 minutes on most days of the week. ¨ Check your blood sugar as often as your doctor recommends. · Take and record your blood pressure at home if your doctor tells you to. Learn the importance of the two measures of blood pressure (such as 130 over 80, or 130/80). To take your blood pressure at home: ¨ Ask your doctor to check your blood pressure monitor. He or she can make sure that it is accurate and that the cuff fits you. Also ask your doctor to watch you to make sure that you are using it right. ¨ Do not eat, use tobacco products, or use medicine known to raise blood pressure (such as some nasal decongestant sprays) before taking your blood pressure.  
¨ Avoid taking your blood pressure if you have just exercised or are nervous or upset. Rest at least 15 minutes before taking a reading. · Eat a low-salt diet to help keep your blood pressure in your target range. · Do not smoke. Smoking raises your risk of many health problems, including diabetic nephropathy. If you need help quitting, talk to your doctor about stop-smoking programs and medicines. These can increase your chances of quitting for good. · Do not take ibuprofen, naproxen, or similar medicines, unless your doctor tells you to. These medicines may make kidney problems worse. When should you call for help? Call 911 anytime you think you may need emergency care. For example, call if: 
? · You passed out (lost consciousness). ?Call your doctor now or seek immediate medical care if: 
? · You have new or worse nausea and vomiting. ? · You have much less urine than normal, or you have no urine. ? · You are feeling confused or cannot think clearly. ? · You have new or more blood in your urine. ? · You have new swelling. ? · You are dizzy or lightheaded, or feel like you may faint. ? Watch closely for changes in your health, and be sure to contact your doctor if: 
? · You do not get better as expected. Where can you learn more? Go to http://mukund-kun.info/. Enter P361 in the search box to learn more about \"Diabetic Nephropathy: Care Instructions. \" Current as of: May 12, 2017 Content Version: 11.4 © 9297-6239 [employer]. Care instructions adapted under license by [employer] (which disclaims liability or warranty for this information). If you have questions about a medical condition or this instruction, always ask your healthcare professional. [place] [address] any warranty or liability for your use of this information. Earwax Blockage: Care Instructions Your Care Instructions Earwax is a natural substance that protects the ear canal. Normally, earwax drains from the ears and does not cause problems. Sometimes earwax builds up and hardens. Earwax blockage (also called cerumen impaction) can cause some loss of hearing and pain. When wax is tightly packed, you will need to have your doctor remove it. Follow-up care is a key part of your treatment and safety. Be sure to make and go to all appointments, and call your doctor if you are having problems. It's also a good idea to know your test results and keep a list of the medicines you take. How can you care for yourself at home? · Do not try to remove earwax with cotton swabs, fingers, or other objects. This can make the blockage worse and damage the eardrum. · If your doctor recommends that you try to remove earwax at home: ¨ Soften and loosen the earwax with warm mineral oil. You also can try hydrogen peroxide mixed with an equal amount of room temperature water. Place 2 drops of the fluid, warmed to body temperature, in the ear two times a day for up to 5 days. ¨ Once the wax is loose and soft, all that is usually needed to remove it from the ear canal is a gentle, warm shower. Direct the water into the ear, then tip your head to let the earwax drain out. Dry your ear thoroughly with a hair dryer set on low. Hold the dryer several inches from your ear. ¨ If the warm mineral oil and shower do not work, use an over-the-counter wax softener followed by gentle flushing with an ear syringe each night for a week or two. Make sure the flushing solution is body temperature. Cool or hot fluids in the ear can cause dizziness. When should you call for help? Call your doctor now or seek immediate medical care if: 
? · Pus or blood drains from your ear. ? · Your ears are ringing or feel full. ? · You have a loss of hearing. ? Watch closely for changes in your health, and be sure to contact your doctor if: 
? · You have pain or reduced hearing after 1 week of home treatment. ? · You have any new symptoms, such as nausea or balance problems. Where can you learn more? Go to http://mukund-kun.info/. Enter R172 in the search box to learn more about \"Earwax Blockage: Care Instructions. \" Current as of: March 20, 2017 Content Version: 11.4 © 2802-6657 [employer]. Care instructions adapted under license by [employer] (which disclaims liability or warranty for this information). If you have questions about a medical condition or this instruction, always ask your healthcare professional. [address] any warranty or liability for your use of this information. DASH Diet: Care Instructions Your Care Instructions The DASH diet is an eating plan that can help lower your blood pressure. DASH stands for Dietary Approaches to Stop Hypertension. Hypertension is high blood pressure. The DASH diet focuses on eating foods that are high in calcium, potassium, and magnesium. These nutrients can lower blood pressure. The foods that are highest in these nutrients are fruits, vegetables, low-fat dairy products, nuts, seeds, and legumes. But taking calcium, potassium, and magnesium supplements instead of eating foods that are high in those nutrients does not have the same effect. The DASH diet also includes whole grains, fish, and poultry. The DASH diet is one of several lifestyle changes your doctor may recommend to lower your high blood pressure. Your doctor may also want you to decrease the amount of sodium in your diet. Lowering sodium while following the DASH diet can lower blood pressure even further than just the DASH diet alone. Follow-up care is a key part of your treatment and safety. Be sure to make and go to all appointments, and call your doctor if you are having problems. It's also a good idea to know your test results and keep a list of the medicines you take. How can you care for yourself at home? Following the DASH diet · Eat 4 to 5 servings of fruit each day. A serving is 1 medium-sized piece of fruit, ½ cup chopped or canned fruit, 1/4 cup dried fruit, or 4 ounces (½ cup) of fruit juice. Choose fruit more often than fruit juice. · Eat 4 to 5 servings of vegetables each day. A serving is 1 cup of lettuce or raw leafy vegetables, ½ cup of chopped or cooked vegetables, or 4 ounces (½ cup) of vegetable juice. Choose vegetables more often than vegetable juice. · Get 2 to 3 servings of low-fat and fat-free dairy each day. A serving is 8 ounces of milk, 1 cup of yogurt, or 1 ½ ounces of cheese. · Eat 6 to 8 servings of grains each day. A serving is 1 slice of bread, 1 ounce of dry cereal, or ½ cup of cooked rice, pasta, or cooked cereal. Try to choose whole-grain products as much as possible. · Limit lean meat, poultry, and fish to 2 servings each day. A serving is 3 ounces, about the size of a deck of cards. · Eat 4 to 5 servings of nuts, seeds, and legumes (cooked dried beans, lentils, and split peas) each week. A serving is 1/3 cup of nuts, 2 tablespoons of seeds, or ½ cup of cooked beans or peas. · Limit fats and oils to 2 to 3 servings each day. A serving is 1 teaspoon of vegetable oil or 2 tablespoons of salad dressing. · Limit sweets and added sugars to 5 servings or less a week. A serving is 1 tablespoon jelly or jam, ½ cup sorbet, or 1 cup of lemonade. · Eat less than 2,300 milligrams (mg) of sodium a day. If you limit your sodium to 1,500 mg a day, you can lower your blood pressure even more. Tips for success · Start small. Do not try to make dramatic changes to your diet all at once. You might feel that you are missing out on your favorite foods and then be more likely to not follow the plan. Make small changes, and stick with them. Once those changes become habit, add a few more changes. · Try some of the following: ¨ Make it a goal to eat a fruit or vegetable at every meal and at snacks. This will make it easy to get the recommended amount of fruits and vegetables each day. ¨ Try yogurt topped with fruit and nuts for a snack or healthy dessert. ¨ Add lettuce, tomato, cucumber, and onion to sandwiches. ¨ Combine a ready-made pizza crust with low-fat mozzarella cheese and lots of vegetable toppings. Try using tomatoes, squash, spinach, broccoli, carrots, cauliflower, and onions. ¨ Have a variety of cut-up vegetables with a low-fat dip as an appetizer instead of chips and dip. ¨ Sprinkle sunflower seeds or chopped almonds over salads. Or try adding chopped walnuts or almonds to cooked vegetables. ¨ Try some vegetarian meals using beans and peas. Add garbanzo or kidney beans to salads. Make burritos and tacos with mashed brooks beans or black beans. Where can you learn more? Go to http://mukund-kun.info/. Enter X696 in the search box to learn more about \"DASH Diet: Care Instructions. \" Current as of: September 21, 2016 Content Version: 11.4 © 7034-0207 [employer]. Care instructions adapted under license by [employer]adget[employer] (which disclaims liability or warranty for this information). If you have questions about a medical condition or this instruction, always ask your healthcare professional. [place] [address] any warranty or liability for your use of this information. Introducing [facility] HE[facility]! Mika Robison introduces BRIKA patient portal. Now you can access parts of your medical record, email your doctor's office, and request medication refills online. 1. In your internet browser, go to [email or web address] 2. Click on the First Time User? Click Here link in the Sign In box. You will see the New Member Sign Up page. 3. Enter your BRIKA Access Code exactly as it appears below. You will not need to use this code after youve completed the sign-up process.  If you do not sign up before the expiration date, you must request a new code. · RentWiki Access Code: [email or web address] Expires: 4/25/2018  1:32 PM 
 
4. Enter the last four digits of your Social Security Number (xxxx) and Date of Birth (mm/dd/yyyy) as indicated and click Submit. You will be taken to the next sign-up page. 5. Create a RentWiki ID. This will be your RentWiki login ID and cannot be changed, so think of one that is secure and easy to remember. 6. Create a RentWiki password. You can change your password at any time. 7. Enter your Password Reset Question and Answer. This can be used at a later time if you forget your password. 8. Enter your e-mail address. You will receive e-mail notification when new information is available in [address] 9. Click Sign Up. You can now view and download portions of your medical record. 10. Click the Download Summary menu link to download a portable copy of your medical information. If you have questions, please visit the Frequently Asked Questions section of the RentWiki website. Remember, RentWiki is NOT to be used for urgent needs. For medical emergencies, dial 911. Now available from your iPhone and Android! Please provide this summary of care documentation to your next provider. Your primary care clinician is listed as [facility] [address] If you have any questions after today's visit, please call [phone].

## 2018-01-25 NOTE — ASSESSMENT & PLAN NOTE
Stable, based on history, physical exam and review of pertinent labs, studies and medications; meds reconciled; continue current treatment plan, Repeat labs today.   Key Antihyperglycemic Medications             insulin NPH (HUMULIN N) 100 unit/mL injection  (Taking) INJECT 25 UNITS BY SUBCUTANEOUSLY IN THE MORNING AND 15 UNITS SUBCUTANEOUSLY IN THE EVENING    insulin lispro (HUMALOG) 100 unit/mL injection  (Taking) 5 units before the  breakfast

## 2018-01-25 NOTE — PATIENT INSTRUCTIONS
Medicare Wellness Visit, Female    The best way to live healthy is to have a healthy lifestyle by eating a well-balanced diet, exercising regularly, limiting alcohol and stopping smoking. Regular physical exams and screening tests are another way to keep healthy. Preventive exams provided by your health care provider can find health problems before they become diseases or illnesses. Preventive services including immunizations, screening tests, monitoring and exams can help you take care of your own health. All people over age 72 should have a pneumovax  and and a prevnar shot to prevent pneumonia. These are once in a lifetime unless you and your provider decide differently. All people over 65 should have a yearly flu shot and a tetanus vaccine every 10 years. A bone mass density to screen for osteoporosis or thinning of the bones should be done every 2 years after 65. Screening for diabetes mellitus with a blood sugar test should be done every year. Glaucoma is a disease of the eye due to increased ocular pressure that can lead to blindness and it should be done every year by an eye professional.    Cardiovascular screening tests that check for elevated lipids (fatty part of blood) which can lead to heart disease and strokes should be done every 5 years. Colorectal screening that evaluates for blood or polyps in your colon should be done yearly as a stool test or every five years as a flexible sigmoidoscope or every 10 years as a colonoscopy up to age 76. Breast cancer screening with a mammogram is recommended biennially  for women age 54-69. Screening for cervical cancer with a pap smear and pelvic exam is recommended for women after age 72 years every 2 years up to age 79 or when the provider and patient decide to stop. If there is a history of cervical abnormalities or other increased risk for cancer then the test is recommended yearly.     Hepatitis C screening is also recommended for anyone born between [birth year] through [address]. A shingles vaccine is also recommended once in a lifetime after age 61. Your Medicare Wellness Exam is recommended annually. Here is a list of your current Health Maintenance items with a due date:  Health Maintenance Due   Topic Date Due    Annual Well Visit  06/19/2015    Diabetic Foot Care  11/18/2017    Eye Exam  12/15/2017    Hemoglobin A1C    01/26/2018          Diabetic Nephropathy: Care Instructions  Your Care Instructions    Finding out that your kidneys have been damaged can be very distressing. It may have taken you by surprise, since damage to kidneys usually does not cause symptoms early on. It is normal to feel upset and afraid. Having diabetic nephropathy means that for some time you have had high blood sugar, which damages the kidneys. Healthy kidneys keep protein in your blood, where it belongs. Damaged kidneys do not work the way they should. Your kidneys are letting protein pass into your urine. Sometimes diabetic kidney disease can lead to kidney failure. Your doctor will tell you how you might be able to slow damage to your kidneys. In many cases, prompt and regular treatment can prevent kidney failure. You will need to take medicine and may need to make a number of changes in your normal routines. If you can keep your blood sugar and blood pressure under control and take certain medicines, you may reduce your chance of kidney failure. Follow-up care is a key part of your treatment and safety. Be sure to make and go to all appointments, and call your doctor if you are having problems. It's also a good idea to know your test results and keep a list of the medicines you take. How can you care for yourself at home? · Take your medicines exactly as prescribed. It is very important that you take your insulin or other diabetes medicine as your doctor tells you. Call your doctor if you think you are having a problem with your medicine.   · Try to keep blood sugar in your target range. ¨ Eat a variety of foods, with carbohydrate spread out in your meals. Your doctor may restrict your protein. A dietitian can help you plan meals. ¨ If your doctor recommends it, get more exercise. Walking is a good choice. Bit by bit, increase the amount you walk every day. Try for at least 30 minutes on most days of the week. ¨ Check your blood sugar as often as your doctor recommends. · Take and record your blood pressure at home if your doctor tells you to. Learn the importance of the two measures of blood pressure (such as 130 over 80, or 130/80). To take your blood pressure at home:  ¨ Ask your doctor to check your blood pressure monitor. He or she can make sure that it is accurate and that the cuff fits you. Also ask your doctor to watch you to make sure that you are using it right. ¨ Do not eat, use tobacco products, or use medicine known to raise blood pressure (such as some nasal decongestant sprays) before taking your blood pressure. ¨ Avoid taking your blood pressure if you have just exercised or are nervous or upset. Rest at least 15 minutes before taking a reading. · Eat a low-salt diet to help keep your blood pressure in your target range. · Do not smoke. Smoking raises your risk of many health problems, including diabetic nephropathy. If you need help quitting, talk to your doctor about stop-smoking programs and medicines. These can increase your chances of quitting for good. · Do not take ibuprofen, naproxen, or similar medicines, unless your doctor tells you to. These medicines may make kidney problems worse. When should you call for help? Call 911 anytime you think you may need emergency care. For example, call if:  ? · You passed out (lost consciousness). ?Call your doctor now or seek immediate medical care if:  ? · You have new or worse nausea and vomiting. ? · You have much less urine than normal, or you have no urine.    ? · You are feeling confused or cannot think clearly. ? · You have new or more blood in your urine. ? · You have new swelling. ? · You are dizzy or lightheaded, or feel like you may faint. ? Watch closely for changes in your health, and be sure to contact your doctor if:  ? · You do not get better as expected. Where can you learn more? Go to http://mukund-kun.info/. Enter P361 in the search box to learn more about \"Diabetic Nephropathy: Care Instructions. \"  Current as of: May 12, 2017  Content Version: 11.4  © 2443-3343 [employer]. Care instructions adapted under license by [employer] (which disclaims liability or warranty for this information). If you have questions about a medical condition or this instruction, always ask your healthcare professional. [address] any warranty or liability for your use of this information. Earwax Blockage: Care Instructions  Your Care Instructions    Earwax is a natural substance that protects the ear canal. Normally, earwax drains from the ears and does not cause problems. Sometimes earwax builds up and hardens. Earwax blockage (also called cerumen impaction) can cause some loss of hearing and pain. When wax is tightly packed, you will need to have your doctor remove it. Follow-up care is a key part of your treatment and safety. Be sure to make and go to all appointments, and call your doctor if you are having problems. It's also a good idea to know your test results and keep a list of the medicines you take. How can you care for yourself at home? · Do not try to remove earwax with cotton swabs, fingers, or other objects. This can make the blockage worse and damage the eardrum. · If your doctor recommends that you try to remove earwax at home:  ¨ Soften and loosen the earwax with warm mineral oil. You also can try hydrogen peroxide mixed with an equal amount of room temperature water.  Place 2 drops of the fluid, warmed to body temperature, in the ear two times a day for up to 5 days. ¨ Once the wax is loose and soft, all that is usually needed to remove it from the ear canal is a gentle, warm shower. Direct the water into the ear, then tip your head to let the earwax drain out. Dry your ear thoroughly with a hair dryer set on low. Hold the dryer several inches from your ear. ¨ If the warm mineral oil and shower do not work, use an over-the-counter wax softener followed by gentle flushing with an ear syringe each night for a week or two. Make sure the flushing solution is body temperature. Cool or hot fluids in the ear can cause dizziness. When should you call for help? Call your doctor now or seek immediate medical care if:  ? · Pus or blood drains from your ear. ? · Your ears are ringing or feel full. ? · You have a loss of hearing. ? Watch closely for changes in your health, and be sure to contact your doctor if:  ? · You have pain or reduced hearing after 1 week of home treatment. ? · You have any new symptoms, such as nausea or balance problems. Where can you learn more? Go to http://mukund-kun.info/. Enter C470 in the search box to learn more about \"Earwax Blockage: Care Instructions. \"  Current as of: March 20, 2017  Content Version: 11.4  © 4919-1840 [employer]. Care instructions adapted under license by [employer] (which disclaims liability or warranty for this information). If you have questions about a medical condition or this instruction, always ask your healthcare professional. [place] [address] any warranty or liability for your use of this information. DASH Diet: Care Instructions  Your Care Instructions    The DASH diet is an eating plan that can help lower your blood pressure. DASH stands for Dietary Approaches to Stop Hypertension. Hypertension is high blood pressure.   The DASH diet focuses on eating foods that are high in calcium, potassium, and magnesium. These nutrients can lower blood pressure. The foods that are highest in these nutrients are fruits, vegetables, low-fat dairy products, nuts, seeds, and legumes. But taking calcium, potassium, and magnesium supplements instead of eating foods that are high in those nutrients does not have the same effect. The DASH diet also includes whole grains, fish, and poultry. The DASH diet is one of several lifestyle changes your doctor may recommend to lower your high blood pressure. Your doctor may also want you to decrease the amount of sodium in your diet. Lowering sodium while following the DASH diet can lower blood pressure even further than just the DASH diet alone. Follow-up care is a key part of your treatment and safety. Be sure to make and go to all appointments, and call your doctor if you are having problems. It's also a good idea to know your test results and keep a list of the medicines you take. How can you care for yourself at home? Following the DASH diet  · Eat 4 to 5 servings of fruit each day. A serving is 1 medium-sized piece of fruit, ½ cup chopped or canned fruit, 1/4 cup dried fruit, or 4 ounces (½ cup) of fruit juice. Choose fruit more often than fruit juice. · Eat 4 to 5 servings of vegetables each day. A serving is 1 cup of lettuce or raw leafy vegetables, ½ cup of chopped or cooked vegetables, or 4 ounces (½ cup) of vegetable juice. Choose vegetables more often than vegetable juice. · Get 2 to 3 servings of low-fat and fat-free dairy each day. A serving is 8 ounces of milk, 1 cup of yogurt, or 1 ½ ounces of cheese. · Eat 6 to 8 servings of grains each day. A serving is 1 slice of bread, 1 ounce of dry cereal, or ½ cup of cooked rice, pasta, or cooked cereal. Try to choose whole-grain products as much as possible. · Limit lean meat, poultry, and fish to 2 servings each day. A serving is 3 ounces, about the size of a deck of cards.   · Eat 4 to 5 servings of nuts, seeds, and legumes (cooked dried beans, lentils, and split peas) each week. A serving is 1/3 cup of nuts, 2 tablespoons of seeds, or ½ cup of cooked beans or peas. · Limit fats and oils to 2 to 3 servings each day. A serving is 1 teaspoon of vegetable oil or 2 tablespoons of salad dressing. · Limit sweets and added sugars to 5 servings or less a week. A serving is 1 tablespoon jelly or jam, ½ cup sorbet, or 1 cup of lemonade. · Eat less than 2,300 milligrams (mg) of sodium a day. If you limit your sodium to 1,500 mg a day, you can lower your blood pressure even more. Tips for success  · Start small. Do not try to make dramatic changes to your diet all at once. You might feel that you are missing out on your favorite foods and then be more likely to not follow the plan. Make small changes, and stick with them. Once those changes become habit, add a few more changes. · Try some of the following:  ¨ Make it a goal to eat a fruit or vegetable at every meal and at snacks. This will make it easy to get the recommended amount of fruits and vegetables each day. ¨ Try yogurt topped with fruit and nuts for a snack or healthy dessert. ¨ Add lettuce, tomato, cucumber, and onion to sandwiches. ¨ Combine a ready-made pizza crust with low-fat mozzarella cheese and lots of vegetable toppings. Try using tomatoes, squash, spinach, broccoli, carrots, cauliflower, and onions. ¨ Have a variety of cut-up vegetables with a low-fat dip as an appetizer instead of chips and dip. ¨ Sprinkle sunflower seeds or chopped almonds over salads. Or try adding chopped walnuts or almonds to cooked vegetables. ¨ Try some vegetarian meals using beans and peas. Add garbanzo or kidney beans to salads. Make burritos and tacos with mashed brooks beans or black beans. Where can you learn more? Go to http://mukund-kun.info/.   Enter M281 in the search box to learn more about \"DASH Diet: Care Instructions. \"  Current as of: September 21, 2016  Content Version: 11.4  © 9870-3766 [employer], [facility]. Care instructions adapted under license by [employer] (which disclaims liability or warranty for this information). If you have questions about a medical condition or this instruction, always ask your healthcare professional. [place] [address] any warranty or liability for your use of this information.

## 2018-01-29 DIAGNOSIS — R74.8 ELEVATED ALKALINE PHOSPHATASE LEVEL: Primary | ICD-10-CM

## 2018-01-29 DIAGNOSIS — R74.8 ELEVATED ALKALINE PHOSPHATASE LEVEL: ICD-10-CM

## 2018-01-29 NOTE — PROGRESS NOTES
Please let her know her labs look okay except for a continued elevation of one of her liver enzymes. I have ordered additional labs to further assess this that can be done at her convenience.

## 2018-01-30 ENCOUNTER — TELEPHONE (OUTPATIENT)
Dept: INTERNAL MEDICINE CLINIC | Age: 83
End: 2018-01-30

## 2018-01-30 NOTE — TELEPHONE ENCOUNTER
----- Message from Manuel Hurley MD sent at 1/29/2018  8:35 AM EST -----  Please let her know her labs look okay except for a continued elevation of one of her liver enzymes. I have ordered additional labs to further assess this that can be done at her convenience.

## 2018-01-30 NOTE — TELEPHONE ENCOUNTER
Contacted pt at [facility] number. Two patient Identifiers confirmed. Advised pt per Dr Reyes Dose notes. Pt verbalized understanding.

## 2018-02-01 ENCOUNTER — HOSPITAL ENCOUNTER (OUTPATIENT)
Dept: LAB | Age: 83
Discharge: HOME | End: 2018-02-01
Payer: MEDICARE

## 2018-02-01 LAB
CALCIUM SERPL-MCNC: 9.2 MG/DL (ref 8.5–10.1)
PTH-INTACT SERPL-MCNC: 141.9 PG/ML (ref 18.4–88)

## 2018-02-01 PROCEDURE — 84080 ASSAY ALKALINE PHOSPHATASES: CPT | Performed by: INTERNAL MEDICINE

## 2018-02-01 PROCEDURE — 36415 COLL VENOUS BLD VENIPUNCTURE: CPT | Performed by: INTERNAL MEDICINE

## 2018-02-01 PROCEDURE — 83970 ASSAY OF PARATHORMONE: CPT | Performed by: INTERNAL MEDICINE

## 2018-02-01 PROCEDURE — 82977 ASSAY OF GGT: CPT | Performed by: INTERNAL MEDICINE

## 2018-02-02 LAB — GGT SERPL-CCNC: 18 IU/L (ref 0–60)

## 2018-02-04 LAB
ALP BONE CFR SERPL: 47 % (ref 14–68)
ALP INTEST CFR SERPL: 2 % (ref 0–18)
ALP LIVER CFR SERPL: 51 % (ref 18–85)
ALP SERPL-CCNC: 200 IU/L (ref 39–117)

## 2018-02-15 NOTE — PROGRESS NOTES
Please let her know some of her labs are still a bit elevated. I would like her to see a nephrologist for further input given the protein in her urine in the past. Please let me know if she is willing to have this referral placed.

## 2018-02-19 ENCOUNTER — TELEPHONE (OUTPATIENT)
Dept: INTERNAL MEDICINE CLINIC | Age: 83
End: 2018-02-19

## 2018-02-19 DIAGNOSIS — N18.30 CKD (CHRONIC KIDNEY DISEASE) STAGE 3, GFR 30-59 ML/MIN (HCC): Primary | ICD-10-CM

## 2018-02-19 NOTE — TELEPHONE ENCOUNTER
Attempted to contact pt at  number, no answer. Lvm for pt to return call to office at [phone]. Will continue to try to contact pt.

## 2018-02-19 NOTE — TELEPHONE ENCOUNTER
Patient returned call to office. Patient's phone is not working, asked to be contacted at [phone]. Patient asked if Son answered for staff to give him the information.

## 2018-02-19 NOTE — PROGRESS NOTES
Attempted to contact pt at  number, no answer. Lvm for pt to return call to office at [phone]. Will continue to try to contact pt.

## 2018-02-19 NOTE — TELEPHONE ENCOUNTER
----- Message from Mukul Fuller MD sent at 2/15/2018  8:20 AM EST -----  Please let her know some of her labs are still a bit elevated. I would like her to see a nephrologist for further input given the protein in her urine in the past. Please let me know if she is willing to have this referral placed.

## 2018-02-19 NOTE — TELEPHONE ENCOUNTER
Contacted pts son at number given [facility] Grey. Two patient Identifiers confirmed. Advised pt per Dr Marisa Singh notes. Pts son verbalized understanding and stated pt would like a referral to nephro and would like it to be assigned to someone in [place] if possible.  Dr Marisa Singh please generate referral.

## 2018-02-19 NOTE — TELEPHONE ENCOUNTER
----- Message from Kingston Moon MD sent at 2/15/2018  8:20 AM EST -----  Please let her know some of her labs are still a bit elevated. I would like her to see a nephrologist for further input given the protein in her urine in the past. Please let me know if she is willing to have this referral placed.

## 2018-04-25 ENCOUNTER — OFFICE VISIT (OUTPATIENT)
Dept: INTERNAL MEDICINE CLINIC | Age: 83
End: 2018-04-25

## 2018-04-25 VITALS
RESPIRATION RATE: 18 BRPM | SYSTOLIC BLOOD PRESSURE: 158 MMHG | OXYGEN SATURATION: 99 % | HEIGHT: 65 IN | HEART RATE: 53 BPM | TEMPERATURE: 96.8 F | BODY MASS INDEX: 26.39 KG/M2 | DIASTOLIC BLOOD PRESSURE: 65 MMHG | WEIGHT: 158.4 LBS

## 2018-04-25 DIAGNOSIS — I10 ESSENTIAL HYPERTENSION: ICD-10-CM

## 2018-04-25 DIAGNOSIS — R21 RASH: ICD-10-CM

## 2018-04-25 DIAGNOSIS — E78.00 PURE HYPERCHOLESTEROLEMIA: ICD-10-CM

## 2018-04-25 DIAGNOSIS — E11.21 TYPE 2 DIABETES MELLITUS WITH NEPHROPATHY (HCC): Primary | ICD-10-CM

## 2018-04-25 RX ORDER — SIMVASTATIN 20 MG/1
TABLET, FILM COATED ORAL
Qty: 45 TAB | Refills: 3 | Status: SHIPPED | OUTPATIENT
Start: 2018-04-25 | End: 2020-03-11 | Stop reason: SDUPTHER

## 2018-04-25 RX ORDER — CLOTRIMAZOLE AND BETAMETHASONE DIPROPIONATE 10; .64 MG/G; MG/G
CREAM TOPICAL
Qty: 45 G | Refills: 1 | Status: SHIPPED | OUTPATIENT
Start: 2018-04-25 | End: 2019-03-23 | Stop reason: SDUPTHER

## 2018-04-25 RX ORDER — INSULIN LISPRO 100 [IU]/ML
INJECTION, SOLUTION INTRAVENOUS; SUBCUTANEOUS
Qty: 1 VIAL | Refills: 5 | Status: SHIPPED | OUTPATIENT
Start: 2018-04-25 | End: 2019-06-27 | Stop reason: SDUPTHER

## 2018-04-25 NOTE — MR AVS SNAPSHOT
[facility] 
 
 
 [address] [facility] [address] [address] 
[phone] Patient: Shireen Dakin MRN: [id number]:[id number] Visit Information Date & Time Provider Department Dept. Phone Encounter #  
 4/25/2018 12:45 PM  [facility], [address] [phone] [id number] Follow-up Instructions Return in about 4 months (around 8/25/2018). Upcoming Health Maintenance Date Due  
 FOOT EXAM Q1 11/18/2017 EYE EXAM RETINAL OR DILATED Q1 12/15/2017 HEMOGLOBIN A1C Q6M 7/25/2018 GLAUCOMA SCREENING Q2Y 12/15/2018 MICROALBUMIN Q1 1/25/2019 LIPID PANEL Q1 1/25/2019 MEDICARE YEARLY EXAM 1/26/2019 DTaP/Tdap/Td series (2 - Td) 7/26/2027 Allergies as of 4/25/2018  Review Complete On: 4/25/2018 By: Rebeca Ochoa Severity Noted Reaction Type Reactions Aspirin  10/29/2015    Nausea Only Bactrim [Sulfamethoprim]  03/18/2014    Nausea Only And dizziness and loss of appetite Clindamycin  01/25/2018    Other (comments) Tongue pain and metallic taste Pcn [Penicillins]  02/16/2012    Nausea and Vomiting, Hives Sulfamethoxazole-trimethoprim  10/29/2015    Itching Tetanus Toxoid,fluid  10/29/2015    Hives Tetracycline  10/29/2015    Nausea Only Tetracyclines  03/18/2014    Other (comments) Upset stomach Tramadol  08/10/2015    Other (comments) Nausea and vomiting Current Immunizations  Reviewed on 11/18/2016 Name Date Influenza High Dose Vaccine PF 10/26/2017, 11/18/2016  3:08 PM, 10/29/2015 Influenza Vaccine 9/18/2014 12:32 PM  
 Influenza Vaccine Split 9/19/2012 Pneumococcal Conjugate (PCV-13) 5/4/2016 Pneumococcal Polysaccharide (PPSV-23) 12/17/2013  2:19 PM  
  
 Not reviewed this visit You Were Diagnosed With   
  
 Codes Comments  Type 2 diabetes mellitus with nephropathy ([facility]    -  Primary ICD-10-CM: E11.21 
ICD-9-CM: 250.40, 583.81   
 Essential hypertension     ICD-10-CM: I10 
ICD-9-CM: 401.9 Pure hypercholesterolemia     ICD-10-CM: E78.00 ICD-9-CM: 272.0 Rash     ICD-10-CM: R21 
ICD-9-CM: 782.1 Vitals BP Pulse Temp Resp Height(growth percentile) Weight(growth percentile) 158/65 (BP 1 Location: Right arm, BP Patient Position: Sitting) (!) 53 96.8 °F (36 °C) (Oral) 18 5' 5\" (1.651 m) 158 lb 6.4 oz (71.8 kg) SpO2 BMI OB Status Smoking Status 99% 26.36 kg/m2 Postmenopausal Current Every Day Smoker Vitals History BMI and BSA Data Body Mass Index Body Surface Area  
 26.36 kg/m 2 1.81 m 2 Preferred Pharmacy Pharmacy Name Phone [facility] [address] [place] [address] Ruy Cobb Your Updated Medication List  
  
   
This list is accurate as of 4/25/18  1:11 PM.  Always use your most recent med list. amLODIPine 10 mg tablet Commonly known as:  Justo Shield TAKE 1 TABLET BY MOUTH DAILY  
  
 atenolol 50 mg tablet Commonly known as:  TENORMIN Take 1 Tab by mouth two (2) times a day. betamethasone dipropionate 0.05 % topical cream  
Commonly known as:  Yared Fuss Apply  to affected area two (2) times a day. clotrimazole-betamethasone topical cream  
Commonly known as:  Dorlene Dear Apply to affected area twice daily  
  
 insulin lispro 100 unit/mL injection Commonly known as:  HUMALOG  
5 units before the  breakfast  Indications: type 2 diabetes mellitus  
  
 insulin  unit/mL injection Commonly known as:  HumuLIN N NPH U-100 Insulin INJECT 25 UNITS BY SUBCUTANEOUSLY IN THE MORNING AND 15 UNITS SUBCUTANEOUSLY IN THE EVENING  
  
 losartan-hydroCHLOROthiazide 100-25 mg per tablet Commonly known as:  HYZAAR Take 1 Tab by mouth daily. simvastatin 20 mg tablet Commonly known as:  ZOCOR  
TAKE 1 TABLET BY MOUTH EVERY OTHER DAY Prescriptions Sent to Pharmacy Refills  
 insulin lispro (HUMALOG) 100 unit/mL injection 5 Si units before the  breakfast  Indications: type 2 diabetes mellitus Class: Normal  
 Pharmacy: [facility] [address] [place] [address] Ph #: [phone]  
 simvastatin (ZOCOR) 20 mg tablet 3 Sig: TAKE 1 TABLET BY MOUTH EVERY OTHER DAY Class: Normal  
 Pharmacy: [facility] [address] [place] [address] Ph #: [phone]  
 clotrimazole-betamethasone (LOTRISONE) topical cream 1 Sig: Apply to affected area twice daily Class: Normal  
 Pharmacy: [facility] [address] [place] [address] Ph #: [phone] Follow-up Instructions Return in about 4 months (around 2018). Patient Instructions Betamethasone/Clotrimazole (On the skin) Betamethasone Dipropionate (bay-ta-METH-a-sone dye-PROE-pee-oh-erlinda), Clotrimazole (ekec-RLZA-t-zole) Treats fungus infections. This is a combination of a steroid and an antifungal medicine. Brand Name(s): DermacinRSury Mendenhall There may be other brand names for this medicine. When This Medicine Should Not Be Used: This medicine is not right for everyone. Do not use it if you had an allergic reaction to clotrimazole or betamethasone. How to Use This Medicine:  
Cream, Lotion · Your doctor will tell you how much medicine to use. Do not use more than directed. · Use this medicine only on your skin. Rinse it off right away if it gets on a cut or scrape. Do not get the medicine in your eyes, nose, or mouth. · Wash your hands with soap and water before and after you use this medicine. · Apply a thin layer of the medicine to the affected area. Rub it in gently. · Shake the bottle of lotion well just before use.  
· Do not cover, wrap, or wear tight fitting clothes over your treated skin areas unless directed by your doctor. Allow the medicine to dry before you put on your clothes. · It is very important that you keep using this medicine for the full time of treatment to clear up your skin problem completely. Do not miss any doses. · This medicine is not for long-term use. Do not use the cream to treat jock itch and ringworm for more than 2 weeks, and for athlete's foot for more than 4 weeks, unless your doctor has told you to. · Read and follow the patient instructions that come with this medicine. Talk to your doctor or pharmacist if you have any questions. · Missed dose: Apply a dose as soon as you can. If it is almost time for your next dose, wait until then and apply a regular dose. Do not apply extra medicine to make up for a missed dose. · Store the medicine in a closed container at room temperature, away from heat, moisture, and direct light. Store the lotion bottle upright. Drugs and Foods to Avoid: Ask your doctor or pharmacist before using any other medicine, including over-the-counter medicines, vitamins, and herbal products. · Some medicines can affect how betamethasone and clotrimazole combination works. Tell your doctor if you are using other steroids. Warnings While Using This Medicine: · Tell your doctor if you are pregnant or breastfeeding, or if you have liver problems, diabetes, or a skin infection. · Do not use this medicine on anyone younger than 16years of age. · This medicine may cause adrenal gland problems, such as Cushing syndrome or high blood sugar. · Do not use this medicine to treat a skin problem your doctor has not examined. · If your symptoms do not improve after 1 or 2 weeks of treatment, or if they get worse, check with your doctor. · Keep all medicine out of the reach of children. Never share your medicine with anyone. Possible Side Effects While Using This Medicine:  
Call your doctor right away if you notice any of these side effects: · Allergic reaction: Itching or hives, swelling in your face or hands, swelling or tingling in your mouth or throat, chest tightness, trouble breathing · Color changes on the skin, dark freckles, easy bruising, muscle weakness · Round, puffy face · Severe itching, burning, or skin irritation · Signs of infection such as redness, swelling, drainage, or pus · Weight gain around your neck, upper back, breast, face, or waist 
If you notice these less serious side effects, talk with your doctor: · Numbness, tingling, or stinging at the application site If you notice other side effects that you think are caused by this medicine, tell your doctor. Call your doctor for medical advice about side effects. You may report side effects to FDA at 9-518-FDA-0933 © 2017 [address] Information is for End User's use only and may not be sold, redistributed or otherwise used for commercial purposes. The above information is an  only. It is not intended as medical advice for individual conditions or treatments. Talk to your doctor, nurse or pharmacist before following any medical regimen to see if it is safe and effective for you. Introducing [facility]! Agnieszka Valdez introduces [employer] patient portal. Now you can access parts of your medical record, email your doctor's office, and request medication refills online. 1. In your internet browser, go to [email or web address] 2. Click on the First Time User? Click Here link in the Sign In box. You will see the New Member Sign Up page. 3. Enter your [employer] Access Code exactly as it appears below. You will not need to use this code after youve completed the sign-up process. If you do not sign up before the expiration date, you must request a new code. · [employer] Access Code: [email or web address] Expires: 4/25/2018  2:32 PM 
 
4.  Enter the last four digits of your Social Security Number (xxxx) and Date of Birth (mm/dd/yyyy) as indicated and click Submit. You will be taken to the next sign-up page. 5. Create a [employer] ID. This will be your [employer] login ID and cannot be changed, so think of one that is secure and easy to remember. 6. Create a [employer] password. You can change your password at any time. 7. Enter your Password Reset Question and Answer. This can be used at a later time if you forget your password. 8. Enter your e-mail address. You will receive e-mail notification when new information is available in [address] 9. Click Sign Up. You can now view and download portions of your medical record. 10. Click the Download Summary menu link to download a portable copy of your medical information. If you have questions, please visit the Frequently Asked Questions section of the [employer] website. Remember, [employer] is NOT to be used for urgent needs. For medical emergencies, dial 911. Now available from your iPhone and Android! Please provide this summary of care documentation to your next provider. Your primary care clinician is listed as Vini Edwards. If you have any questions after today's visit, please call [phone].

## 2018-04-25 NOTE — PATIENT INSTRUCTIONS
Betamethasone/Clotrimazole (On the skin)   Betamethasone Dipropionate (bay-ta-METH-a-sone dye-PROE-pee-oh-erlinda), Clotrimazole (jkfe-GSCV-p-zole)  Treats fungus infections. This is a combination of a steroid and an antifungal medicine. Brand Name(s): DermacinRx Therazole Grady, Lotrisone   There may be other brand names for this medicine. When This Medicine Should Not Be Used: This medicine is not right for everyone. Do not use it if you had an allergic reaction to clotrimazole or betamethasone. How to Use This Medicine:   Cream, Lotion  · Your doctor will tell you how much medicine to use. Do not use more than directed. · Use this medicine only on your skin. Rinse it off right away if it gets on a cut or scrape. Do not get the medicine in your eyes, nose, or mouth. · Wash your hands with soap and water before and after you use this medicine. · Apply a thin layer of the medicine to the affected area. Rub it in gently. · Shake the bottle of lotion well just before use. · Do not cover, wrap, or wear tight fitting clothes over your treated skin areas unless directed by your doctor. Allow the medicine to dry before you put on your clothes. · It is very important that you keep using this medicine for the full time of treatment to clear up your skin problem completely. Do not miss any doses. · This medicine is not for long-term use. Do not use the cream to treat jock itch and ringworm for more than 2 weeks, and for athlete's foot for more than 4 weeks, unless your doctor has told you to. · Read and follow the patient instructions that come with this medicine. Talk to your doctor or pharmacist if you have any questions. · Missed dose: Apply a dose as soon as you can. If it is almost time for your next dose, wait until then and apply a regular dose. Do not apply extra medicine to make up for a missed dose.   · Store the medicine in a closed container at room temperature, away from heat, moisture, and direct light. Store the lotion bottle upright. Drugs and Foods to Avoid:   Ask your doctor or pharmacist before using any other medicine, including over-the-counter medicines, vitamins, and herbal products. · Some medicines can affect how betamethasone and clotrimazole combination works. Tell your doctor if you are using other steroids. Warnings While Using This Medicine:   · Tell your doctor if you are pregnant or breastfeeding, or if you have liver problems, diabetes, or a skin infection. · Do not use this medicine on anyone younger than 16years of age. · This medicine may cause adrenal gland problems, such as Cushing syndrome or high blood sugar. · Do not use this medicine to treat a skin problem your doctor has not examined. · If your symptoms do not improve after 1 or 2 weeks of treatment, or if they get worse, check with your doctor. · Keep all medicine out of the reach of children. Never share your medicine with anyone. Possible Side Effects While Using This Medicine:   Call your doctor right away if you notice any of these side effects:  · Allergic reaction: Itching or hives, swelling in your face or hands, swelling or tingling in your mouth or throat, chest tightness, trouble breathing  · Color changes on the skin, dark freckles, easy bruising, muscle weakness  · Round, puffy face  · Severe itching, burning, or skin irritation  · Signs of infection such as redness, swelling, drainage, or pus  · Weight gain around your neck, upper back, breast, face, or waist  If you notice these less serious side effects, talk with your doctor:   · Numbness, tingling, or stinging at the application site  If you notice other side effects that you think are caused by this medicine, tell your doctor. Call your doctor for medical advice about side effects.  You may report side effects to FDA at 2-633-FDA-6318  © 2017 [address] Information is for End User's use only and may not be sold, redistributed or otherwise used for commercial purposes. The above information is an  only. It is not intended as medical advice for individual conditions or treatments. Talk to your doctor, nurse or pharmacist before following any medical regimen to see if it is safe and effective for you.

## 2018-04-25 NOTE — PROGRESS NOTES
ROOM #   Identified pt with two pt identifiers(name and ). Reviewed record in preparation for visit and have obtained necessary documentation. Chief Complaint   Patient presents with    Follow Up Chronic Condition     bp      Dotty Simons preferred language for health care discussion is english/other. Is the patient using any DME equipment during OV? Mildred Pierre is due for:  Health Maintenance Due   Topic    FOOT EXAM Q1     EYE EXAM RETINAL OR DILATED Q1      Health Maintenance reviewed and discussed per provider  Please order/place referral if appropriate. Advance Directive:  1. Do you have an advance directive in place? Patient Reply: NO    2. If not, would you like material regarding how to put one in place? NO    Coordination of Care:  1. Have you been to the ER, urgent care clinic since your last visit? Hospitalized since your last visit? NO    2. Have you seen or consulted any other health care providers outside of the [address] [place] [address] since your last visit? Include any pap smears or colon screening. NO    Patient is accompanied by self I have received verbal consent from Dotty Simons to discuss any/all medical information while they are present in the room.     Learning Assessment:  Learning Assessment 2015   PRIMARY LEARNER Patient Patient   HIGHEST LEVEL OF EDUCATION - PRIMARY LEARNER  GRADUATED HIGH SCHOOL OR GED -   PRIMARY LANGUAGE ENGLISH ENGLISH   LEARNER PREFERENCE PRIMARY LISTENING READING     PICTURES -   ANSWERED BY patient patient   RELATIONSHIP SELF SELF     Depression Screening:  PHQ over the last two weeks 2018 10/26/2017 2016 2016 2016 2015 3/18/2014   Little interest or pleasure in doing things - Not at all Not at all Not at all Not at all Not at all Not at all   Feeling down, depressed or hopeless Not at all Several days Not at all Not at all Not at all Not at all Not at all   Total Score PHQ 2 - 1 0 0 0 0 0     Abuse Screening:  Abuse Screening Questionnaire 4/25/2018 5/6/2015   Do you ever feel afraid of your partner? N N   Are you in a relationship with someone who physically or mentally threatens you? N N   Is it safe for you to go home? Y Y     Fall Risk  Fall Risk Assessment, last 12 mths 4/25/2018 10/26/2017 11/18/2016 8/17/2016 1/29/2016 1/29/2015 3/18/2014   Able to walk? Yes Yes Yes Yes Yes Yes Yes   Fall in past 12 months? No No No No No No No           Abuse Screening:  Abuse Screening Questionnaire 4/25/2018 5/6/2015   Do you ever feel afraid of your partner? N N   Are you in a relationship with someone who physically or mentally threatens you? N N   Is it safe for you to go home? Y Y       Health Maintenance reviewed and discussed per provider:      Coordination of Care:    1. Have you been to the ER, urgent care clinic since your last visit? Hospitalized since your last visit? 2. Have you seen or consulted any other health care providers outside of the [address] [place] [address] since your last visit? Include any pap smears or colon screening.

## 2018-04-25 NOTE — PROGRESS NOTES
HISTORY OF PRESENT ILLNESS  Boogie Lester is a 80 y.o. female. HPI Comments: 81 yo female here for f/u of HTN, DM, rash. HTN has been stable on home monitoring. Still with SBP in 150s. No CP. Has nephrology appt pending next month. DM has been controlled on home monitoring ranging 90s-140s. Note lesion on tip of nose for past few months. Not enlarging. Continued rash on BLE with itching. Betamethasone helped some. Review of Systems   Constitutional: Negative for chills, fever and weight loss. HENT: Negative for congestion and ear pain. Eyes: Negative for blurred vision and pain. Respiratory: Negative for cough and shortness of breath. Cardiovascular: Negative for chest pain, palpitations and leg swelling. Gastrointestinal: Negative for nausea and vomiting. Genitourinary: Negative for frequency and urgency. Musculoskeletal: Negative for joint pain and myalgias. Skin: Positive for itching and rash (Ble). Neurological: Negative for dizziness, tingling and headaches. Psychiatric/Behavioral: Negative for depression. The patient is not nervous/anxious. Past Medical History:   Diagnosis Date    Diabetes ([facility]     Hypertension     Other and unspecified hyperlipidemia 2/16/2012     Current Outpatient Prescriptions on File Prior to Visit   Medication Sig Dispense Refill    atenolol (TENORMIN) 50 mg tablet Take 1 Tab by mouth two (2) times a day. 180 Tab 3    amLODIPine (NORVASC) 10 mg tablet TAKE 1 TABLET BY MOUTH DAILY 60 Tab 5    losartan-hydroCHLOROthiazide (HYZAAR) 100-25 mg per tablet Take 1 Tab by mouth daily. 90 Tab 5    insulin NPH (HUMULIN N) 100 unit/mL injection INJECT 25 UNITS BY SUBCUTANEOUSLY IN THE MORNING AND 15 UNITS SUBCUTANEOUSLY IN THE EVENING 20 mL 5    simvastatin (ZOCOR) 20 mg tablet TAKE 1 TABLET BY MOUTH EVERY OTHER DAY 45 Tab 3    betamethasone dipropionate (DIPROSONE) 0.05 % topical cream Apply  to affected area two (2) times a day.  45 g 1    insulin lispro (HUMALOG) 100 unit/mL injection 5 units before the  breakfast 1 Vial 5     No current facility-administered medications on file prior to visit. Physical Exam   Constitutional: She appears well-developed and well-nourished. No distress. /65 (BP 1 Location: Right arm, BP Patient Position: Sitting)  Pulse (!) 53  Temp 96.8 °F (36 °C) (Oral)   Resp 18  Ht 5' 5\" (1.651 m)  Wt 158 lb 6.4 oz (71.8 kg)  SpO2 99%  BMI 26.36 kg/m2     Eyes: EOM are normal. Right eye exhibits no discharge. Left eye exhibits no discharge. No scleral icterus. Neck: Neck supple. Cardiovascular: Normal rate, regular rhythm and normal heart sounds. Exam reveals no gallop and no friction rub. No murmur heard. Pulmonary/Chest: Effort normal and breath sounds normal. No respiratory distress. She has no wheezes. She has no rales. Musculoskeletal: She exhibits no edema or tenderness. Lymphadenopathy:     She has no cervical adenopathy. Neurological: She is alert. She exhibits normal muscle tone. Skin: Skin is warm and dry. Rash (scattered lesions BLE, red, somewhat circular) noted. Firm papule tip of nose   Psychiatric: She has a normal mood and affect. Lab Results   Component Value Date/Time    Sodium 142 01/25/2018 01:39 PM    Potassium 4.2 01/25/2018 01:39 PM    Chloride 107 01/25/2018 01:39 PM    CO2 28 01/25/2018 01:39 PM    Anion gap 7 01/25/2018 01:39 PM    Glucose 57 (L) 01/25/2018 01:39 PM    BUN 20 (H) 01/25/2018 01:39 PM    Creatinine 1.32 (H) 01/25/2018 01:39 PM    BUN/Creatinine ratio 15 01/25/2018 01:39 PM    GFR est AA 46 (L) 01/25/2018 01:39 PM    GFR est non-AA 38 (L) 01/25/2018 01:39 PM    Calcium 9.2 02/01/2018 11:46 AM    Bilirubin, total 0.8 01/25/2018 01:39 PM    AST (SGOT) 12 (L) 01/25/2018 01:39 PM    Alk.  phosphatase 229 (H) 01/25/2018 01:39 PM    Protein, total 7.1 01/25/2018 01:39 PM    Albumin 3.4 01/25/2018 01:39 PM    Globulin 3.7 01/25/2018 01:39 PM    A-G Ratio 0.9 01/25/2018 01:39 PM    ALT (SGPT) 20 01/25/2018 01:39 PM     Lab Results   Component Value Date/Time    Hemoglobin A1c 7.3 (H) 01/25/2018 01:39 PM    Hemoglobin A1c (POC) 7.7 11/18/2016 03:07 PM     Lab Results   Component Value Date/Time    Cholesterol, total 166 01/25/2018 01:39 PM    HDL Cholesterol 42 01/25/2018 01:39 PM    LDL, calculated 101.8 (H) 01/25/2018 01:39 PM    VLDL, calculated 22.2 01/25/2018 01:39 PM    Triglyceride 111 01/25/2018 01:39 PM    CHOL/HDL Ratio 4.0 01/25/2018 01:39 PM     Lab Results   Component Value Date/Time    Microalbumin/Creat ratio (mg/g creat) 646 (H) 07/26/2017 12:33 PM    Microalbumin,urine random 31.10 (H) 07/26/2017 12:33 PM       ASSESSMENT and PLAN    ICD-10-CM ICD-9-CM    1. Type 2 diabetes mellitus with nephropathy (HCC) E11.21 250.40      583.81    2. Essential hypertension I10 401.9    3. Pure hypercholesterolemia E78.00 272.0    4. Rash R21 782. 1      Will continue with current medication for now except with try clotrimazole-betamethasone ? Fungal  Appt as planned with nephrology. Can repeat A1c next visit. Declines derm evaluation at this time.  She will let me know if skin lesion on her nose enlarges/changes

## 2018-04-27 DIAGNOSIS — E11.21 TYPE 2 DIABETES MELLITUS WITH NEPHROPATHY (HCC): Primary | ICD-10-CM

## 2018-04-27 RX ORDER — LANCETS
EACH MISCELLANEOUS
Qty: 300 EACH | Refills: 3 | Status: SHIPPED | OUTPATIENT
Start: 2018-04-27

## 2018-04-27 RX ORDER — INSULIN PUMP SYRINGE, 3 ML
EACH MISCELLANEOUS
Qty: 1 KIT | Refills: 0 | Status: SHIPPED | OUTPATIENT
Start: 2018-04-27

## 2018-05-09 ENCOUNTER — HOSPITAL ENCOUNTER (OUTPATIENT)
Dept: LAB | Age: 83
Discharge: HOME | End: 2018-05-09
Payer: MEDICARE

## 2018-05-09 DIAGNOSIS — N18.30 CHRONIC KIDNEY DISEASE, STAGE III (MODERATE) (HCC): ICD-10-CM

## 2018-05-09 DIAGNOSIS — R80.9 PROTEINURIA: ICD-10-CM

## 2018-05-09 LAB
ALBUMIN SERPL-MCNC: 3.4 G/DL (ref 3.4–5)
ANION GAP SERPL CALC-SCNC: 9 MMOL/L (ref 3–18)
BASOPHILS # BLD: ABNORMAL K/UL
BASOPHILS NFR BLD: ABNORMAL %
BUN SERPL-MCNC: 24 MG/DL (ref 7–18)
BUN/CREAT SERPL: 18 (ref 12–20)
CALCIUM SERPL-MCNC: 8.9 MG/DL (ref 8.5–10.1)
CALCIUM SERPL-MCNC: 8.9 MG/DL (ref 8.5–10.1)
CHLORIDE SERPL-SCNC: 106 MMOL/L (ref 100–108)
CK SERPL-CCNC: 93 U/L (ref 26–192)
CO2 SERPL-SCNC: 27 MMOL/L (ref 21–32)
CREAT SERPL-MCNC: 1.3 MG/DL (ref 0.6–1.3)
CREAT UR-MCNC: 57.6 MG/DL (ref 30–125)
DIFFERENTIAL METHOD BLD: ABNORMAL
EOSINOPHIL # BLD: ABNORMAL K/UL
EOSINOPHIL NFR BLD: ABNORMAL %
ERYTHROCYTE [DISTWIDTH] IN BLOOD BY AUTOMATED COUNT: 18.1 % (ref 11.6–14.5)
GLUCOSE SERPL-MCNC: 154 MG/DL (ref 74–99)
HCT VFR BLD AUTO: 33.8 % (ref 35–45)
HGB BLD-MCNC: 10.8 G/DL (ref 12–16)
LYMPHOCYTES # BLD: ABNORMAL K/UL
LYMPHOCYTES NFR BLD: ABNORMAL %
MAGNESIUM SERPL-MCNC: 2 MG/DL (ref 1.6–2.6)
MCH RBC QN AUTO: 24.8 PG (ref 24–34)
MCHC RBC AUTO-ENTMCNC: 32 G/DL (ref 31–37)
MCV RBC AUTO: 77.7 FL (ref 74–97)
MONOCYTES # BLD: ABNORMAL K/UL
MONOCYTES NFR BLD: ABNORMAL %
NEUTS SEG # BLD: ABNORMAL K/UL
NEUTS SEG NFR BLD: ABNORMAL %
PHOSPHATE SERPL-MCNC: 3.5 MG/DL (ref 2.5–4.9)
PLATELET # BLD AUTO: 285 K/UL (ref 135–420)
POTASSIUM SERPL-SCNC: 3.8 MMOL/L (ref 3.5–5.5)
PROT UR-MCNC: 74 MG/DL
PTH-INTACT SERPL-MCNC: 120.7 PG/ML (ref 18.4–88)
RBC # BLD AUTO: 4.35 M/UL (ref 4.2–5.3)
SODIUM SERPL-SCNC: 142 MMOL/L (ref 136–145)
URATE SERPL-MCNC: 6.2 MG/DL (ref 2.6–7.2)
WBC # BLD AUTO: 8.8 K/UL (ref 4.6–13.2)

## 2018-05-09 PROCEDURE — 82570 ASSAY OF URINE CREATININE: CPT | Performed by: INTERNAL MEDICINE

## 2018-05-09 PROCEDURE — 80048 BASIC METABOLIC PNL TOTAL CA: CPT | Performed by: INTERNAL MEDICINE

## 2018-05-09 PROCEDURE — 82784 ASSAY IGA/IGD/IGG/IGM EACH: CPT | Performed by: INTERNAL MEDICINE

## 2018-05-09 PROCEDURE — 82550 ASSAY OF CK (CPK): CPT | Performed by: INTERNAL MEDICINE

## 2018-05-09 PROCEDURE — 83735 ASSAY OF MAGNESIUM: CPT | Performed by: INTERNAL MEDICINE

## 2018-05-09 PROCEDURE — 83970 ASSAY OF PARATHORMONE: CPT | Performed by: INTERNAL MEDICINE

## 2018-05-09 PROCEDURE — 84550 ASSAY OF BLOOD/URIC ACID: CPT | Performed by: INTERNAL MEDICINE

## 2018-05-09 PROCEDURE — 36415 COLL VENOUS BLD VENIPUNCTURE: CPT | Performed by: INTERNAL MEDICINE

## 2018-05-09 PROCEDURE — 84156 ASSAY OF PROTEIN URINE: CPT | Performed by: INTERNAL MEDICINE

## 2018-05-09 PROCEDURE — 83883 ASSAY NEPHELOMETRY NOT SPEC: CPT | Performed by: INTERNAL MEDICINE

## 2018-05-09 PROCEDURE — 86335 IMMUNFIX E-PHORSIS/URINE/CSF: CPT | Performed by: INTERNAL MEDICINE

## 2018-05-09 PROCEDURE — 84100 ASSAY OF PHOSPHORUS: CPT | Performed by: INTERNAL MEDICINE

## 2018-05-09 PROCEDURE — 85025 COMPLETE CBC W/AUTO DIFF WBC: CPT | Performed by: INTERNAL MEDICINE

## 2018-05-09 PROCEDURE — 82040 ASSAY OF SERUM ALBUMIN: CPT | Performed by: INTERNAL MEDICINE

## 2018-05-11 LAB
INTERPRETATION UR IFE-IMP: NORMAL
KAPPA LC FREE SER-MCNC: 58.4 MG/L (ref 3.3–19.4)
KAPPA LC FREE/LAMBDA FREE SER: 2.93 {RATIO} (ref 0.26–1.65)
LAMBDA LC FREE SERPL-MCNC: 19.9 MG/L (ref 5.7–26.3)

## 2018-05-14 LAB
IGA SERPL-MCNC: 286 MG/DL (ref 64–422)
IGG SERPL-MCNC: 1048 MG/DL (ref 700–1600)
IGM SERPL-MCNC: 55 MG/DL (ref 26–217)
PROT PATTERN SERPL IFE-IMP: NORMAL

## 2018-06-13 RX ORDER — AMLODIPINE BESYLATE 10 MG/1
TABLET ORAL
Qty: 90 TAB | Refills: 4 | Status: SHIPPED | OUTPATIENT
Start: 2018-06-13 | End: 2019-09-03 | Stop reason: SDUPTHER

## 2018-06-19 ENCOUNTER — HOSPITAL ENCOUNTER (OUTPATIENT)
Dept: LAB | Age: 83
Discharge: HOME | End: 2018-06-19
Payer: MEDICARE

## 2018-06-19 DIAGNOSIS — D64.9 ANEMIA: ICD-10-CM

## 2018-06-19 LAB
FERRITIN SERPL-MCNC: 415 NG/ML (ref 8–388)
FOLATE SERPL-MCNC: 10 NG/ML (ref 3.1–17.5)
IRON SATN MFR SERPL: 19 %
IRON SERPL-MCNC: 40 UG/DL (ref 50–175)
TIBC SERPL-MCNC: 212 UG/DL (ref 250–450)
VIT B12 SERPL-MCNC: 417 PG/ML (ref 211–911)

## 2018-06-19 PROCEDURE — 83540 ASSAY OF IRON: CPT | Performed by: INTERNAL MEDICINE

## 2018-06-19 PROCEDURE — 82746 ASSAY OF FOLIC ACID SERUM: CPT | Performed by: INTERNAL MEDICINE

## 2018-06-19 PROCEDURE — 82728 ASSAY OF FERRITIN: CPT | Performed by: INTERNAL MEDICINE

## 2018-06-19 PROCEDURE — 36415 COLL VENOUS BLD VENIPUNCTURE: CPT | Performed by: INTERNAL MEDICINE

## 2018-08-21 ENCOUNTER — OFFICE VISIT (OUTPATIENT)
Dept: INTERNAL MEDICINE CLINIC | Age: 83
End: 2018-08-21

## 2018-08-21 VITALS
SYSTOLIC BLOOD PRESSURE: 162 MMHG | HEIGHT: 65 IN | TEMPERATURE: 96.9 F | HEART RATE: 53 BPM | DIASTOLIC BLOOD PRESSURE: 54 MMHG | OXYGEN SATURATION: 95 % | RESPIRATION RATE: 16 BRPM | WEIGHT: 156.4 LBS | BODY MASS INDEX: 26.06 KG/M2

## 2018-08-21 DIAGNOSIS — E11.21 TYPE 2 DIABETES MELLITUS WITH NEPHROPATHY (HCC): Primary | ICD-10-CM

## 2018-08-21 DIAGNOSIS — I10 ESSENTIAL HYPERTENSION: ICD-10-CM

## 2018-08-21 RX ORDER — LANOLIN ALCOHOL/MO/W.PET/CERES
CREAM (GRAM) TOPICAL
Refills: 12 | COMMUNITY
Start: 2018-07-10 | End: 2020-02-25

## 2018-08-21 RX ORDER — HYDRALAZINE HYDROCHLORIDE 50 MG/1
50 TABLET, FILM COATED ORAL 3 TIMES DAILY
Qty: 90 TAB | Refills: 4
Start: 2018-08-21 | End: 2019-03-23

## 2018-08-21 RX ORDER — HYDRALAZINE HYDROCHLORIDE 50 MG/1
TABLET, FILM COATED ORAL
Refills: 4 | COMMUNITY
Start: 2018-07-09 | End: 2018-08-21 | Stop reason: SDUPTHER

## 2018-08-21 NOTE — PROGRESS NOTES
ROOM # [id number]  Identified pt with two pt identifiers(name and ). Reviewed record in preparation for visit and have obtained necessary documentation. Chief Complaint   Patient presents with    Hypertension    Diabetes      Andrea Verde preferred language for health care discussion is english/other. Is the patient using any DME equipment during OV? [place] is due for:  Health Maintenance Due   Topic    FOOT EXAM Q1     HEMOGLOBIN A1C Q6M     Influenza Age 5 to Adult      Health Maintenance reviewed and discussed per provider  Please order/place referral if appropriate. Advance Directive:  1. Do you have an advance directive in place? Patient Reply: NO    2. If not, would you like material regarding how to put one in place? NO    Coordination of Care:  1. Have you been to the ER, urgent care clinic since your last visit? Hospitalized since your last visit? YES    2. Have you seen or consulted any other health care providers outside of the [facility] since your last visit? Include any pap smears or colon screening. YES    Patient is accompanied by self I have received verbal consent from Andrea Verde to discuss any/all medical information while they are present in the room.     Learning Assessment:  Learning Assessment 2015   PRIMARY LEARNER Patient Patient   HIGHEST LEVEL OF EDUCATION - PRIMARY LEARNER  GRADUATED HIGH SCHOOL OR GED -   PRIMARY LANGUAGE ENGLISH ENGLISH   LEARNER PREFERENCE PRIMARY LISTENING READING     PICTURES -   ANSWERED BY patient patient   RELATIONSHIP SELF SELF     Depression Screening:  PHQ over the last two weeks 2018 2018 10/26/2017 2016 2016 2016 2015   Little interest or pleasure in doing things Not at all - Not at all Not at all Not at all Not at all Not at all   Feeling down, depressed, irritable, or hopeless Not at all Not at all Several days Not at all Not at all Not at all Not at all Total Score PHQ 2 0 - 1 0 0 0 0     Abuse Screening:  Abuse Screening Questionnaire 4/25/2018 5/6/2015   Do you ever feel afraid of your partner? N N   Are you in a relationship with someone who physically or mentally threatens you? N N   Is it safe for you to go home? Y Y     Fall Risk  Fall Risk Assessment, last 12 mths 8/21/2018 4/25/2018 10/26/2017 11/18/2016 8/17/2016 1/29/2016 1/29/2015   Able to walk? Yes Yes Yes Yes Yes Yes Yes   Fall in past 12 months?  No No No No No No No

## 2018-08-21 NOTE — PROGRESS NOTES
HISTORY OF PRESENT ILLNESS  Fly Saavedra is a 80 y.o. female. HPI Comments: 79 yo female here for f/u of DM, HTN. HTN BP elevated at home as well. No CP, SOB. DM highest 140s. Checks several times a week. Stable by last A1c. One week of bloating, diarrhea recently. Sx are now better. Uses maalox prn. Review of Systems   Constitutional: Negative for chills, fever and weight loss. HENT: Negative for congestion and ear pain. Eyes: Negative for blurred vision and pain. Respiratory: Negative for cough and shortness of breath. Cardiovascular: Negative for chest pain, palpitations and leg swelling. Gastrointestinal: Positive for diarrhea (resolved). Negative for nausea and vomiting. Genitourinary: Negative for frequency and urgency. Musculoskeletal: Negative for joint pain and myalgias. Skin: Negative for itching and rash. Neurological: Negative for dizziness, tingling and headaches. Psychiatric/Behavioral: Negative for depression. The patient is not nervous/anxious.       Past Medical History:   Diagnosis Date    Diabetes ([facility] [address]     Hypertension     Other and unspecified hyperlipidemia 2/16/2012     Current Outpatient Prescriptions on File Prior to Visit   Medication Sig Dispense Refill    amLODIPine (NORVASC) 10 mg tablet TAKE ONE TABLET BY MOUTH ONE TIME DAILY 90 Tab 4    Blood-Glucose Meter monitoring kit Use as directed to test blood glucose TID 1 Kit 0    glucose blood VI test strips (ASCENSIA AUTODISC VI, ONE TOUCH ULTRA TEST VI) strip Use as directed to test blood glucose  Strip 3    Lancets misc Use as directed to test blood glucose  Each 3    insulin lispro (HUMALOG) 100 unit/mL injection 5 units before the  breakfast  Indications: type 2 diabetes mellitus 1 Vial 5    simvastatin (ZOCOR) 20 mg tablet TAKE 1 TABLET BY MOUTH EVERY OTHER DAY 45 Tab 3    clotrimazole-betamethasone (LOTRISONE) topical cream Apply to affected area twice daily 45 g 1    atenolol (TENORMIN) 50 mg tablet Take 1 Tab by mouth two (2) times a day. 180 Tab 3    losartan-hydroCHLOROthiazide (HYZAAR) 100-25 mg per tablet Take 1 Tab by mouth daily. 90 Tab 5    insulin NPH (HUMULIN N) 100 unit/mL injection INJECT 25 UNITS BY SUBCUTANEOUSLY IN THE MORNING AND 15 UNITS SUBCUTANEOUSLY IN THE EVENING 20 mL 5    betamethasone dipropionate (DIPROSONE) 0.05 % topical cream Apply  to affected area two (2) times a day. 45 g 1     No current facility-administered medications on file prior to visit. Social History   Substance Use Topics    Smoking status: Current Every Day Smoker     Packs/day: 0.50     Years: 40.00    Smokeless tobacco: Never Used    Alcohol use No     Physical Exam   Constitutional: She appears well-developed and well-nourished. No distress. /54 (BP 1 Location: Left arm, BP Patient Position: Sitting)  Pulse (!) 53  Temp 96.9 °F (36.1 °C) (Oral)   Resp 16  Ht 5' 5\" (1.651 m)  Wt 156 lb 6.4 oz (70.9 kg)  SpO2 95%  BMI 26.03 kg/m2     Eyes: EOM are normal. Right eye exhibits no discharge. Left eye exhibits no discharge. No scleral icterus. Neck: Neck supple. Cardiovascular: Normal rate, regular rhythm and normal heart sounds. Exam reveals no gallop and no friction rub. No murmur heard. Pulmonary/Chest: Effort normal and breath sounds normal. No respiratory distress. She has no wheezes. She has no rales. Musculoskeletal: She exhibits no edema or tenderness. Lymphadenopathy:     She has no cervical adenopathy. Neurological: She is alert. She exhibits normal muscle tone. Skin: Skin is warm and dry. Psychiatric: She has a normal mood and affect. Diabetic Foot exam: 2+ dorsalis pedis pulses bilaterally. Positive monofilament in all 10 toes and bottoms of both feet. Negative for lesions, signs of infection, or foot abnormalities.     Lab Results   Component Value Date/Time    Hemoglobin A1c 7.3 (H) 01/25/2018 01:39 PM    Hemoglobin A1c (POC) 7.7 11/18/2016 03:07 PM     Lab Results   Component Value Date/Time    Microalbumin/Creat ratio (mg/g creat) 646 (H) 07/26/2017 12:33 PM    Microalbumin,urine random 31.10 (H) 07/26/2017 12:33 PM     Lab Results   Component Value Date/Time    Cholesterol, total 166 01/25/2018 01:39 PM    HDL Cholesterol 42 01/25/2018 01:39 PM    LDL, calculated 101.8 (H) 01/25/2018 01:39 PM    VLDL, calculated 22.2 01/25/2018 01:39 PM    Triglyceride 111 01/25/2018 01:39 PM    CHOL/HDL Ratio 4.0 01/25/2018 01:39 PM     Lab Results   Component Value Date/Time    Sodium 142 05/09/2018 07:58 AM    Potassium 3.8 05/09/2018 07:58 AM    Chloride 106 05/09/2018 07:58 AM    CO2 27 05/09/2018 07:58 AM    Anion gap 9 05/09/2018 07:58 AM    Glucose 154 (H) 05/09/2018 07:58 AM    BUN 24 (H) 05/09/2018 07:58 AM    Creatinine 1.30 05/09/2018 07:58 AM    BUN/Creatinine ratio 18 05/09/2018 07:58 AM    GFR est AA 47 (L) 05/09/2018 07:58 AM    GFR est non-AA 39 (L) 05/09/2018 07:58 AM    Calcium 8.9 05/09/2018 08:14 AM    Bilirubin, total 0.8 01/25/2018 01:39 PM    AST (SGOT) 12 (L) 01/25/2018 01:39 PM    Alk. phosphatase 229 (H) 01/25/2018 01:39 PM    Protein, total 7.1 01/25/2018 01:39 PM    Albumin 3.4 05/09/2018 07:58 AM    Globulin 3.7 01/25/2018 01:39 PM    A-G Ratio 0.9 01/25/2018 01:39 PM    ALT (SGPT) 20 01/25/2018 01:39 PM     Lab Results   Component Value Date/Time    WBC 8.8 05/09/2018 07:58 AM    WBC 8.4 05/17/2012 02:18 PM    HGB 10.8 (L) 05/09/2018 07:58 AM    HCT 33.8 (L) 05/09/2018 07:58 AM    PLATELET 605 53/57/4533 07:58 AM    MCV 77.7 05/09/2018 07:58 AM     ASSESSMENT and PLAN    ICD-10-CM ICD-9-CM    1. Type 2 diabetes mellitus with nephropathy (HCC) E11.21 250.40 AMB POC HEMOGLOBIN A1C     583.81    2. Essential hypertension I10 401.9      Repeat A1c today is well controlled.    Continue f/u with nephrology  Will try increasing hydralazine to TID

## 2018-08-21 NOTE — PATIENT INSTRUCTIONS
Diabetes Foot Health: Care Instructions  Your Care Instructions    When you have diabetes, your feet need extra care and attention. Diabetes can damage the nerve endings and blood vessels in your feet, making you less likely to notice when your feet are injured. Diabetes also limits your body's ability to fight infection and get blood to areas that need it. If you get a minor foot injury, it could become an ulcer or a serious infection. With good foot care, you can prevent most of these problems. Caring for your feet can be quick and easy. Most of the care can be done when you are bathing or getting ready for bed. Follow-up care is a key part of your treatment and safety. Be sure to make and go to all appointments, and call your doctor if you are having problems. It's also a good idea to know your test results and keep a list of the medicines you take. How can you care for yourself at home? · Keep your blood sugar close to normal by watching what and how much you eat, monitoring blood sugar, taking medicines if prescribed, and getting regular exercise. · Do not smoke. Smoking affects blood flow and can make foot problems worse. If you need help quitting, talk to your doctor about stop-smoking programs and medicines. These can increase your chances of quitting for good. · Eat a diet that is low in fats. High fat intake can cause fat to build up in your blood vessels and decrease blood flow. · Inspect your feet daily for blisters, cuts, cracks, or sores. If you cannot see well, use a mirror or have someone help you. · Take care of your feet:  [facility] your feet every day. Use warm (not hot) water. Check the water temperature with your wrists or other part of your body, not your feet. ¨ Dry your feet well. Pat them dry. Do not rub the skin on your feet too hard. Dry well between your toes. If the skin on your feet stays moist, bacteria or a fungus can grow, which can lead to infection.   ¨ Keep your skin RE:RESULTS     MD Keon Hale MD 2 hours ago (8:55 AM)         Thank you for both the mammogram report and the PAP test report.     My DEXA scan did show some decrease in femur density, now falling into abnormal range.  Will take calcium more regularly.  Should I restart reloxifen?   Thanks   Lorin         soft. Use moisturizing skin cream to keep the skin on your feet soft and prevent calluses and cracks. But do not put the cream between your toes, and stop using any cream that causes a rash. ¨ Clean underneath your toenails carefully. Do not use a sharp object to clean underneath your toenails. Use the blunt end of a nail file or other rounded tool. ¨ Trim and file your toenails straight across to prevent ingrown toenails. Use a nail clipper, not scissors. Use an emery board to smooth the edges. · Change socks daily. Socks without seams are best, because seams often rub the feet. You can find socks for people with diabetes from specialty catalogs. · Look inside your shoes every day for things like gravel or torn linings, which could cause blisters or sores. · Buy shoes that fit well:  ¨ Look for shoes that have plenty of space around the toes. This helps prevent bunions and blisters. ¨ Try on shoes while wearing the kind of socks you will usually wear with the shoes. ¨ Avoid plastic shoes. They may rub your feet and cause blisters. Good shoes should be made of materials that are flexible and breathable, such as leather or cloth. ¨ Break in new shoes slowly by wearing them for no more than an hour a day for several days. Take extra time to check your feet for red areas, blisters, or other problems after you wear new shoes. · Do not go barefoot. Do not wear sandals, and do not wear shoes with very thin soles. Thin soles are easy to puncture. They also do not protect your feet from hot pavement or cold weather. · Have your doctor check your feet during each visit. If you have a foot problem, see your doctor. Do not try to treat an early foot problem at home. Home remedies or treatments that you can buy without a prescription (such as corn removers) can be harmful. · Always get early treatment for foot problems. A minor irritation can lead to a major problem if not properly cared for early.   When should you call for help? Call your doctor now or seek immediate medical care if:    · You have a foot sore, an ulcer or break in the skin that is not healing after 4 days, bleeding corns or calluses, or an ingrown toenail.     · You have blue or black areas, which can mean bruising or blood flow problems.     · You have peeling skin or tiny blisters between your toes or cracking or oozing of the skin.     · You have a fever for more than 24 hours and a foot sore.     · You have new numbness or tingling in your feet that does not go away after you move your feet or change positions.     · You have unexplained or unusual swelling of the foot or ankle.    Watch closely for changes in your health, and be sure to contact your doctor if:    · You cannot do proper foot care. Where can you learn more? Go to http://mukund-kun.info/. Enter A739 in the search box to learn more about \"Diabetes Foot Health: Care Instructions. \"  Current as of: December 7, 2017  Content Version: 11.7  © 6779-5118 [employer]. Care instructions adapted under license by [employer] (which disclaims liability or warranty for this information). If you have questions about a medical condition or this instruction, always ask your healthcare professional. [address] any warranty or liability for your use of this information.

## 2018-08-21 NOTE — MR AVS SNAPSHOT
[address] [facility] [id number] 
[phone] Patient: Joanne Hernandez MRN: [id number] Visit Information Date & Time Provider Department Dept. Phone Encounter #  
 8/21/2018 12:45 PM Kimberley Juan [address] [phone] [id number] Follow-up Instructions Return in about 4 months (around 12/21/2018) for diabetes, hypertension. Upcoming Health Maintenance Date Due HEMOGLOBIN A1C Q6M 7/25/2018 Influenza Age 5 to Adult 8/1/2018 MICROALBUMIN Q1 1/25/2019 LIPID PANEL Q1 1/25/2019 MEDICARE YEARLY EXAM 1/26/2019 EYE EXAM RETINAL OR DILATED Q1 3/12/2019 FOOT EXAM Q1 8/21/2019 GLAUCOMA SCREENING Q2Y 3/12/2020 DTaP/Tdap/Td series (2 - Td) 7/26/2027 Allergies as of 8/21/2018  Review Complete On: 8/21/2018 By: Alanis Lamas Severity Noted Reaction Type Reactions Aspirin  10/29/2015    Nausea Only Bactrim [Sulfamethoprim]  03/18/2014    Nausea Only And dizziness and loss of appetite Clindamycin  01/25/2018    Other (comments) Tongue pain and metallic taste Pcn [Penicillins]  02/16/2012    Nausea and Vomiting, Hives Sulfamethoxazole-trimethoprim  10/29/2015    Itching Tetanus Toxoid,fluid  10/29/2015    Hives Tetracycline  10/29/2015    Nausea Only Tetracyclines  03/18/2014    Other (comments) Upset stomach Tramadol  08/10/2015    Other (comments) Nausea and vomiting Current Immunizations  Reviewed on 8/20/2018 Name Date Influenza High Dose Vaccine PF 10/26/2017, 11/18/2016  3:08 PM, 10/29/2015 Influenza Vaccine 9/18/2014 12:32 PM  
 Influenza Vaccine Split 9/19/2012 Pneumococcal Conjugate (PCV-13) 5/4/2016 Pneumococcal Polysaccharide (PPSV-23) 12/17/2013  2:19 PM  
  
 Not reviewed this visit You Were Diagnosed With   
  
 Codes Comments Type 2 diabetes mellitus with nephropathy ([facility].)    -  Primary ICD-10-CM: E11.21 
ICD-9-CM: 250.40, 583.81 Essential hypertension     ICD-10-CM: I10 
ICD-9-CM: 401.9 Vitals BP Pulse Temp Resp Height(growth percentile) Weight(growth percentile) 162/54 (BP 1 Location: Left arm, BP Patient Position: Sitting) (!) 53 96.9 °F (36.1 °C) (Oral) 16 5' 5\" (1.651 m) 156 lb 6.4 oz (70.9 kg) SpO2 BMI OB Status Smoking Status 95% 26.03 kg/m2 Postmenopausal Current Every Day Smoker Vitals History BMI and BSA Data Body Mass Index Body Surface Area 26.03 kg/m 2 1.8 m 2 Preferred Pharmacy Pharmacy Name Phone [facility] [address] [place] [address] Ruy Cobb Your Updated Medication List  
  
   
This list is accurate as of 8/21/18  1:03 PM.  Always use your most recent med list. amLODIPine 10 mg tablet Commonly known as:  Cespedes Cater TAKE ONE TABLET BY MOUTH ONE TIME DAILY  
  
 atenolol 50 mg tablet Commonly known as:  TENORMIN Take 1 Tab by mouth two (2) times a day. betamethasone dipropionate 0.05 % topical cream  
Commonly known as:  Guicho Home Apply  to affected area two (2) times a day. Blood-Glucose Meter monitoring kit Use as directed to test blood glucose TID  
  
 clotrimazole-betamethasone topical cream  
Commonly known as:  Nabil Newmans Apply to affected area twice daily  
  
 ferrous sulfate 325 mg (65 mg iron) tablet TK 1 T PO D  
  
 glucose blood VI test strips strip Commonly known as:  ASCENSIA AUTODISC VI, ONE TOUCH ULTRA TEST VI  
Use as directed to test blood glucose TID  
  
 hydrALAZINE 50 mg tablet Commonly known as:  APRESOLINE Take 1 Tab by mouth three (3) times daily. insulin lispro 100 unit/mL injection Commonly known as:  HUMALOG  
5 units before the  breakfast  Indications: type 2 diabetes mellitus insulin  unit/mL injection Commonly known as:  HumuLIN N NPH U-100 Insulin INJECT 25 UNITS BY SUBCUTANEOUSLY IN THE MORNING AND 15 UNITS SUBCUTANEOUSLY IN THE EVENING Lancets Misc Use as directed to test blood glucose TID  
  
 losartan-hydroCHLOROthiazide 100-25 mg per tablet Commonly known as:  HYZAAR Take 1 Tab by mouth daily. simvastatin 20 mg tablet Commonly known as:  ZOCOR  
TAKE 1 TABLET BY MOUTH EVERY OTHER DAY We Performed the Following AMB POC HEMOGLOBIN A1C [71280 CPT(R)]  DIABETES FOOT EXAM [7 Custom] Follow-up Instructions Return in about 4 months (around 12/21/2018) for diabetes, hypertension. Patient Instructions Diabetes Foot Health: Care Instructions Your Care Instructions When you have diabetes, your feet need extra care and attention. Diabetes can damage the nerve endings and blood vessels in your feet, making you less likely to notice when your feet are injured. Diabetes also limits your body's ability to fight infection and get blood to areas that need it. If you get a minor foot injury, it could become an ulcer or a serious infection. With good foot care, you can prevent most of these problems. Caring for your feet can be quick and easy. Most of the care can be done when you are bathing or getting ready for bed. Follow-up care is a key part of your treatment and safety. Be sure to make and go to all appointments, and call your doctor if you are having problems. It's also a good idea to know your test results and keep a list of the medicines you take. How can you care for yourself at home? · Keep your blood sugar close to normal by watching what and how much you eat, monitoring blood sugar, taking medicines if prescribed, and getting regular exercise. · Do not smoke. Smoking affects blood flow and can make foot problems worse.  If you need help quitting, talk to your doctor about stop-smoking programs and medicines. These can increase your chances of quitting for good. · Eat a diet that is low in fats. High fat intake can cause fat to build up in your blood vessels and decrease blood flow. · Inspect your feet daily for blisters, cuts, cracks, or sores. If you cannot see well, use a mirror or have someone help you. · Take care of your feet: 
[facility] your feet every day. Use warm (not hot) water. Check the water temperature with your wrists or other part of your body, not your feet. ¨ Dry your feet well. Pat them dry. Do not rub the skin on your feet too hard. Dry well between your toes. If the skin on your feet stays moist, bacteria or a fungus can grow, which can lead to infection. ¨ Keep your skin soft. Use moisturizing skin cream to keep the skin on your feet soft and prevent calluses and cracks. But do not put the cream between your toes, and stop using any cream that causes a rash. ¨ Clean underneath your toenails carefully. Do not use a sharp object to clean underneath your toenails. Use the blunt end of a nail file or other rounded tool. ¨ Trim and file your toenails straight across to prevent ingrown toenails. Use a nail clipper, not scissors. Use an emery board to smooth the edges. · Change socks daily. Socks without seams are best, because seams often rub the feet. You can find socks for people with diabetes from specialty catalogs. · Look inside your shoes every day for things like gravel or torn linings, which could cause blisters or sores. · Buy shoes that fit well: 
¨ Look for shoes that have plenty of space around the toes. This helps prevent bunions and blisters. ¨ Try on shoes while wearing the kind of socks you will usually wear with the shoes. ¨ Avoid plastic shoes. They may rub your feet and cause blisters. Good shoes should be made of materials that are flexible and breathable, such as leather or cloth. ¨ Break in new shoes slowly by wearing them for no more than an hour a day for several days. Take extra time to check your feet for red areas, blisters, or other problems after you wear new shoes. · Do not go barefoot. Do not wear sandals, and do not wear shoes with very thin soles. Thin soles are easy to puncture. They also do not protect your feet from hot pavement or cold weather. · Have your doctor check your feet during each visit. If you have a foot problem, see your doctor. Do not try to treat an early foot problem at home. Home remedies or treatments that you can buy without a prescription (such as corn removers) can be harmful. · Always get early treatment for foot problems. A minor irritation can lead to a major problem if not properly cared for early. When should you call for help? Call your doctor now or seek immediate medical care if: 
  · You have a foot sore, an ulcer or break in the skin that is not healing after 4 days, bleeding corns or calluses, or an ingrown toenail.  
  · You have blue or black areas, which can mean bruising or blood flow problems.  
  · You have peeling skin or tiny blisters between your toes or cracking or oozing of the skin.  
  · You have a fever for more than 24 hours and a foot sore.  
  · You have new numbness or tingling in your feet that does not go away after you move your feet or change positions.  
  · You have unexplained or unusual swelling of the foot or ankle.  
 Watch closely for changes in your health, and be sure to contact your doctor if: 
  · You cannot do proper foot care. Where can you learn more? Go to http://mukund-kun.info/. Enter A739 in the search box to learn more about \"Diabetes Foot Health: Care Instructions. \" Current as of: December 7, 2017 Content Version: 11.7 © 7045-4958 [employer], Generate.  Care instructions adapted under license by [employer] (which disclaims liability or warranty for this information). If you have questions about a medical condition or this instruction, always ask your healthcare professional. [facility] any warranty or liability for your use of this information. Introducing [facility]! [facility] introduces Avanzit patient portal. Now you can access parts of your medical record, email your doctor's office, and request medication refills online. 1. In your internet browser, go to [email or web address] 2. Click on the First Time User? Click Here link in the Sign In box. You will see the New Member Sign Up page. 3. Enter your Avanzit Access Code exactly as it appears below. You will not need to use this code after youve completed the sign-up process. If you do not sign up before the expiration date, you must request a new code. · Avanzit Access Code: [email or web address] Expires: 11/19/2018  1:03 PM 
 
4. Enter the last four digits of your Social Security Number (xxxx) and Date of Birth (mm/dd/yyyy) as indicated and click Submit. You will be taken to the next sign-up page. 5. Create a Avanzit ID. This will be your Avanzit login ID and cannot be changed, so think of one that is secure and easy to remember. 6. Create a Avanzit password. You can change your password at any time. 7. Enter your Password Reset Question and Answer. This can be used at a later time if you forget your password. 8. Enter your e-mail address. You will receive e-mail notification when new information is available in [address] 9. Click Sign Up. You can now view and download portions of your medical record. 10. Click the Download Summary menu link to download a portable copy of your medical information. If you have questions, please visit the Frequently Asked Questions section of the Avanzit website. Remember, Avanzit is NOT to be used for urgent needs. For medical emergencies, dial 911. Now available from your iPhone and Android! Please provide this summary of care documentation to your next provider. Your primary care clinician is listed as Vini Edwards. If you have any questions after today's visit, please call [phone].

## 2018-10-05 ENCOUNTER — HOSPITAL ENCOUNTER (OUTPATIENT)
Dept: LAB | Age: 83
Discharge: HOME | End: 2018-10-05
Payer: MEDICARE

## 2018-10-05 DIAGNOSIS — N18.30 CHRONIC KIDNEY DISEASE, STAGE III (MODERATE) (HCC): ICD-10-CM

## 2018-10-05 DIAGNOSIS — R80.9 PROTEINURIA: ICD-10-CM

## 2018-10-05 DIAGNOSIS — D64.9 ANEMIA, UNSPECIFIED: ICD-10-CM

## 2018-10-05 LAB
ALBUMIN SERPL-MCNC: 3.3 G/DL (ref 3.4–5)
ANION GAP SERPL CALC-SCNC: 10 MMOL/L (ref 3–18)
BUN SERPL-MCNC: 29 MG/DL (ref 7–18)
BUN/CREAT SERPL: 22 (ref 12–20)
CALCIUM SERPL-MCNC: 8.6 MG/DL (ref 8.5–10.1)
CALCIUM SERPL-MCNC: 8.6 MG/DL (ref 8.5–10.1)
CHLORIDE SERPL-SCNC: 108 MMOL/L (ref 100–108)
CO2 SERPL-SCNC: 22 MMOL/L (ref 21–32)
CREAT SERPL-MCNC: 1.34 MG/DL (ref 0.6–1.3)
CREAT UR-MCNC: 62.5 MG/DL (ref 30–125)
ERYTHROCYTE [DISTWIDTH] IN BLOOD BY AUTOMATED COUNT: 19.4 % (ref 11.6–14.5)
FERRITIN SERPL-MCNC: 356 NG/ML (ref 8–388)
GLUCOSE SERPL-MCNC: 151 MG/DL (ref 74–99)
HCT VFR BLD AUTO: 30.3 % (ref 35–45)
HGB BLD-MCNC: 9.7 G/DL (ref 12–16)
IRON SATN MFR SERPL: 18 %
IRON SERPL-MCNC: 40 UG/DL (ref 50–175)
MAGNESIUM SERPL-MCNC: 2 MG/DL (ref 1.6–2.6)
MCH RBC QN AUTO: 24.5 PG (ref 24–34)
MCHC RBC AUTO-ENTMCNC: 32 G/DL (ref 31–37)
MCV RBC AUTO: 76.5 FL (ref 74–97)
PHOSPHATE SERPL-MCNC: 3.7 MG/DL (ref 2.5–4.9)
PLATELET # BLD AUTO: 241 K/UL (ref 135–420)
POTASSIUM SERPL-SCNC: 4.1 MMOL/L (ref 3.5–5.5)
PROT UR-MCNC: 58 MG/DL
PTH-INTACT SERPL-MCNC: 162.5 PG/ML (ref 18.4–88)
RBC # BLD AUTO: 3.96 M/UL (ref 4.2–5.3)
SODIUM SERPL-SCNC: 140 MMOL/L (ref 136–145)
TIBC SERPL-MCNC: 221 UG/DL (ref 250–450)
WBC # BLD AUTO: 10.2 K/UL (ref 4.6–13.2)

## 2018-10-05 PROCEDURE — 84100 ASSAY OF PHOSPHORUS: CPT | Performed by: INTERNAL MEDICINE

## 2018-10-05 PROCEDURE — 36415 COLL VENOUS BLD VENIPUNCTURE: CPT | Performed by: INTERNAL MEDICINE

## 2018-10-05 PROCEDURE — 86335 IMMUNFIX E-PHORSIS/URINE/CSF: CPT | Performed by: INTERNAL MEDICINE

## 2018-10-05 PROCEDURE — 83970 ASSAY OF PARATHORMONE: CPT | Performed by: INTERNAL MEDICINE

## 2018-10-05 PROCEDURE — 82570 ASSAY OF URINE CREATININE: CPT | Performed by: INTERNAL MEDICINE

## 2018-10-05 PROCEDURE — 82040 ASSAY OF SERUM ALBUMIN: CPT | Performed by: INTERNAL MEDICINE

## 2018-10-05 PROCEDURE — 83735 ASSAY OF MAGNESIUM: CPT | Performed by: INTERNAL MEDICINE

## 2018-10-05 PROCEDURE — 82728 ASSAY OF FERRITIN: CPT | Performed by: INTERNAL MEDICINE

## 2018-10-05 PROCEDURE — 84156 ASSAY OF PROTEIN URINE: CPT | Performed by: INTERNAL MEDICINE

## 2018-10-05 PROCEDURE — 82784 ASSAY IGA/IGD/IGG/IGM EACH: CPT | Performed by: INTERNAL MEDICINE

## 2018-10-05 PROCEDURE — 85027 COMPLETE CBC AUTOMATED: CPT | Performed by: INTERNAL MEDICINE

## 2018-10-05 PROCEDURE — 83540 ASSAY OF IRON: CPT | Performed by: INTERNAL MEDICINE

## 2018-10-05 PROCEDURE — 80048 BASIC METABOLIC PNL TOTAL CA: CPT | Performed by: INTERNAL MEDICINE

## 2018-10-08 LAB
IGA SERPL-MCNC: 269 MG/DL (ref 64–422)
IGG SERPL-MCNC: 1128 MG/DL (ref 700–1600)
IGM SERPL-MCNC: 52 MG/DL (ref 26–217)
INTERPRETATION UR IFE-IMP: NORMAL
PROT PATTERN SERPL IFE-IMP: NORMAL

## 2018-10-15 RX ORDER — INSULIN HUMAN 100 [IU]/ML
INJECTION, SUSPENSION SUBCUTANEOUS
Qty: 20 ML | Refills: 0 | Status: SHIPPED | OUTPATIENT
Start: 2018-10-15 | End: 2018-12-11 | Stop reason: SDUPTHER

## 2018-12-11 RX ORDER — INSULIN HUMAN 100 [IU]/ML
INJECTION, SUSPENSION SUBCUTANEOUS
Qty: 20 ML | Refills: 0 | Status: SHIPPED | OUTPATIENT
Start: 2018-12-11 | End: 2018-12-21

## 2018-12-21 ENCOUNTER — OFFICE VISIT (OUTPATIENT)
Dept: INTERNAL MEDICINE CLINIC | Age: 83
End: 2018-12-21

## 2018-12-21 VITALS
BODY MASS INDEX: 26.86 KG/M2 | RESPIRATION RATE: 18 BRPM | HEIGHT: 65 IN | SYSTOLIC BLOOD PRESSURE: 157 MMHG | WEIGHT: 161.2 LBS | DIASTOLIC BLOOD PRESSURE: 49 MMHG | HEART RATE: 59 BPM | OXYGEN SATURATION: 98 % | TEMPERATURE: 96.9 F

## 2018-12-21 DIAGNOSIS — E78.00 PURE HYPERCHOLESTEROLEMIA: ICD-10-CM

## 2018-12-21 DIAGNOSIS — E11.21 TYPE 2 DIABETES MELLITUS WITH NEPHROPATHY (HCC): Primary | ICD-10-CM

## 2018-12-21 DIAGNOSIS — I10 ESSENTIAL HYPERTENSION: ICD-10-CM

## 2018-12-21 DIAGNOSIS — M25.572 ACUTE LEFT ANKLE PAIN: ICD-10-CM

## 2018-12-21 DIAGNOSIS — L30.9 DERMATITIS: ICD-10-CM

## 2018-12-21 LAB — HBA1C MFR BLD HPLC: 6.3 %

## 2018-12-21 NOTE — PROGRESS NOTES
HISTORY OF PRESENT ILLNESS  Huong Naranjo is a 80 y.o. female. 81 yo female here for f/u of DM, HTN. Continues to have issue with rash. Had some improvement with clotrimazole-betamethasone but now with increased rash B legs, abd. + itching. Also c/o L ankle pain for the past few weeks. Does not recall injury. Has some LE edema, feels it is increased now on L  DM: has been having some low glc readings with sx. Lowest in 40s. HTN: SBP remains a little increased. No CP, SOB. Seen by nephrology in OCT. No changes made. HLD: wishes to wait until next visit to repeat labs. Review of Systems   Constitutional: Negative for chills, fever and weight loss. HENT: Negative for congestion and ear pain. Eyes: Negative for blurred vision and pain. Respiratory: Negative for cough and shortness of breath. Cardiovascular: Positive for leg swelling. Negative for chest pain and palpitations. Gastrointestinal: Negative for nausea and vomiting. Genitourinary: Negative for frequency and urgency. Musculoskeletal: Positive for joint pain. Negative for myalgias. Skin: Negative for itching and rash. Neurological: Negative for dizziness, tingling and headaches. Psychiatric/Behavioral: Negative for depression. The patient is not nervous/anxious. Past Medical History:   Diagnosis Date    Diabetes ([facility]     Hypertension     Other and unspecified hyperlipidemia 2/16/2012     Current Outpatient Medications on File Prior to Visit   Medication Sig Dispense Refill    HUMULIN N NPH U-100 INSULIN 100 unit/mL injection INJECT 25 UNITS SUBCUTANEOUSLY IN THE MORNING AND 15 UNITS SUBCUTANEOUSLY IN THE EVENING 20 mL 0    ferrous sulfate 325 mg (65 mg iron) tablet TK 1 T PO D  12    hydrALAZINE (APRESOLINE) 50 mg tablet Take 1 Tab by mouth three (3) times daily.  90 Tab 4    amLODIPine (NORVASC) 10 mg tablet TAKE ONE TABLET BY MOUTH ONE TIME DAILY 90 Tab 4    Blood-Glucose Meter monitoring kit Use as directed to test blood glucose TID 1 Kit 0    glucose blood VI test strips (ASCENSIA AUTODISC VI, ONE TOUCH ULTRA TEST VI) strip Use as directed to test blood glucose  Strip 3    Lancets misc Use as directed to test blood glucose  Each 3    insulin lispro (HUMALOG) 100 unit/mL injection 5 units before the  breakfast  Indications: type 2 diabetes mellitus 1 Vial 5    simvastatin (ZOCOR) 20 mg tablet TAKE 1 TABLET BY MOUTH EVERY OTHER DAY 45 Tab 3    clotrimazole-betamethasone (LOTRISONE) topical cream Apply to affected area twice daily 45 g 1    atenolol (TENORMIN) 50 mg tablet Take 1 Tab by mouth two (2) times a day. 180 Tab 3    losartan-hydroCHLOROthiazide (HYZAAR) 100-25 mg per tablet Take 1 Tab by mouth daily. 90 Tab 5    betamethasone dipropionate (DIPROSONE) 0.05 % topical cream Apply  to affected area two (2) times a day. 45 g 1     No current facility-administered medications on file prior to visit. Physical Exam   Constitutional: She appears well-developed and well-nourished. No distress. /49 (BP 1 Location: Left arm, BP Patient Position: Sitting)   Pulse (!) 59   Temp 96.9 °F (36.1 °C) (Oral)   Resp 18   Ht 5' 5\" (1.651 m)   Wt 161 lb 3.2 oz (73.1 kg)   SpO2 98%   BMI 26.83 kg/m²      Eyes: EOM are normal. Right eye exhibits no discharge. Left eye exhibits no discharge. No scleral icterus. Neck: Neck supple. Cardiovascular: Normal rate, regular rhythm and normal heart sounds. Exam reveals no gallop and no friction rub. No murmur heard. Pulmonary/Chest: Effort normal and breath sounds normal. No respiratory distress. She has no wheezes. She has no rales. Musculoskeletal: She exhibits tenderness (L lateral ankle). She exhibits no edema. Left ankle: She exhibits swelling. She exhibits normal range of motion. Tenderness. Lateral malleolus tenderness found. Lymphadenopathy:     She has no cervical adenopathy. Neurological: She is alert.  She exhibits normal muscle tone. Skin: Skin is warm and dry. Psychiatric: She has a normal mood and affect. Diabetic Foot exam: 2+ dorsalis pedis pulses bilaterally. Positive monofilament in all 10 toes and bottoms of both feet. Negative for lesions, signs of infection, or foot abnormalities. Lab Results   Component Value Date/Time    Hemoglobin A1c 7.3 (H) 01/25/2018 01:39 PM    Hemoglobin A1c (POC) 7.7 11/18/2016 03:07 PM     Lab Results   Component Value Date/Time    Sodium 140 10/05/2018 09:27 AM    Potassium 4.1 10/05/2018 09:27 AM    Chloride 108 10/05/2018 09:27 AM    CO2 22 10/05/2018 09:27 AM    Anion gap 10 10/05/2018 09:27 AM    Glucose 151 (H) 10/05/2018 09:27 AM    BUN 29 (H) 10/05/2018 09:27 AM    Creatinine 1.34 (H) 10/05/2018 09:27 AM    BUN/Creatinine ratio 22 (H) 10/05/2018 09:27 AM    GFR est AA 46 (L) 10/05/2018 09:27 AM    GFR est non-AA 38 (L) 10/05/2018 09:27 AM    Calcium 8.6 10/05/2018 09:27 AM    Calcium 8.6 10/05/2018 09:27 AM    Bilirubin, total 0.8 01/25/2018 01:39 PM    AST (SGOT) 12 (L) 01/25/2018 01:39 PM    Alk. phosphatase 229 (H) 01/25/2018 01:39 PM    Protein, total 7.1 01/25/2018 01:39 PM    Albumin 3.3 (L) 10/05/2018 09:27 AM    Globulin 3.7 01/25/2018 01:39 PM    A-G Ratio 0.9 01/25/2018 01:39 PM    ALT (SGPT) 20 01/25/2018 01:39 PM     Lab Results   Component Value Date/Time    Microalbumin/Creat ratio (mg/g creat) 646 (H) 07/26/2017 12:33 PM    Microalbumin,urine random 31.10 (H) 07/26/2017 12:33 PM     Lab Results   Component Value Date/Time    Cholesterol, total 166 01/25/2018 01:39 PM    HDL Cholesterol 42 01/25/2018 01:39 PM    LDL, calculated 101.8 (H) 01/25/2018 01:39 PM    VLDL, calculated 22.2 01/25/2018 01:39 PM    Triglyceride 111 01/25/2018 01:39 PM    CHOL/HDL Ratio 4.0 01/25/2018 01:39 PM     ASSESSMENT and PLAN    ICD-10-CM ICD-9-CM    1. Type 2 diabetes mellitus with nephropathy (HCC) E11.21 250.40 AMB POC HEMOGLOBIN A1C     583.81    2.  Essential hypertension I10 401.9 3. Pure hypercholesterolemia E78.00 272.0    4. Acute left ankle pain M25.572 719.47 XR ANKLE LT MIN 3 V   5. Dermatitis L30.9 692.9 REFERRAL TO DERMATOLOGY   A1c decreased to 6.3%. Will decrease insulin to 22units in AM, 12 units in PM  Will continue with other medication  Xray requested given malleolar tenderness  Referral entered to dermatology.

## 2018-12-21 NOTE — PROGRESS NOTES
ROOM # [id number]  Identified pt with two pt identifiers(name and ). Reviewed record in preparation for visit and have obtained necessary documentation. Chief Complaint   Patient presents with    Hypertension    Diabetes      Melvi Brito preferred language for health care discussion is english/other. Is the patient using any DME equipment during OV? Gabriela Bowman is due for:  Health Maintenance Due   Topic    Shingrix Vaccine Age 50> (1 of 2)    HEMOGLOBIN A1C Q6M     Influenza Age 5 to Adult      Health Maintenance reviewed and discussed per provider  Please order/place referral if appropriate. Advance Directive:  1. Do you have an advance directive in place? Patient Reply: NO    2. If not, would you like material regarding how to put one in place? NO    Coordination of Care:  1. Have you been to the ER, urgent care clinic since your last visit? Hospitalized since your last visit? NO    2. Have you seen or consulted any other health care providers outside of the [address] [place] [address] since your last visit? Include any pap smears or colon screening. NO    Patient is accompanied by son I have received verbal consent from Melvi Brito to discuss any/all medical information while they are present in the room.     Learning Assessment:  Learning Assessment 2015   PRIMARY LEARNER Patient Patient   HIGHEST LEVEL OF EDUCATION - PRIMARY LEARNER  GRADUATED HIGH SCHOOL OR GED -   PRIMARY LANGUAGE ENGLISH ENGLISH   LEARNER PREFERENCE PRIMARY LISTENING READING     PICTURES -   ANSWERED BY patient patient   RELATIONSHIP SELF SELF     Depression Screening:  PHQ over the last two weeks 2018 2018 2018 10/26/2017 2016 2016 2016   Little interest or pleasure in doing things Not at all Not at all - Not at all Not at all Not at all Not at all   Feeling down, depressed, irritable, or hopeless Not at all Not at all Not at all Several days Not at all Not at all Not at all   Total Score PHQ 2 0 0 - 1 0 0 0     Abuse Screening:  Abuse Screening Questionnaire 4/25/2018 5/6/2015   Do you ever feel afraid of your partner? N N   Are you in a relationship with someone who physically or mentally threatens you? N N   Is it safe for you to go home? Y Y     Fall Risk  Fall Risk Assessment, last 12 mths 12/21/2018 8/21/2018 4/25/2018 10/26/2017 11/18/2016 8/17/2016 1/29/2016   Able to walk? Yes Yes Yes Yes Yes Yes Yes   Fall in past 12 months?  No No No No No No No

## 2019-01-09 ENCOUNTER — HOSPITAL ENCOUNTER (OUTPATIENT)
Dept: GENERAL RADIOLOGY | Age: 84
Discharge: HOME | End: 2019-01-09
Attending: INTERNAL MEDICINE
Payer: MEDICARE

## 2019-01-09 DIAGNOSIS — M25.572 ACUTE LEFT ANKLE PAIN: ICD-10-CM

## 2019-01-09 PROCEDURE — 73610 X-RAY EXAM OF ANKLE: CPT

## 2019-01-10 NOTE — PROGRESS NOTES
Please let her know her xray showed a small possible chronic vs acute fracture at the area she was having pain when seen 3 weeks ago. Is she still having symptoms? Would she be willing to see an orthopedic surgeon or sports medicine?

## 2019-02-01 RX ORDER — INSULIN HUMAN 100 [IU]/ML
INJECTION, SUSPENSION SUBCUTANEOUS
Qty: 20 ML | Refills: 0 | Status: SHIPPED | OUTPATIENT
Start: 2019-02-01 | End: 2019-04-07 | Stop reason: SDUPTHER

## 2019-02-01 RX ORDER — ATENOLOL 50 MG/1
TABLET ORAL
Qty: 180 TAB | Refills: 0 | Status: SHIPPED | OUTPATIENT
Start: 2019-02-01 | End: 2019-05-07 | Stop reason: SDUPTHER

## 2019-03-23 ENCOUNTER — OFFICE VISIT (OUTPATIENT)
Dept: INTERNAL MEDICINE CLINIC | Age: 84
End: 2019-03-23

## 2019-03-23 VITALS
BODY MASS INDEX: 25.83 KG/M2 | SYSTOLIC BLOOD PRESSURE: 180 MMHG | HEART RATE: 56 BPM | HEIGHT: 65 IN | DIASTOLIC BLOOD PRESSURE: 65 MMHG | OXYGEN SATURATION: 98 % | WEIGHT: 155 LBS | RESPIRATION RATE: 16 BRPM | TEMPERATURE: 96.9 F

## 2019-03-23 DIAGNOSIS — E11.21 TYPE 2 DIABETES MELLITUS WITH NEPHROPATHY (HCC): ICD-10-CM

## 2019-03-23 DIAGNOSIS — R10.13 EPIGASTRIC PAIN: ICD-10-CM

## 2019-03-23 DIAGNOSIS — I10 ESSENTIAL HYPERTENSION: Primary | ICD-10-CM

## 2019-03-23 DIAGNOSIS — R21 RASH: ICD-10-CM

## 2019-03-23 RX ORDER — PHENOL/SODIUM PHENOLATE
AEROSOL, SPRAY (ML) MUCOUS MEMBRANE
Qty: 88 TAB | Refills: 0 | Status: SHIPPED | OUTPATIENT
Start: 2019-03-23 | End: 2019-06-05

## 2019-03-23 RX ORDER — LOSARTAN POTASSIUM AND HYDROCHLOROTHIAZIDE 25; 100 MG/1; MG/1
1 TABLET ORAL DAILY
Qty: 90 TAB | Refills: 5 | Status: SHIPPED | OUTPATIENT
Start: 2019-03-23 | End: 2020-02-25

## 2019-03-23 RX ORDER — HYDRALAZINE HYDROCHLORIDE 50 MG/1
50 TABLET, FILM COATED ORAL 4 TIMES DAILY
Qty: 90 TAB | Refills: 4
Start: 2019-03-23 | End: 2019-06-26 | Stop reason: SDUPTHER

## 2019-03-23 RX ORDER — CHLORPHENIRAMINE MALEATE 4 MG
TABLET ORAL 2 TIMES DAILY
Qty: 15 G | Refills: 2 | Status: SHIPPED | OUTPATIENT
Start: 2019-03-23 | End: 2019-11-12 | Stop reason: SDUPTHER

## 2019-03-23 NOTE — PATIENT INSTRUCTIONS
Home Blood Pressure Test: About This Test 
What is it? A home blood pressure test allows you to keep track of your blood pressure at home. Blood pressure is a measure of the force of blood against the walls of your arteries. Blood pressure readings include two numbers, such as 130/80 (say \"130 over 80\"). The first number is the systolic pressure. The second number is the diastolic pressure. Why is this test done? You may do this test at home to: · Find out if you have high blood pressure. · Track your blood pressure if you have high blood pressure. · Track how well medicine is working to reduce high blood pressure. · Check how lifestyle changes, such as weight loss and exercise, are affecting blood pressure. How can you prepare for the test? 
· Do not use caffeine, tobacco, or medicines known to raise blood pressure (such as nasal decongestant sprays) for at least 30 minutes before taking your blood pressure. · Do not exercise for at least 30 minutes before taking your blood pressure. What happens before the test? 
Take your blood pressure while you feel comfortable and relaxed. Sit quietly with both feet on the floor for at least 5 minutes before the test. 
What happens during the test? 
· Sit with your arm slightly bent and resting on a table so that your upper arm is at the same level as your heart. · Roll up your sleeve or take off your shirt to expose your upper arm. · Wrap the blood pressure cuff around your upper arm so that the lower edge of the cuff is about 1 inch above the bend of your elbow. Proceed with the following steps depending on if you are using an automatic or manual pressure monitor. Automatic blood pressure monitors · Press the on/off button on the automatic monitor and wait until the ready-to-measure \"heart\" symbol appears next to zero in the display window. · Press the start button. The cuff will inflate and deflate by itself. · Your blood pressure numbers will appear on the screen. · Write your numbers in your log book, along with the date and time. Manual blood pressure monitors · Place the earpieces of a stethoscope in your ears, and place the bell of the stethoscope over the artery, just below the cuff. · Close the valve on the rubber inflating bulb. · Squeeze the bulb rapidly with your opposite hand to inflate the cuff until the dial or column of mercury reads about 30 mm Hg higher than your usual systolic pressure. If you do not know your usual pressure, inflate the cuff to 210 mm Hg or until the pulse at your wrist disappears. · Open the pressure valve just slightly by twisting or pressing the valve on the bulb. · As you watch the pressure slowly fall, note the level on the dial at which you first start to hear a pulsing or tapping sound through the stethoscope. This is your systolic blood pressure. · Continue letting the air out slowly. The sounds will become muffled and will finally disappear. Note the pressure when the sounds completely disappear. This is your diastolic blood pressure. Let out all the remaining air. · Write your numbers in your log book, along with the date and time. What else should you know about the test? 
It is more accurate to take the average of several readings made throughout the day than to rely on a single reading. It's normal for blood pressure to go up and down throughout the day. Follow-up care is a key part of your treatment and safety. Be sure to make and go to all appointments, and call your doctor if you are having problems. It's also a good idea to keep a list of the medicines you take. Where can you learn more? Go to http://mukund-kun.info/. Enter C427 in the search box to learn more about \"Home Blood Pressure Test: About This Test.\" Current as of: July 22, 2018 Content Version: 11.9 © 0524-6099 [employer].  Care instructions adapted under license by [address] (which disclaims liability or warranty for this information). If you have questions about a medical condition or this instruction, always ask your healthcare professional. [address] any warranty or liability for your use of this information.

## 2019-03-23 NOTE — PROGRESS NOTES
HISTORY OF PRESENT ILLNESS Brayden Cabral is a 80 y.o. female. 81 yo female here for f/u of HTN, DM, c/o epigastric pain. DM stable on home monitoring. Last A1c well controlled. Having upper abd discomfort intermittent past few months. Maalox helps some. Not needed every day. Pain is 'achy\". Some nausea, no vomiting. Was on omeprazole in past which helped. Appetite is poor recently HTN: BP elevated today. At home -150s CKD followed by nephrology. Has appt next month with repeat labs at that time Still with intermittent rash. Did not see derm. Review of Systems Constitutional: Negative for chills, fever and weight loss. HENT: Negative for congestion and ear pain. Eyes: Negative for blurred vision and pain. Respiratory: Negative for cough and shortness of breath. Cardiovascular: Negative for chest pain, palpitations and leg swelling. Gastrointestinal: Positive for abdominal pain and nausea. Negative for blood in stool, melena and vomiting. Genitourinary: Negative for frequency and urgency. Musculoskeletal: Negative for joint pain and myalgias. Skin: Negative for itching and rash. Neurological: Negative for dizziness, tingling and headaches. Psychiatric/Behavioral: Negative for depression. The patient is not nervous/anxious. Past Medical History:  
Diagnosis Date  Diabetes ([facility] [address]  Hypertension  Other and unspecified hyperlipidemia 2/16/2012 Current Outpatient Medications on File Prior to Visit Medication Sig Dispense Refill  atenolol (TENORMIN) 50 mg tablet TAKE ONE TABLET BY MOUTH TWICE DAILY 180 Tab 0  
 HUMULIN N NPH U-100 INSULIN 100 unit/mL injection INJECT 25 UNITS SUBCUTANEOUSLY IN THE MORNING AND 15 UNITS SUBCUTANEOUSLY IN THE EVENING (Patient taking differently: INJECT 22 UNITS SUBCUTANEOUSLY IN THE MORNING AND 12 UNITS SUBCUTANEOUSLY IN THE EVENING) 20 mL 0  
 insulin NPH (HUMULIN N NPH U-100 INSULIN) 100 unit/mL injection INJECT 22 UNITS SUBCUTANEOUSLY IN THE MORNING AND 12 UNITS SUBCUTANEOUSLY IN THE EVENING 20 mL 0  
 ferrous sulfate 325 mg (65 mg iron) tablet TK 1 T PO D  12  
 hydrALAZINE (APRESOLINE) 50 mg tablet Take 1 Tab by mouth three (3) times daily. 90 Tab 4  
 amLODIPine (NORVASC) 10 mg tablet TAKE ONE TABLET BY MOUTH ONE TIME DAILY 90 Tab 4  Blood-Glucose Meter monitoring kit Use as directed to test blood glucose TID 1 Kit 0  
 glucose blood VI test strips (ASCENSIA AUTODISC VI, ONE TOUCH ULTRA TEST VI) strip Use as directed to test blood glucose  Strip 3  Lancets misc Use as directed to test blood glucose  Each 3  
 insulin lispro (HUMALOG) 100 unit/mL injection 5 units before the  breakfast  Indications: type 2 diabetes mellitus 1 Vial 5  
 simvastatin (ZOCOR) 20 mg tablet TAKE 1 TABLET BY MOUTH EVERY OTHER DAY 45 Tab 3  clotrimazole-betamethasone (LOTRISONE) topical cream Apply to affected area twice daily 45 g 1  
 losartan-hydroCHLOROthiazide (HYZAAR) 100-25 mg per tablet Take 1 Tab by mouth daily. 90 Tab 5  
 betamethasone dipropionate (DIPROSONE) 0.05 % topical cream Apply  to affected area two (2) times a day. 45 g 1 No current facility-administered medications on file prior to visit. Physical Exam  
Constitutional: She appears well-developed and well-nourished. No distress. /65 (BP 1 Location: Left arm, BP Patient Position: Sitting)   Pulse (!) 56   Temp 96.9 °F (36.1 °C) (Oral)   Resp 16   Ht 5' 5\" (1.651 m)   Wt 155 lb (70.3 kg)   SpO2 98%   BMI 25.79 kg/m² Eyes: EOM are normal. Right eye exhibits no discharge. Left eye exhibits no discharge. No scleral icterus. Neck: Neck supple. Cardiovascular: Normal rate, regular rhythm and normal heart sounds. Exam reveals no gallop and no friction rub. No murmur heard. Pulmonary/Chest: Effort normal and breath sounds normal. No respiratory distress. She has no wheezes. She has no rales. Abdominal: Soft. She exhibits no distension. There is tenderness (mild epigastric). There is no rebound. Musculoskeletal: She exhibits no edema or tenderness. Lymphadenopathy:  
  She has no cervical adenopathy. Neurological: She is alert. She exhibits normal muscle tone. Skin: Skin is warm and dry. Psychiatric: She has a normal mood and affect. Lab Results Component Value Date/Time Sodium 140 10/05/2018 09:27 AM  
 Potassium 4.1 10/05/2018 09:27 AM  
 Chloride 108 10/05/2018 09:27 AM  
 CO2 22 10/05/2018 09:27 AM  
 Anion gap 10 10/05/2018 09:27 AM  
 Glucose 151 (H) 10/05/2018 09:27 AM  
 BUN 29 (H) 10/05/2018 09:27 AM  
 Creatinine 1.34 (H) 10/05/2018 09:27 AM  
 BUN/Creatinine ratio 22 (H) 10/05/2018 09:27 AM  
 GFR est AA 46 (L) 10/05/2018 09:27 AM  
 GFR est non-AA 38 (L) 10/05/2018 09:27 AM  
 Calcium 8.6 10/05/2018 09:27 AM  
 Calcium 8.6 10/05/2018 09:27 AM  
 Bilirubin, total 0.8 01/25/2018 01:39 PM  
 AST (SGOT) 12 (L) 01/25/2018 01:39 PM  
 Alk. phosphatase 229 (H) 01/25/2018 01:39 PM  
 Protein, total 7.1 01/25/2018 01:39 PM  
 Albumin 3.3 (L) 10/05/2018 09:27 AM  
 Globulin 3.7 01/25/2018 01:39 PM  
 A-G Ratio 0.9 01/25/2018 01:39 PM  
 ALT (SGPT) 20 01/25/2018 01:39 PM  
 
Lab Results Component Value Date/Time Hemoglobin A1c 7.3 (H) 01/25/2018 01:39 PM  
 Hemoglobin A1c (POC) 6.3 12/21/2018 01:08 PM  
 
ASSESSMENT and PLAN 
  ICD-10-CM ICD-9-CM 1. Essential hypertension I10 401.9 losartan-hydroCHLOROthiazide (HYZAAR) 100-25 mg per tablet 2. Type 2 diabetes mellitus with nephropathy (HCC) E11.21 250.40   
  583.81   
3. Epigastric pain R10.13 789.06   
4. Rash R21 782. 1 Will increase hydralazine to QID. F/u with nephrology as planned Will resume PPI. Can take omeprazole BID for 2 weeks then decrease to daily DM controlled at this time and no further hypoglycemia. No changes.

## 2019-03-26 ENCOUNTER — TELEPHONE (OUTPATIENT)
Dept: INTERNAL MEDICINE CLINIC | Age: 84
End: 2019-03-26

## 2019-03-26 ENCOUNTER — HOSPITAL ENCOUNTER (OUTPATIENT)
Dept: LAB | Age: 84
Discharge: HOME | End: 2019-03-26
Payer: MEDICARE

## 2019-03-26 DIAGNOSIS — R80.9 PROTEINURIA: ICD-10-CM

## 2019-03-26 DIAGNOSIS — D64.9 ANEMIA: ICD-10-CM

## 2019-03-26 DIAGNOSIS — N18.30 CHRONIC RENAL DISEASE, STAGE III (HCC): ICD-10-CM

## 2019-03-26 LAB
ALBUMIN SERPL-MCNC: 3.2 G/DL (ref 3.4–5)
ANION GAP SERPL CALC-SCNC: 9 MMOL/L (ref 3–18)
BASOPHILS # BLD: 0.1 K/UL (ref 0–0.1)
BASOPHILS NFR BLD: 1 % (ref 0–2)
BUN SERPL-MCNC: 27 MG/DL (ref 7–18)
BUN/CREAT SERPL: 17 (ref 12–20)
CALCIUM SERPL-MCNC: 8.6 MG/DL (ref 8.5–10.1)
CALCIUM SERPL-MCNC: 8.6 MG/DL (ref 8.5–10.1)
CHLORIDE SERPL-SCNC: 105 MMOL/L (ref 100–108)
CO2 SERPL-SCNC: 22 MMOL/L (ref 21–32)
CREAT SERPL-MCNC: 1.59 MG/DL (ref 0.6–1.3)
CREAT UR-MCNC: 83.6 MG/DL (ref 30–125)
DIFFERENTIAL METHOD BLD: ABNORMAL
EOSINOPHIL # BLD: 0.3 K/UL (ref 0–0.4)
EOSINOPHIL NFR BLD: 3 % (ref 0–5)
ERYTHROCYTE [DISTWIDTH] IN BLOOD BY AUTOMATED COUNT: 20.3 % (ref 11.6–14.5)
FERRITIN SERPL-MCNC: 310 NG/ML (ref 8–388)
GLUCOSE SERPL-MCNC: 193 MG/DL (ref 74–99)
HCT VFR BLD AUTO: 31.5 % (ref 35–45)
HGB BLD-MCNC: 10.2 G/DL (ref 12–16)
IRON SATN MFR SERPL: 21 %
IRON SERPL-MCNC: 48 UG/DL (ref 50–175)
LYMPHOCYTES # BLD: 1.9 K/UL (ref 0.9–3.6)
LYMPHOCYTES NFR BLD: 23 % (ref 21–52)
MAGNESIUM SERPL-MCNC: 1.6 MG/DL (ref 1.6–2.6)
MCH RBC QN AUTO: 24.5 PG (ref 24–34)
MCHC RBC AUTO-ENTMCNC: 32.4 G/DL (ref 31–37)
MCV RBC AUTO: 75.5 FL (ref 74–97)
MONOCYTES # BLD: 0.7 K/UL (ref 0.05–1.2)
MONOCYTES NFR BLD: 8 % (ref 3–10)
NEUTS SEG # BLD: 5.4 K/UL (ref 1.8–8)
NEUTS SEG NFR BLD: 65 % (ref 40–73)
PHOSPHATE SERPL-MCNC: 3.2 MG/DL (ref 2.5–4.9)
PLATELET # BLD AUTO: 230 K/UL (ref 135–420)
POTASSIUM SERPL-SCNC: 3.9 MMOL/L (ref 3.5–5.5)
PROT UR-MCNC: 95 MG/DL
PTH-INTACT SERPL-MCNC: 125.6 PG/ML (ref 18.4–88)
RBC # BLD AUTO: 4.17 M/UL (ref 4.2–5.3)
SODIUM SERPL-SCNC: 136 MMOL/L (ref 136–145)
TIBC SERPL-MCNC: 224 UG/DL (ref 250–450)
WBC # BLD AUTO: 8.3 K/UL (ref 4.6–13.2)

## 2019-03-26 PROCEDURE — 85025 COMPLETE CBC W/AUTO DIFF WBC: CPT

## 2019-03-26 PROCEDURE — 83735 ASSAY OF MAGNESIUM: CPT

## 2019-03-26 PROCEDURE — 83970 ASSAY OF PARATHORMONE: CPT

## 2019-03-26 PROCEDURE — 84100 ASSAY OF PHOSPHORUS: CPT

## 2019-03-26 PROCEDURE — 82570 ASSAY OF URINE CREATININE: CPT

## 2019-03-26 PROCEDURE — 84156 ASSAY OF PROTEIN URINE: CPT

## 2019-03-26 PROCEDURE — 82728 ASSAY OF FERRITIN: CPT

## 2019-03-26 PROCEDURE — 82040 ASSAY OF SERUM ALBUMIN: CPT

## 2019-03-26 PROCEDURE — 36415 COLL VENOUS BLD VENIPUNCTURE: CPT

## 2019-03-26 PROCEDURE — 80048 BASIC METABOLIC PNL TOTAL CA: CPT

## 2019-03-26 PROCEDURE — 83540 ASSAY OF IRON: CPT

## 2019-03-26 NOTE — TELEPHONE ENCOUNTER
PA request received from pharmacy for Omeprazole 20mg caps, Plan does not cover this medication. Fax scanned to chart.

## 2019-03-27 NOTE — TELEPHONE ENCOUNTER
This medication may be excluded from the patient's benefit. For more information, please reach out to [employer] directly at [phone]. Message from [employer]: Drug is not covered by plan    [facility] contacted directly. Was informed that 30 tablets for 30 days is approved but pt is ordered to take 2 per day for 14 days so she requires a PA. PA completed and approved. Reference number [id number]  Approved from 02/25/2019-03/26/2022.     Pharm contacted and informed of approval

## 2019-05-07 RX ORDER — ATENOLOL 50 MG/1
TABLET ORAL
Qty: 180 TAB | Refills: 0 | Status: SHIPPED | OUTPATIENT
Start: 2019-05-07 | End: 2019-08-05 | Stop reason: SDUPTHER

## 2019-05-08 ENCOUNTER — HOSPITAL ENCOUNTER (OUTPATIENT)
Dept: LAB | Age: 84
Discharge: HOME | End: 2019-05-08
Payer: MEDICARE

## 2019-05-08 DIAGNOSIS — N18.30 CHRONIC KIDNEY DISEASE, STAGE III (MODERATE) (HCC): ICD-10-CM

## 2019-05-08 LAB
ANION GAP SERPL CALC-SCNC: 7 MMOL/L (ref 3–18)
BUN SERPL-MCNC: 25 MG/DL (ref 7–18)
BUN/CREAT SERPL: 17 (ref 12–20)
CALCIUM SERPL-MCNC: 8.8 MG/DL (ref 8.5–10.1)
CHLORIDE SERPL-SCNC: 107 MMOL/L (ref 100–108)
CO2 SERPL-SCNC: 24 MMOL/L (ref 21–32)
CREAT SERPL-MCNC: 1.44 MG/DL (ref 0.6–1.3)
GLUCOSE SERPL-MCNC: 103 MG/DL (ref 74–99)
POTASSIUM SERPL-SCNC: 4.3 MMOL/L (ref 3.5–5.5)
SODIUM SERPL-SCNC: 138 MMOL/L (ref 136–145)

## 2019-05-08 PROCEDURE — 80048 BASIC METABOLIC PNL TOTAL CA: CPT

## 2019-05-08 PROCEDURE — 36415 COLL VENOUS BLD VENIPUNCTURE: CPT

## 2019-06-26 ENCOUNTER — TELEPHONE (OUTPATIENT)
Dept: INTERNAL MEDICINE CLINIC | Age: 84
End: 2019-06-26

## 2019-06-26 ENCOUNTER — HOSPITAL ENCOUNTER (OUTPATIENT)
Dept: LAB | Age: 84
Discharge: HOME | End: 2019-06-26
Payer: MEDICARE

## 2019-06-26 ENCOUNTER — OFFICE VISIT (OUTPATIENT)
Dept: INTERNAL MEDICINE CLINIC | Age: 84
End: 2019-06-26

## 2019-06-26 VITALS
DIASTOLIC BLOOD PRESSURE: 55 MMHG | HEIGHT: 65 IN | BODY MASS INDEX: 25.49 KG/M2 | OXYGEN SATURATION: 95 % | HEART RATE: 48 BPM | SYSTOLIC BLOOD PRESSURE: 146 MMHG | WEIGHT: 153 LBS | TEMPERATURE: 96.7 F | RESPIRATION RATE: 18 BRPM

## 2019-06-26 DIAGNOSIS — L30.9 DERMATITIS: ICD-10-CM

## 2019-06-26 DIAGNOSIS — I10 ESSENTIAL HYPERTENSION: Primary | ICD-10-CM

## 2019-06-26 DIAGNOSIS — R10.10 UPPER ABDOMINAL PAIN: ICD-10-CM

## 2019-06-26 DIAGNOSIS — E11.21 TYPE 2 DIABETES MELLITUS WITH NEPHROPATHY (HCC): ICD-10-CM

## 2019-06-26 DIAGNOSIS — I10 ESSENTIAL HYPERTENSION: ICD-10-CM

## 2019-06-26 DIAGNOSIS — R19.7 DIARRHEA, UNSPECIFIED TYPE: ICD-10-CM

## 2019-06-26 LAB
ALBUMIN SERPL-MCNC: 3.6 G/DL (ref 3.4–5)
ALBUMIN/GLOB SERPL: 1.1 {RATIO} (ref 0.8–1.7)
ALP SERPL-CCNC: 203 U/L (ref 45–117)
ALT SERPL-CCNC: 27 U/L (ref 13–56)
ANION GAP SERPL CALC-SCNC: 9 MMOL/L (ref 3–18)
AST SERPL-CCNC: 15 U/L (ref 15–37)
BASOPHILS # BLD: 0 K/UL (ref 0–0.1)
BASOPHILS NFR BLD: 0 % (ref 0–2)
BILIRUB SERPL-MCNC: 0.6 MG/DL (ref 0.2–1)
BUN SERPL-MCNC: 31 MG/DL (ref 7–18)
BUN/CREAT SERPL: 19 (ref 12–20)
CALCIUM SERPL-MCNC: 9 MG/DL (ref 8.5–10.1)
CHLORIDE SERPL-SCNC: 106 MMOL/L (ref 100–108)
CHOLEST SERPL-MCNC: 146 MG/DL
CO2 SERPL-SCNC: 26 MMOL/L (ref 21–32)
CREAT SERPL-MCNC: 1.67 MG/DL (ref 0.6–1.3)
DIFFERENTIAL METHOD BLD: ABNORMAL
EOSINOPHIL # BLD: 0.2 K/UL (ref 0–0.4)
EOSINOPHIL NFR BLD: 3 % (ref 0–5)
ERYTHROCYTE [DISTWIDTH] IN BLOOD BY AUTOMATED COUNT: 19.7 % (ref 11.6–14.5)
EST. AVERAGE GLUCOSE BLD GHB EST-MCNC: 128 MG/DL
GLOBULIN SER CALC-MCNC: 3.3 G/DL (ref 2–4)
GLUCOSE SERPL-MCNC: 107 MG/DL (ref 74–99)
HBA1C MFR BLD: 6.1 % (ref 4.2–5.6)
HCT VFR BLD AUTO: 31.3 % (ref 35–45)
HDLC SERPL-MCNC: 49 MG/DL (ref 40–60)
HDLC SERPL: 3 {RATIO} (ref 0–5)
HGB BLD-MCNC: 10 G/DL (ref 12–16)
LDLC SERPL CALC-MCNC: 80.8 MG/DL (ref 0–100)
LIPID PROFILE,FLP: NORMAL
LYMPHOCYTES # BLD: 1.7 K/UL (ref 0.9–3.6)
LYMPHOCYTES NFR BLD: 20 % (ref 21–52)
MCH RBC QN AUTO: 23.9 PG (ref 24–34)
MCHC RBC AUTO-ENTMCNC: 31.9 G/DL (ref 31–37)
MCV RBC AUTO: 74.9 FL (ref 74–97)
MONOCYTES # BLD: 0.6 K/UL (ref 0.05–1.2)
MONOCYTES NFR BLD: 8 % (ref 3–10)
NEUTS SEG # BLD: 5.8 K/UL (ref 1.8–8)
NEUTS SEG NFR BLD: 69 % (ref 40–73)
PLATELET # BLD AUTO: 237 K/UL (ref 135–420)
POTASSIUM SERPL-SCNC: 4.7 MMOL/L (ref 3.5–5.5)
PROT SERPL-MCNC: 6.9 G/DL (ref 6.4–8.2)
RBC # BLD AUTO: 4.18 M/UL (ref 4.2–5.3)
SODIUM SERPL-SCNC: 141 MMOL/L (ref 136–145)
TRIGL SERPL-MCNC: 81 MG/DL (ref ?–150)
TSH SERPL DL<=0.05 MIU/L-ACNC: 1.2 UIU/ML (ref 0.36–3.74)
VLDLC SERPL CALC-MCNC: 16.2 MG/DL
WBC # BLD AUTO: 8.3 K/UL (ref 4.6–13.2)

## 2019-06-26 PROCEDURE — 80061 LIPID PANEL: CPT

## 2019-06-26 PROCEDURE — 83036 HEMOGLOBIN GLYCOSYLATED A1C: CPT

## 2019-06-26 PROCEDURE — 80053 COMPREHEN METABOLIC PANEL: CPT

## 2019-06-26 PROCEDURE — 84443 ASSAY THYROID STIM HORMONE: CPT

## 2019-06-26 PROCEDURE — 85025 COMPLETE CBC W/AUTO DIFF WBC: CPT

## 2019-06-26 RX ORDER — HYDRALAZINE HYDROCHLORIDE 50 MG/1
50 TABLET, FILM COATED ORAL 4 TIMES DAILY
Qty: 90 TAB | Refills: 4 | Status: SHIPPED | OUTPATIENT
Start: 2019-06-26 | End: 2019-06-26 | Stop reason: DRUGHIGH

## 2019-06-26 RX ORDER — INSULIN LISPRO 100 [IU]/ML
INJECTION, SOLUTION INTRAVENOUS; SUBCUTANEOUS
Qty: 1 VIAL | Refills: 5
Start: 2019-06-26 | End: 2019-10-06 | Stop reason: SDUPTHER

## 2019-06-26 RX ORDER — CLOTRIMAZOLE AND BETAMETHASONE DIPROPIONATE 10; .64 MG/G; MG/G
CREAM TOPICAL
Qty: 15 G | Refills: 0 | Status: SHIPPED | OUTPATIENT
Start: 2019-06-26

## 2019-06-26 RX ORDER — HYDRALAZINE HYDROCHLORIDE 100 MG/1
100 TABLET, FILM COATED ORAL 3 TIMES DAILY
Qty: 180 TAB | Refills: 5 | Status: SHIPPED | OUTPATIENT
Start: 2019-06-26 | End: 2019-06-26 | Stop reason: SDUPTHER

## 2019-06-26 NOTE — PROGRESS NOTES
Rm: 16    Chief Complaint   Patient presents with    Hypertension    Diabetes    Diarrhea     Depression Screening:  3 most recent [facility] Screens 6/26/2019 3/23/2019 12/21/2018 8/21/2018 4/25/2018 10/26/2017 11/18/2016   Little interest or pleasure in doing things Not at all Not at all Not at all Not at all - Not at all Not at all   Feeling down, depressed, irritable, or hopeless Not at all Not at all Not at all Not at all Not at all Several days Not at all   Total Score PHQ 2 0 0 0 0 - 1 0       Learning Assessment:  Learning Assessment 5/6/2015 8/28/2013   PRIMARY LEARNER Patient Patient   HIGHEST LEVEL OF EDUCATION - PRIMARY LEARNER  [address] -   ANSWERED BY patient patient   RELATIONSHIP SELF SELF       Abuse Screening:  Abuse Screening Questionnaire 3/23/2019 4/25/2018 5/6/2015   Do you ever feel afraid of your partner? N N N   Are you in a relationship with someone who physically or mentally threatens you? N N N   Is it safe for you to go home? Steve Ghotra       Health Maintenance reviewed and discussed per provider: yes     Coordination of Care:    1. Have you been to the ER, urgent care clinic since your last visit? Hospitalized since your last visit? no    2. Have you seen or consulted any other health care providers outside of the [address] [place] [address] since your last visit? Include any pap smears or colon screening.  no

## 2019-06-26 NOTE — TELEPHONE ENCOUNTER
Patient call to inform the office Rx Clotrimazole not covered by patient plan please send alternative I inform patient to call her insurance to find out what's covered

## 2019-06-26 NOTE — PATIENT INSTRUCTIONS

## 2019-06-26 NOTE — PROGRESS NOTES
HISTORY OF PRESENT ILLNESS  Madi Catherine is a 80 y.o. female. Here for f/u of HTN, DM  HTN: BP improved from last visit. Reports hydralazine dose had been increased by her nephrologist to 100mg TID  DM doing well on home monitoring, ranging   Continues to have intermittent upper abd aching. Had resumed PPI but stopped by nephrology. Has been taking pepcid, but son reports not as regular as she should  Also with intermittent diarrhea, trouble making to bathroom at times. Not daily issue. Will last a few days then resolve. Continued rash BLE. Waxes and wanes. Not resolving with tx    Hypertension    Pertinent negatives include no chest pain, no palpitations, no blurred vision, no headaches, no dizziness, no shortness of breath, no nausea and no vomiting. Diabetes   Associated symptoms include abdominal pain. Pertinent negatives include no chest pain, no headaches and no shortness of breath. Diarrhea    Associated symptoms include abdominal pain. Pertinent negatives include no vomiting, no chills, no headaches, no myalgias and no cough. Review of Systems   Constitutional: Negative for chills, fever and weight loss. HENT: Negative for congestion and ear pain. Eyes: Negative for blurred vision and pain. Respiratory: Negative for cough and shortness of breath. Cardiovascular: Negative for chest pain, palpitations and leg swelling. Gastrointestinal: Positive for abdominal pain and diarrhea. Negative for nausea and vomiting. Genitourinary: Negative for frequency and urgency. Musculoskeletal: Negative for joint pain and myalgias. Skin: Positive for itching and rash. Neurological: Negative for dizziness, tingling and headaches. Psychiatric/Behavioral: Negative for depression. The patient is not nervous/anxious.       Past Medical History:   Diagnosis Date    Diabetes ([address]     Hypertension     Other and unspecified hyperlipidemia 2/16/2012     Current Outpatient Medications on File Prior to Visit   Medication Sig Dispense Refill    insulin NPH (HUMULIN N NPH U-100 INSULIN) 100 unit/mL injection INJECT 22 UNITS UNDER THE SKIN EVERY MORNING THEN 12 UNITS UNDER THE SKIN EVERY EVENING 20 mL 0    atenolol (TENORMIN) 50 mg tablet TAKE ONE TABLET BY MOUTH TWICE DAILY 180 Tab 0    hydrALAZINE (APRESOLINE) 50 mg tablet Take 1 Tab by mouth four (4) times daily. Indications: high blood pressure 90 Tab 4    losartan-hydroCHLOROthiazide (HYZAAR) 100-25 mg per tablet Take 1 Tab by mouth daily. 90 Tab 5    clotrimazole (LOTRIMIN) 1 % topical cream Apply  to affected area two (2) times a day. 15 g 2    insulin NPH (HUMULIN N NPH U-100 INSULIN) 100 unit/mL injection INJECT 22 UNITS SUBCUTANEOUSLY IN THE MORNING AND 12 UNITS SUBCUTANEOUSLY IN THE EVENING 20 mL 0    ferrous sulfate 325 mg (65 mg iron) tablet TK 1 T PO D  12    amLODIPine (NORVASC) 10 mg tablet TAKE ONE TABLET BY MOUTH ONE TIME DAILY 90 Tab 4    glucose blood VI test strips (ASCENSIA AUTODISC VI, ONE TOUCH ULTRA TEST VI) strip Use as directed to test blood glucose  Strip 3    Lancets misc Use as directed to test blood glucose  Each 3    insulin lispro (HUMALOG) 100 unit/mL injection 5 units before the  breakfast  Indications: type 2 diabetes mellitus 1 Vial 5    simvastatin (ZOCOR) 20 mg tablet TAKE 1 TABLET BY MOUTH EVERY OTHER DAY 45 Tab 3    Blood-Glucose Meter monitoring kit Use as directed to test blood glucose TID 1 Kit 0    betamethasone dipropionate (DIPROSONE) 0.05 % topical cream Apply  to affected area two (2) times a day. 45 g 1     No current facility-administered medications on file prior to visit.       Allergies   Allergen Reactions    Aspirin Nausea Only    Bactrim [Sulfamethoprim] Nausea Only     And dizziness and loss of appetite    Clindamycin Other (comments)     Tongue pain and metallic taste    Pcn [Penicillins] Nausea and Vomiting and Hives    Sulfamethoxazole-Trimethoprim Itching    Tetanus Toxoid,Fluid Hives    Tetracycline Nausea Only    Tetracyclines Other (comments)     Upset stomach    Tramadol Other (comments)     Nausea and vomiting     Physical Exam   Constitutional: She appears well-developed and well-nourished. No distress. /55 (BP 1 Location: Left arm, BP Patient Position: Sitting)   Pulse (!) 48   Temp 96.7 °F (35.9 °C) (Oral)   Resp 18   Ht 5' 5\" (1.651 m)   Wt 153 lb (69.4 kg)   SpO2 95%   BMI 25.46 kg/m²      Eyes: EOM are normal. Right eye exhibits no discharge. Left eye exhibits no discharge. No scleral icterus. Neck: Neck supple. Cardiovascular: Normal rate, regular rhythm and normal heart sounds. Exam reveals no gallop and no friction rub. No murmur heard. Pulmonary/Chest: Effort normal and breath sounds normal. No respiratory distress. She has no wheezes. She has no rales. Abdominal: Soft. She exhibits no distension. There is no tenderness. There is no rebound and no guarding. Musculoskeletal: She exhibits no edema or tenderness. Lymphadenopathy:     She has no cervical adenopathy. Neurological: She is alert. She exhibits normal muscle tone. Skin: Skin is warm and dry. Rash (Ble) noted. Psychiatric: She has a normal mood and affect. Lab Results   Component Value Date/Time    Sodium 138 05/08/2019 10:02 AM    Potassium 4.3 05/08/2019 10:02 AM    Chloride 107 05/08/2019 10:02 AM    CO2 24 05/08/2019 10:02 AM    Anion gap 7 05/08/2019 10:02 AM    Glucose 103 (H) 05/08/2019 10:02 AM    BUN 25 (H) 05/08/2019 10:02 AM    Creatinine 1.44 (H) 05/08/2019 10:02 AM    BUN/Creatinine ratio 17 05/08/2019 10:02 AM    GFR est AA 42 (L) 05/08/2019 10:02 AM    GFR est non-AA 35 (L) 05/08/2019 10:02 AM    Calcium 8.8 05/08/2019 10:02 AM    Bilirubin, total 0.8 01/25/2018 01:39 PM    AST (SGOT) 12 (L) 01/25/2018 01:39 PM    Alk.  phosphatase 229 (H) 01/25/2018 01:39 PM    Protein, total 7.1 01/25/2018 01:39 PM    Albumin 3.2 (L) 03/26/2019 11:41 AM Globulin 3.7 01/25/2018 01:39 PM    A-G Ratio 0.9 01/25/2018 01:39 PM    ALT (SGPT) 20 01/25/2018 01:39 PM     Lab Results   Component Value Date/Time    Hemoglobin A1c 7.3 (H) 01/25/2018 01:39 PM    Hemoglobin A1c (POC) 6.3 12/21/2018 01:08 PM     ASSESSMENT and PLAN    ICD-10-CM ICD-9-CM    1. Essential hypertension I10 401.9 CBC WITH AUTOMATED DIFF      METABOLIC PANEL, COMPREHENSIVE      TSH 3RD GENERATION   2. Type 2 diabetes mellitus with nephropathy (HCC) E11.21 250.40 LIPID PANEL     583.81 HEMOGLOBIN A1C WITH EAG   3. Diarrhea, unspecified type R19.7 787.91 REFERRAL TO GASTROENTEROLOGY   4. Upper abdominal pain R10.10 789.09 REFERRAL TO GASTROENTEROLOGY   5. Dermatitis L30.9 692.9 REFERRAL TO DERMATOLOGY     Will continue hydralazine - changed to 100 mg tabs to decrease pill burden  Repeat labs today.   Referrals entered to GI, dermatology  Discussed trying metamucil to add bulk to stool

## 2019-06-27 RX ORDER — INSULIN LISPRO 100 [IU]/ML
INJECTION, SOLUTION INTRAVENOUS; SUBCUTANEOUS
Qty: 10 ML | Refills: 0 | Status: SHIPPED | OUTPATIENT
Start: 2019-06-27 | End: 2019-10-06 | Stop reason: SDUPTHER

## 2019-07-08 NOTE — PROGRESS NOTES
Please let her know her recent labs are relatively stable. Her kidney function shows a little decline. She should follow up with her nephrologist as planned. Her alk phosphatase remains a little elevated but is improved from past labs.

## 2019-08-06 RX ORDER — ATENOLOL 50 MG/1
TABLET ORAL
Qty: 180 TAB | Refills: 0 | Status: SHIPPED | OUTPATIENT
Start: 2019-08-06 | End: 2019-11-12 | Stop reason: SDUPTHER

## 2019-08-19 ENCOUNTER — HOSPITAL ENCOUNTER (OUTPATIENT)
Dept: ULTRASOUND IMAGING | Age: 84
Discharge: HOME | End: 2019-08-19
Attending: INTERNAL MEDICINE
Payer: MEDICARE

## 2019-08-19 DIAGNOSIS — E11.9 DIABETES MELLITUS (HCC): ICD-10-CM

## 2019-08-19 DIAGNOSIS — R10.13 ABDOMINAL PAIN, EPIGASTRIC: ICD-10-CM

## 2019-08-19 DIAGNOSIS — R19.7 DIARRHEA: ICD-10-CM

## 2019-08-19 DIAGNOSIS — K21.9 GERD (GASTROESOPHAGEAL REFLUX DISEASE): ICD-10-CM

## 2019-08-19 DIAGNOSIS — I10 HYPERTENSION: ICD-10-CM

## 2019-08-19 DIAGNOSIS — Z12.11 COLON CANCER SCREENING: ICD-10-CM

## 2019-08-19 PROCEDURE — 76700 US EXAM ABDOM COMPLETE: CPT

## 2019-09-03 RX ORDER — AMLODIPINE BESYLATE 10 MG/1
TABLET ORAL
Qty: 90 TAB | Refills: 0 | Status: SHIPPED | OUTPATIENT
Start: 2019-09-03 | End: 2019-11-26 | Stop reason: SDUPTHER

## 2019-09-27 ENCOUNTER — HOSPITAL ENCOUNTER (OUTPATIENT)
Dept: LAB | Age: 84
Discharge: HOME | End: 2019-09-27
Payer: MEDICARE

## 2019-09-27 DIAGNOSIS — N18.30 CHRONIC KIDNEY DISEASE, STAGE III (MODERATE) (HCC): ICD-10-CM

## 2019-09-27 DIAGNOSIS — R80.9 PROTEINURIA: ICD-10-CM

## 2019-09-27 DIAGNOSIS — E66.8 OBESITY OF ENDOCRINE ORIGIN: ICD-10-CM

## 2019-09-27 LAB
ALBUMIN SERPL-MCNC: 3.4 G/DL (ref 3.4–5)
ANION GAP SERPL CALC-SCNC: 8 MMOL/L (ref 3–18)
BUN SERPL-MCNC: 28 MG/DL (ref 7–18)
BUN/CREAT SERPL: 17 (ref 12–20)
CALCIUM SERPL-MCNC: 8.9 MG/DL (ref 8.5–10.1)
CALCIUM SERPL-MCNC: 9.7 MG/DL (ref 8.5–10.1)
CHLORIDE SERPL-SCNC: 107 MMOL/L (ref 100–111)
CO2 SERPL-SCNC: 24 MMOL/L (ref 21–32)
CREAT SERPL-MCNC: 1.66 MG/DL (ref 0.6–1.3)
CREAT UR-MCNC: 48.6 MG/DL (ref 30–125)
ERYTHROCYTE [DISTWIDTH] IN BLOOD BY AUTOMATED COUNT: 19.8 % (ref 11.6–14.5)
GLUCOSE SERPL-MCNC: 113 MG/DL (ref 74–99)
HCT VFR BLD AUTO: 31.3 % (ref 35–45)
HGB BLD-MCNC: 10.1 G/DL (ref 12–16)
MAGNESIUM SERPL-MCNC: 1.9 MG/DL (ref 1.6–2.6)
MCH RBC QN AUTO: 24.6 PG (ref 24–34)
MCHC RBC AUTO-ENTMCNC: 32.3 G/DL (ref 31–37)
MCV RBC AUTO: 76.2 FL (ref 74–97)
PHOSPHATE SERPL-MCNC: 3.8 MG/DL (ref 2.5–4.9)
PLATELET # BLD AUTO: 240 K/UL (ref 135–420)
POTASSIUM SERPL-SCNC: 4.5 MMOL/L (ref 3.5–5.5)
PROT UR-MCNC: 57 MG/DL
PTH-INTACT SERPL-MCNC: 174.3 PG/ML (ref 18.4–88)
RBC # BLD AUTO: 4.11 M/UL (ref 4.2–5.3)
SODIUM SERPL-SCNC: 139 MMOL/L (ref 136–145)
WBC # BLD AUTO: 7.3 K/UL (ref 4.6–13.2)

## 2019-09-27 PROCEDURE — 84156 ASSAY OF PROTEIN URINE: CPT

## 2019-09-27 PROCEDURE — 80048 BASIC METABOLIC PNL TOTAL CA: CPT

## 2019-09-27 PROCEDURE — 85027 COMPLETE CBC AUTOMATED: CPT

## 2019-09-27 PROCEDURE — 84100 ASSAY OF PHOSPHORUS: CPT

## 2019-09-27 PROCEDURE — 83735 ASSAY OF MAGNESIUM: CPT

## 2019-09-27 PROCEDURE — 36415 COLL VENOUS BLD VENIPUNCTURE: CPT

## 2019-09-27 PROCEDURE — 83970 ASSAY OF PARATHORMONE: CPT

## 2019-09-27 PROCEDURE — 82570 ASSAY OF URINE CREATININE: CPT

## 2019-09-27 PROCEDURE — 82040 ASSAY OF SERUM ALBUMIN: CPT

## 2019-10-06 RX ORDER — INSULIN LISPRO 100 [IU]/ML
INJECTION, SOLUTION INTRAVENOUS; SUBCUTANEOUS
Qty: 10 ML | Refills: 2 | Status: SHIPPED | OUTPATIENT
Start: 2019-10-06 | End: 2020-02-25

## 2019-10-06 NOTE — TELEPHONE ENCOUNTER
Sent electronically:  Requested Prescriptions     Signed Prescriptions Disp Refills    insulin NPH (HUMULIN N NPH U-100 INSULIN) 100 unit/mL injection 20 mL 2     Sig: ADMINISTER 22 UNITS UNDER THE SKIN EVERY MORNING THEN ADMINISTER 12 UNITS UNDER THE SKIN EVERY EVENING     Authorizing Provider: JOSEMANUEL SANCHEZ    insulin lispro (HUMALOG U-100 INSULIN) 100 unit/mL injection 10 mL 2     Sig: INJECT 5 UNITS UNDER THE SKIN BEFORE BREAKFAST. DISCARD AFTER 28 DAYS OF OPENING     Authorizing Provider: Cathie Lucio needs to get a new PCP, former Dr. Ivan Shabazz pt.

## 2019-11-12 RX ORDER — ATENOLOL 50 MG/1
TABLET ORAL
Qty: 180 TAB | Refills: 1 | Status: SHIPPED | OUTPATIENT
Start: 2019-11-12

## 2019-11-12 RX ORDER — CHLORPHENIRAMINE MALEATE 4 MG
TABLET ORAL 2 TIMES DAILY
Qty: 15 G | Refills: 2 | Status: SHIPPED | OUTPATIENT
Start: 2019-11-12

## 2019-11-12 NOTE — TELEPHONE ENCOUNTER
Pharmacy fax request for medication. Spoke to the patient's son who is her POA to ask about a new PCP. States they were going to see Dr. Graeme Gabriel. Informed them Dr. Graeme Gabriel would not be returning to the practice so he would need to find a new PCP for his mother. Advised I would put the refill requests in. Requested Prescriptions     Pending Prescriptions Disp Refills    atenolol (TENORMIN) 50 mg tablet 180 Tab 0    clotrimazole (LOTRIMIN) 1 % topical cream 15 g 2     Sig: Apply  to affected area two (2) times a day.

## 2019-11-26 RX ORDER — AMLODIPINE BESYLATE 10 MG/1
TABLET ORAL
Qty: 90 TAB | Refills: 0 | Status: SHIPPED | OUTPATIENT
Start: 2019-11-26 | End: 2020-03-11 | Stop reason: SDUPTHER

## 2020-01-11 ENCOUNTER — APPOINTMENT (OUTPATIENT)
Dept: CT IMAGING | Age: 85
DRG: 195 | End: 2020-01-11
Attending: EMERGENCY MEDICINE
Payer: MEDICARE

## 2020-01-11 ENCOUNTER — HOSPITAL ENCOUNTER (INPATIENT)
Age: 85
LOS: 2 days | Discharge: HOME | DRG: 195 | End: 2020-01-14
Attending: EMERGENCY MEDICINE | Admitting: INTERNAL MEDICINE
Payer: MEDICARE

## 2020-01-11 DIAGNOSIS — J18.9 COMMUNITY ACQUIRED PNEUMONIA OF RIGHT LOWER LOBE OF LUNG: Primary | ICD-10-CM

## 2020-01-11 LAB
ABO + RH BLD: NORMAL
ALBUMIN SERPL-MCNC: 3 G/DL (ref 3.4–5)
ALBUMIN/GLOB SERPL: 0.9 {RATIO} (ref 0.8–1.7)
ALP SERPL-CCNC: 173 U/L (ref 45–117)
ALT SERPL-CCNC: 17 U/L (ref 13–56)
ANION GAP SERPL CALC-SCNC: 5 MMOL/L (ref 3–18)
AST SERPL-CCNC: 16 U/L (ref 10–38)
BASOPHILS # BLD: 0 K/UL (ref 0–0.1)
BASOPHILS NFR BLD: 0 % (ref 0–2)
BILIRUB SERPL-MCNC: 0.9 MG/DL (ref 0.2–1)
BLOOD GROUP ANTIBODIES SERPL: NORMAL
BNP SERPL-MCNC: 4462 PG/ML (ref 0–1800)
BUN SERPL-MCNC: 30 MG/DL (ref 7–18)
BUN/CREAT SERPL: 18 (ref 12–20)
CALCIUM SERPL-MCNC: 8.2 MG/DL (ref 8.5–10.1)
CHLORIDE SERPL-SCNC: 109 MMOL/L (ref 100–111)
CO2 SERPL-SCNC: 22 MMOL/L (ref 21–32)
CREAT SERPL-MCNC: 1.66 MG/DL (ref 0.6–1.3)
DIFFERENTIAL METHOD BLD: ABNORMAL
EOSINOPHIL # BLD: 0.4 K/UL (ref 0–0.4)
EOSINOPHIL NFR BLD: 4 % (ref 0–5)
ERYTHROCYTE [DISTWIDTH] IN BLOOD BY AUTOMATED COUNT: 20.1 % (ref 11.6–14.5)
GLOBULIN SER CALC-MCNC: 3.4 G/DL (ref 2–4)
GLUCOSE SERPL-MCNC: 87 MG/DL (ref 74–99)
HCT VFR BLD AUTO: 27.8 % (ref 35–45)
HGB BLD-MCNC: 8.6 G/DL (ref 12–16)
INR PPP: 1.1 (ref 0.8–1.2)
LACTATE BLD-SCNC: 1.07 MMOL/L (ref 0.4–2)
LIPASE SERPL-CCNC: 77 U/L (ref 73–393)
LYMPHOCYTES # BLD: 1.8 K/UL (ref 0.9–3.6)
LYMPHOCYTES NFR BLD: 19 % (ref 21–52)
MCH RBC QN AUTO: 24.2 PG (ref 24–34)
MCHC RBC AUTO-ENTMCNC: 30.9 G/DL (ref 31–37)
MCV RBC AUTO: 78.1 FL (ref 74–97)
MONOCYTES # BLD: 0.8 K/UL (ref 0.05–1.2)
MONOCYTES NFR BLD: 8 % (ref 3–10)
NEUTS SEG # BLD: 6.5 K/UL (ref 1.8–8)
NEUTS SEG NFR BLD: 69 % (ref 40–73)
PLATELET # BLD AUTO: 192 K/UL (ref 135–420)
POTASSIUM SERPL-SCNC: 3.9 MMOL/L (ref 3.5–5.5)
PROT SERPL-MCNC: 6.4 G/DL (ref 6.4–8.2)
PROTHROMBIN TIME: 14 SEC (ref 11.5–15.2)
RBC # BLD AUTO: 3.56 M/UL (ref 4.2–5.3)
SODIUM SERPL-SCNC: 136 MMOL/L (ref 136–145)
SPECIMEN EXP DATE BLD: NORMAL
TROPONIN I SERPL-MCNC: 0.06 NG/ML (ref 0–0.04)
WBC # BLD AUTO: 9.5 K/UL (ref 4.6–13.2)

## 2020-01-11 PROCEDURE — 96367 TX/PROPH/DG ADDL SEQ IV INF: CPT

## 2020-01-11 PROCEDURE — 93005 ELECTROCARDIOGRAM TRACING: CPT

## 2020-01-11 PROCEDURE — 80053 COMPREHEN METABOLIC PANEL: CPT

## 2020-01-11 PROCEDURE — 74176 CT ABD & PELVIS W/O CONTRAST: CPT

## 2020-01-11 PROCEDURE — 83690 ASSAY OF LIPASE: CPT

## 2020-01-11 PROCEDURE — 85025 COMPLETE CBC W/AUTO DIFF WBC: CPT

## 2020-01-11 PROCEDURE — 83880 ASSAY OF NATRIURETIC PEPTIDE: CPT

## 2020-01-11 PROCEDURE — 96365 THER/PROPH/DIAG IV INF INIT: CPT

## 2020-01-11 PROCEDURE — 86900 BLOOD TYPING SEROLOGIC ABO: CPT

## 2020-01-11 PROCEDURE — 83605 ASSAY OF LACTIC ACID: CPT

## 2020-01-11 PROCEDURE — 99285 EMERGENCY DEPT VISIT HI MDM: CPT

## 2020-01-11 PROCEDURE — 84484 ASSAY OF TROPONIN QUANT: CPT

## 2020-01-11 PROCEDURE — 83036 HEMOGLOBIN GLYCOSYLATED A1C: CPT

## 2020-01-11 PROCEDURE — 85610 PROTHROMBIN TIME: CPT

## 2020-01-11 RX ORDER — SIMETHICONE 80 MG
80 TABLET,CHEWABLE ORAL
Status: DISCONTINUED | OUTPATIENT
Start: 2020-01-11 | End: 2020-01-14 | Stop reason: HOSPADM

## 2020-01-11 RX ORDER — CEFTRIAXONE 1 G/1
1 INJECTION, POWDER, FOR SOLUTION INTRAMUSCULAR; INTRAVENOUS
Status: DISCONTINUED | OUTPATIENT
Start: 2020-01-11 | End: 2020-01-11

## 2020-01-11 RX ORDER — ONDANSETRON 2 MG/ML
8 INJECTION INTRAMUSCULAR; INTRAVENOUS
Status: DISPENSED | OUTPATIENT
Start: 2020-01-11 | End: 2020-01-12

## 2020-01-11 RX ORDER — FAMOTIDINE 20 MG/1
20 TABLET, FILM COATED ORAL
COMMUNITY

## 2020-01-11 RX ORDER — MORPHINE SULFATE 4 MG/ML
4 INJECTION, SOLUTION INTRAMUSCULAR; INTRAVENOUS
Status: DISPENSED | OUTPATIENT
Start: 2020-01-11 | End: 2020-01-12

## 2020-01-12 ENCOUNTER — APPOINTMENT (OUTPATIENT)
Dept: GENERAL RADIOLOGY | Age: 85
DRG: 195 | End: 2020-01-12
Attending: EMERGENCY MEDICINE
Payer: MEDICARE

## 2020-01-12 PROBLEM — J18.9 COMMUNITY ACQUIRED PNEUMONIA: Status: ACTIVE | Noted: 2020-01-12

## 2020-01-12 PROBLEM — M48.061 SPINAL STENOSIS AT L4-L5 LEVEL: Status: ACTIVE | Noted: 2020-01-12

## 2020-01-12 PROBLEM — Z72.0 TOBACCO ABUSE: Status: ACTIVE | Noted: 2020-01-12

## 2020-01-12 PROBLEM — Z87.891 FORMER SMOKER: Status: ACTIVE | Noted: 2020-01-12

## 2020-01-12 LAB
EST. AVERAGE GLUCOSE BLD GHB EST-MCNC: 111 MG/DL
GLUCOSE BLD STRIP.AUTO-MCNC: 108 MG/DL (ref 70–110)
GLUCOSE BLD STRIP.AUTO-MCNC: 131 MG/DL (ref 70–110)
GLUCOSE BLD STRIP.AUTO-MCNC: 162 MG/DL (ref 70–110)
HBA1C MFR BLD: 5.5 % (ref 4.2–5.6)
TROPONIN I SERPL-MCNC: 0.06 NG/ML (ref 0–0.04)

## 2020-01-12 PROCEDURE — 74011250636 HC RX REV CODE- 250/636: Performed by: INTERNAL MEDICINE

## 2020-01-12 PROCEDURE — 74011000258 HC RX REV CODE- 258: Performed by: EMERGENCY MEDICINE

## 2020-01-12 PROCEDURE — 82962 GLUCOSE BLOOD TEST: CPT

## 2020-01-12 PROCEDURE — 65660000000 HC RM CCU STEPDOWN

## 2020-01-12 PROCEDURE — 74011250636 HC RX REV CODE- 250/636: Performed by: EMERGENCY MEDICINE

## 2020-01-12 PROCEDURE — 74011636637 HC RX REV CODE- 636/637: Performed by: INTERNAL MEDICINE

## 2020-01-12 PROCEDURE — 74011250637 HC RX REV CODE- 250/637: Performed by: INTERNAL MEDICINE

## 2020-01-12 PROCEDURE — C9113 INJ PANTOPRAZOLE SODIUM, VIA: HCPCS | Performed by: INTERNAL MEDICINE

## 2020-01-12 PROCEDURE — 84484 ASSAY OF TROPONIN QUANT: CPT

## 2020-01-12 PROCEDURE — 74011250637 HC RX REV CODE- 250/637: Performed by: EMERGENCY MEDICINE

## 2020-01-12 PROCEDURE — 74011250637 HC RX REV CODE- 250/637: Performed by: HOSPITALIST

## 2020-01-12 PROCEDURE — 71045 X-RAY EXAM CHEST 1 VIEW: CPT

## 2020-01-12 RX ORDER — NICOTINE 7MG/24HR
1 PATCH, TRANSDERMAL 24 HOURS TRANSDERMAL DAILY PRN
Status: DISCONTINUED | OUTPATIENT
Start: 2020-01-12 | End: 2020-01-14 | Stop reason: HOSPADM

## 2020-01-12 RX ORDER — HYDROCHLOROTHIAZIDE 25 MG/1
25 TABLET ORAL DAILY
Status: DISCONTINUED | OUTPATIENT
Start: 2020-01-12 | End: 2020-01-14 | Stop reason: HOSPADM

## 2020-01-12 RX ORDER — DEXTROSE MONOHYDRATE 100 MG/ML
125-250 INJECTION, SOLUTION INTRAVENOUS AS NEEDED
Status: DISCONTINUED | OUTPATIENT
Start: 2020-01-12 | End: 2020-01-14 | Stop reason: HOSPADM

## 2020-01-12 RX ORDER — SIMETHICONE 80 MG
TABLET,CHEWABLE ORAL
Status: COMPLETED
Start: 2020-01-12 | End: 2020-01-14

## 2020-01-12 RX ORDER — MAGNESIUM SULFATE 100 %
4 CRYSTALS MISCELLANEOUS AS NEEDED
Status: DISCONTINUED | OUTPATIENT
Start: 2020-01-12 | End: 2020-01-14 | Stop reason: HOSPADM

## 2020-01-12 RX ORDER — FAMOTIDINE 20 MG/1
20 TABLET, FILM COATED ORAL EVERY OTHER DAY
Status: DISCONTINUED | OUTPATIENT
Start: 2020-01-12 | End: 2020-01-14 | Stop reason: HOSPADM

## 2020-01-12 RX ORDER — ATENOLOL 25 MG/1
50 TABLET ORAL DAILY
Status: DISCONTINUED | OUTPATIENT
Start: 2020-01-12 | End: 2020-01-14 | Stop reason: HOSPADM

## 2020-01-12 RX ORDER — LANOLIN ALCOHOL/MO/W.PET/CERES
12 CREAM (GRAM) TOPICAL
Status: DISCONTINUED | OUTPATIENT
Start: 2020-01-12 | End: 2020-01-14 | Stop reason: HOSPADM

## 2020-01-12 RX ORDER — ACETAMINOPHEN 325 MG/1
650 TABLET ORAL
Status: DISCONTINUED | OUTPATIENT
Start: 2020-01-12 | End: 2020-01-14 | Stop reason: HOSPADM

## 2020-01-12 RX ORDER — PANTOPRAZOLE SODIUM 40 MG/10ML
40 INJECTION, POWDER, LYOPHILIZED, FOR SOLUTION INTRAVENOUS ONCE
Status: COMPLETED | OUTPATIENT
Start: 2020-01-12 | End: 2020-01-12

## 2020-01-12 RX ORDER — PANTOPRAZOLE SODIUM 20 MG/1
20 TABLET, DELAYED RELEASE ORAL
Status: DISCONTINUED | OUTPATIENT
Start: 2020-01-13 | End: 2020-01-14 | Stop reason: HOSPADM

## 2020-01-12 RX ORDER — CEFTRIAXONE 1 G/1
INJECTION, POWDER, FOR SOLUTION INTRAMUSCULAR; INTRAVENOUS
Status: DISPENSED
Start: 2020-01-12 | End: 2020-01-12

## 2020-01-12 RX ORDER — IPRATROPIUM BROMIDE AND ALBUTEROL SULFATE 2.5; .5 MG/3ML; MG/3ML
3 SOLUTION RESPIRATORY (INHALATION)
Status: DISCONTINUED | OUTPATIENT
Start: 2020-01-12 | End: 2020-01-14 | Stop reason: HOSPADM

## 2020-01-12 RX ORDER — HYDRALAZINE HYDROCHLORIDE 25 MG/1
100 TABLET, FILM COATED ORAL EVERY 8 HOURS
Status: DISCONTINUED | OUTPATIENT
Start: 2020-01-12 | End: 2020-01-14 | Stop reason: HOSPADM

## 2020-01-12 RX ORDER — SODIUM CHLORIDE 900 MG/100ML
INJECTION INTRAVENOUS
Status: DISPENSED
Start: 2020-01-12 | End: 2020-01-12

## 2020-01-12 RX ORDER — AMLODIPINE BESYLATE 10 MG/1
10 TABLET ORAL DAILY
Status: DISCONTINUED | OUTPATIENT
Start: 2020-01-12 | End: 2020-01-14 | Stop reason: HOSPADM

## 2020-01-12 RX ORDER — LOSARTAN POTASSIUM 50 MG/1
100 TABLET ORAL DAILY
Status: DISCONTINUED | OUTPATIENT
Start: 2020-01-12 | End: 2020-01-14 | Stop reason: HOSPADM

## 2020-01-12 RX ORDER — GUAIFENESIN 600 MG/1
600 TABLET, EXTENDED RELEASE ORAL EVERY 12 HOURS
Status: DISCONTINUED | OUTPATIENT
Start: 2020-01-12 | End: 2020-01-14 | Stop reason: HOSPADM

## 2020-01-12 RX ORDER — INSULIN LISPRO 100 [IU]/ML
INJECTION, SOLUTION INTRAVENOUS; SUBCUTANEOUS
Status: DISCONTINUED | OUTPATIENT
Start: 2020-01-12 | End: 2020-01-14 | Stop reason: HOSPADM

## 2020-01-12 RX ORDER — SIMVASTATIN 20 MG/1
20 TABLET, FILM COATED ORAL
Status: DISCONTINUED | OUTPATIENT
Start: 2020-01-12 | End: 2020-01-14 | Stop reason: HOSPADM

## 2020-01-12 RX ADMIN — HYDROCHLOROTHIAZIDE 25 MG: 25 TABLET ORAL at 11:38

## 2020-01-12 RX ADMIN — HYDRALAZINE HYDROCHLORIDE 100 MG: 25 TABLET, FILM COATED ORAL at 13:24

## 2020-01-12 RX ADMIN — SIMVASTATIN 20 MG: 20 TABLET, FILM COATED ORAL at 22:35

## 2020-01-12 RX ADMIN — GUAIFENESIN 600 MG: 600 TABLET, EXTENDED RELEASE ORAL at 22:35

## 2020-01-12 RX ADMIN — SIMETHICONE CHEW TAB 80 MG 80 MG: 80 TABLET ORAL at 00:32

## 2020-01-12 RX ADMIN — AZITHROMYCIN MONOHYDRATE 500 MG: 500 INJECTION, POWDER, LYOPHILIZED, FOR SOLUTION INTRAVENOUS at 01:18

## 2020-01-12 RX ADMIN — INSULIN LISPRO 2 UNITS: 100 INJECTION, SOLUTION INTRAVENOUS; SUBCUTANEOUS at 22:34

## 2020-01-12 RX ADMIN — HYDRALAZINE HYDROCHLORIDE 100 MG: 25 TABLET, FILM COATED ORAL at 22:35

## 2020-01-12 RX ADMIN — GUAIFENESIN 600 MG: 600 TABLET, EXTENDED RELEASE ORAL at 10:47

## 2020-01-12 RX ADMIN — ATENOLOL 50 MG: 25 TABLET ORAL at 11:38

## 2020-01-12 RX ADMIN — LOSARTAN POTASSIUM 100 MG: 50 TABLET, FILM COATED ORAL at 11:38

## 2020-01-12 RX ADMIN — AMLODIPINE BESYLATE 10 MG: 10 TABLET ORAL at 11:37

## 2020-01-12 RX ADMIN — PANTOPRAZOLE SODIUM 40 MG: 40 INJECTION, POWDER, FOR SOLUTION INTRAVENOUS at 10:47

## 2020-01-12 RX ADMIN — CEFTRIAXONE 1 G: 1 INJECTION, POWDER, FOR SOLUTION INTRAMUSCULAR; INTRAVENOUS at 00:36

## 2020-01-12 NOTE — ROUTINE PROCESS
TRANSFER - IN REPORT:    [facility] on Kaleigh Flores  being received from the ED (unit) for routine progression of care, and care assumed. Report consisted of patients Situation, Background, Assessment and   Recommendations(SBAR). Information from the following report(s) SBAR, Kardex, ED Summary, [facility] and Recent Results was reviewed with the receiving nurse. Assessment completed upon patients arrival to unit. Pt oriented to unit and room, all questions answered and clarifications provided, family at the bedside, call bell within reach, bed in the lowest locked position.

## 2020-01-12 NOTE — H&P
History and Physical    Patient: Fidencio Paulino MRN: [id number]  SSN: [id number]    YOB: 1933  Age: 80 y.o. Sex: female      Subjective:      Fidencio Paulino is a 80 y.o. African-American female who presents for complaint of Abdominal Pain. Patient states that she has had an 8-9/10 intensity \"ache\" in her epigastrium that \"just sits there\" that is constant, non-exertional, and improved with Maalox. Patient also states that she has been having a dry cough, wheeze, fatigue, swelling under her right eye, chills, nausea without vomiting, and dyspnea on exertion. Patient also reports intermittent, chronic diarrhea. Patient denies shortness of breath, vomiting, fever, pain with inspiration, and dysuria. Patient states that she has not been hospitalized overnight in the last 90 days. In [facility] ER, Patient was noted to have a Pneumonia of her Right lung base. Patient is admitted to [facility] Telemetry Unit for Community-Acquired Pneumonia. Past Medical History:   Diagnosis Date    Anemia     Central stenosis of spinal canal     Severe: L4-L5;  Moderate: L3-L4    Cholelithiasis     CKD (chronic kidney disease), stage III (HCC)     Diabetes mellitus, type 2 ([facility] [address]     HbA1c 6.1% (6/26/2019)    Elevated PTHrP level     x4 measurements    Hiatal hernia     Small    HLD (hyperlipidemia)     Hypertension     Tobacco abuse      Past Surgical History:   Procedure Laterality Date    HX APPENDECTOMY      HX CATARACT REMOVAL Bilateral     HX RETINAL DETACHMENT REPAIR Right 8/27/15      Family History   Problem Relation Age of Onset    Cancer Mother         Lung Cancer    No Known Problems Sister     Liver Disease Son         Hepatitis    No Known Problems Sister      Social History     Tobacco Use    Smoking status: Current Every Day Smoker     Packs/day: 0.50     Years: 40.00     Pack years: 20.00    Smokeless tobacco: Never Used    Tobacco comment: 0.5 PPD x40+ years; Cutback to 1/4 PPD currently   Substance Use Topics    Alcohol use: No      Patient is a Current, Everyday Smoker who Smoked 0.5 PPD x40+ years and has cutback to 1/4 PPD. Patient denies Vaping, ETOH consumption, and Illicit Drug Use. Prior to Admission medications    Medication Sig Start Date End Date Taking? Authorizing Provider   famotidine (PEPCID) 20 mg tablet Take 20 mg by mouth nightly. Yes Other, MD Neelima   amLODIPine (NORVASC) 10 mg tablet TAKE 1 TABLET BY MOUTH ONCE DAILY 11/26/19   Hilary Campos MD   atenolol (TENORMIN) 50 mg tablet TAKE ONE TABLET BY MOUTH TWICE DAILY 11/12/19   Hilary Campos MD   clotrimazole (LOTRIMIN) 1 % topical cream Apply  to affected area two (2) times a day. 11/12/19   Osmar Cabarl MD   insulin NPH (HUMULIN N NPH U-100 INSULIN) 100 unit/mL injection ADMINISTER 22 UNITS UNDER THE SKIN EVERY MORNING THEN ADMINISTER 12 UNITS UNDER THE SKIN EVERY EVENING 10/6/19   Narayan Santamaria MD   insulin lispro (HUMALOG U-100 INSULIN) 100 unit/mL injection INJECT 5 UNITS UNDER THE SKIN BEFORE BREAKFAST. DISCARD AFTER 28 DAYS OF OPENING 10/6/19   Narayan Santamaria MD   hydrALAZINE (APRESOLINE) 100 mg tablet TAKE 1 TABLET BY MOUTH THREE TIMES DAILY 6/26/19   Danitza Velarde MD   clotrimazole-betamethasone (LOTRISONE) topical cream AAA BID 6/26/19   Danitza Velarde MD   losartan-hydroCHLOROthiazide [facility]) 100-25 mg per tablet Take 1 Tab by mouth daily.  3/23/19   Danitza Velarde MD   ferrous sulfate 325 mg (65 mg iron) tablet TK 1 T PO D 7/10/18   Provider, Historical   Blood-Glucose Meter monitoring kit Use as directed to test blood glucose TID 4/27/18   Danitza Velarde MD   glucose blood VI test strips (ASCENSIA AUTODISC VI, ONE TOUCH ULTRA TEST VI) strip Use as directed to test blood glucose TID 4/27/18   Danitza Velarde MD   Lancets misc Use as directed to test blood glucose TID 4/27/18   Danitza Velarde MD   simvastatin (ZOCOR) 20 mg tablet TAKE 1 TABLET BY MOUTH EVERY OTHER DAY 4/25/18   Byron Diallo MD   betamethasone dipropionate (DIPROSONE) 0.05 % topical cream Apply  to affected area two (2) times a day. 7/26/17   Byron Diallo MD        Allergies   Allergen Reactions    Aspirin Nausea Only    Bactrim [Sulfamethoprim] Nausea Only     And dizziness and loss of appetite    Clindamycin Other (comments)     Tongue pain and metallic taste    Pcn [Penicillins] Nausea and Vomiting and Hives    Sulfamethoxazole-Trimethoprim Itching    Tetanus Toxoid,Fluid Hives    Tetracycline Nausea Only    Tetracyclines Other (comments)     Upset stomach    Tramadol Other (comments)     Nausea and vomiting       Review of Systems:  (+) Chills  (+) Fatigue  (+) Cough  (+) Wheezing  (+) Dyspnea on Exertion  (+) Chest Pain (When coughing)  (+) Abdominal Pain  (+) Nausea  (+) Diarrhea  (-) Fevers  (-) Increased Sputum Production  (-) Pain with Inspiration  (-) Shortness of Breath  (-) Vomiting  (-) Dysuria  All other systems have been reviewed and are negative      Objective:     Vitals:    01/12/20 0900 01/12/20 0915 01/12/20 0930 01/12/20 0945   BP: 169/73   175/51   Pulse:   (!) 56 (!) 56   Resp:   13 16   Temp:       SpO2: 99% 98% 99% 100%   Weight:       Height:            Physical Exam:  General:  Elderly Adult -American female lying in bed in no acute distress  HEENT:  Atraumatic, normocephalic; Pupils equally round and reactive to light with accommodation; Extraocular muscles intact; (+) Bilateral Pseudophakia; Moist Oropharynx without erythema, edema, or exudates; (+) Upper Dental Device in place  Neck:  No Bruits; No Lymphadenopathy  Chest:  No pectus carinatum;  No pectus excavatum  Cardiovascular:  Regular rate and rhythm without rubs, gallops, or murmurs  Respiratory:  Clear to Auscultation Bilaterally without wheezes, rales, or rhonchi; normal effort of breathing  Abdominal:  Soft, non-tense, (+) Mild to Moderate Epigastric tenderness; BS present without guarding, rebound, or masses  :  Deferred  Extremities:  Pulses 2+ x4 with (+) 1+ Pitting edema to Right RLE Mid-[place] and (+) 1+ Pitting edema to LLE Distal-[place] without clubbing or cyanosis  Musculoskeletal:  Strength 5/5 and symmetrical in BUE and BLE  Integument:  No rash on face, forearms, or legs  Neurological:  A&O x4/4; No gross deficits of Visual Acuity, Eye Movement, Jaw Opening, Facial Expression, Hearing, Phonation, or Head Movement; No gross deficits of Tongue Movement or Slurring of Speech  Psychiatric:  Affect is appropriate; Language is present and fluent; Behavior is appropriate      I have personally reviewed the CXR and have found, per my read, consolidation appears to be in RLL, likely Pneumonia. Assessment:     Hospital Problems  Date Reviewed: 1/12/2020          Codes Class Noted POA    * (Principal) Community acquired pneumonia ICD-10-CM: J18.9  ICD-9-CM: 486  1/12/2020 Yes        Type 2 diabetes mellitus with nephropathy ([facility] ICD-10-CM: E11.21  ICD-9-CM: 250.40, 583.81  1/25/2018 Yes        Tobacco abuse ICD-10-CM: Z72.0  ICD-9-CM: 305.1  1/12/2020 Yes        CKD (chronic kidney disease) stage 3, GFR 30-59 ml/min ([facility]) ICD-10-CM: N18.3  ICD-9-CM: 585.3  11/20/2012 Yes        Hypertension ICD-10-CM: I10  ICD-9-CM: 401.9  2/16/2012 Yes        Hyperlipidemia ICD-10-CM: E78.5  ICD-9-CM: 272.4  2/16/2012 Yes        Spinal stenosis at L4-L5 level ICD-10-CM: M48.061  ICD-9-CM: 724.02  1/12/2020 Yes              Plan:     IV Ceftriaxone, IV Azithromycin, Guaifenesin, Sputum Culture, Blood Cultures obtained in [facility] ER, PRN Acetaminophen, PRN Ondansetron, and PRN Duonebs. IV Pantoprazole xONCE with PO Pantoprazole qAM and PRN Maalox. Continue home medications for HTN and HLD. Serum Glucose checks qACHS with Corrective Insulin. Check HbA1c. PRN Nicotine Patch. DVT mechanoprophylaxis.     Signed By: Shreya Arguelles DO     January 12, 2020

## 2020-01-12 NOTE — ED PROVIDER NOTES
EMERGENCY DEPARTMENT HISTORY AND PHYSICAL EXAM      Date: 1/11/2020  Patient Name: Magaly Everett    History of Presenting Illness     Chief Complaint   Patient presents with    Abdominal Pain       History (Context): Magaly Everett is a 80 y.o. gentleman with active comorbid conditions as noted below who presents with subacute onset, moderate, localized epigastric fullness and bloating associated with shortness of breath and intermittent coughing. No exacerbating/relieving features or other associated symptoms. On review of systems, the patient denies fever, chills, nausea, vomiting, diarrhea, back pain, chest pain, shortness of breath, diaphoresis, rashes, tick bite, recent travel. PCP: Elias Castro MD    Current Facility-Administered Medications   Medication Dose Route Frequency Provider Last Rate Last Dose    0.9% sodium chloride (MBP/ADV) infusion             guaiFENesin ER (MUCINEX) tablet 600 mg  600 mg Oral Q12H Max Lopez DO        morphine injection 4 mg  4 mg IntraVENous ONCE PRN Raleigh Spangler MD        ondansetron [facility]) injection 8 mg  8 mg IntraVENous ONCE PRN Raleigh Spangler MD        [facility]) tablet 80 mg  80 mg Oral QID PRN Raleigh Spangler MD   80 mg at 01/12/20 0032     Current Outpatient Medications   Medication Sig Dispense Refill    famotidine (PEPCID) 20 mg tablet Take 20 mg by mouth nightly.  amLODIPine (NORVASC) 10 mg tablet TAKE 1 TABLET BY MOUTH ONCE DAILY 90 Tab 0    atenolol (TENORMIN) 50 mg tablet TAKE ONE TABLET BY MOUTH TWICE DAILY 180 Tab 1    clotrimazole (LOTRIMIN) 1 % topical cream Apply  to affected area two (2) times a day. 15 g 2    insulin NPH (HUMULIN N NPH U-100 INSULIN) 100 unit/mL injection ADMINISTER 22 UNITS UNDER THE SKIN EVERY MORNING THEN ADMINISTER 12 UNITS UNDER THE SKIN EVERY EVENING 20 mL 2    insulin lispro (HUMALOG U-100 INSULIN) 100 unit/mL injection INJECT 5 UNITS UNDER THE SKIN BEFORE BREAKFAST. DISCARD AFTER 28 DAYS OF OPENING 10 mL 2    hydrALAZINE (APRESOLINE) 100 mg tablet TAKE 1 TABLET BY MOUTH THREE TIMES DAILY 270 Tab 5    clotrimazole-betamethasone (LOTRISONE) topical cream AAA BID 15 g 0    losartan-hydroCHLOROthiazide (HYZAAR) 100-25 mg per tablet Take 1 Tab by mouth daily. 90 Tab 5    ferrous sulfate 325 mg (65 mg iron) tablet TK 1 T PO D  12    Blood-Glucose Meter monitoring kit Use as directed to test blood glucose TID 1 Kit 0    glucose blood VI test strips (ASCENSIA AUTODISC VI, ONE TOUCH ULTRA TEST VI) strip Use as directed to test blood glucose  Strip 3    Lancets misc Use as directed to test blood glucose  Each 3    simvastatin (ZOCOR) 20 mg tablet TAKE 1 TABLET BY MOUTH EVERY OTHER DAY 45 Tab 3    betamethasone dipropionate (DIPROSONE) 0.05 % topical cream Apply  to affected area two (2) times a day. 45 g 1       Past History     Past Medical History:  Past Medical History:   Diagnosis Date    Diabetes ([address]     Hypertension     Other and unspecified hyperlipidemia 2/16/2012       Past Surgical History:  Past Surgical History:   Procedure Laterality Date    HX APPENDECTOMY      HX HEENT      HX RETINAL DETACHMENT REPAIR  8/27/15    Right eye       Family History:  History reviewed. No pertinent family history. Social History:  Social History     Tobacco Use    Smoking status: Current Every Day Smoker     Packs/day: 0.50     Years: 40.00     Pack years: 20.00    Smokeless tobacco: Never Used   Substance Use Topics    Alcohol use: No    Drug use: No       Allergies:   Allergies   Allergen Reactions    Aspirin Nausea Only    Bactrim [Sulfamethoprim] Nausea Only     And dizziness and loss of appetite    Clindamycin Other (comments)     Tongue pain and metallic taste    Pcn [Penicillins] Nausea and Vomiting and Hives    Sulfamethoxazole-Trimethoprim Itching    Tetanus Toxoid,Fluid Hives    Tetracycline Nausea Only    Tetracyclines Other (comments) Upset stomach    Tramadol Other (comments)     Nausea and vomiting         Review of Systems   As per HPI, otherwise reviewed and negative. Physical Exam     Vitals:    01/12/20 0715 01/12/20 0730 01/12/20 0815 01/12/20 0830   BP: 185/75 119/88 114/60 153/76   Pulse: (!) 53 (!) 57 (!) 53 (!) 54   Resp: 16      Temp:       SpO2: 98% 100% 98% 97%   Weight:       Height:           Gen: well-appearing in NAD  HEENT: Normocephalic, sclera anicteric  Cardiovascular: Normal rate, regular rhythm, no murmurs, rubs, gallops. Pulses intact and equal distally. Pulmonary: No respiratory distress. No stridor. Clear lungs. ABD: Soft, tender in the epigastrium, negative Diaz sign, nondistended. Neuro: Alert. Normal speech. Normal mentation. Psych: Normal thought content and thought processes. : No CVA tenderness  EXT: Moves all extremities well. No cyanosis or clubbing. Skin: Warm and well-perfused.         Diagnostic Study Results     Labs -     Recent Results (from the past 12 hour(s))   POC LACTIC ACID    Collection Time: 01/11/20  9:14 PM   Result Value Ref Range    Lactic Acid (POC) 1.07 0.40 - 2.00 mmol/L   EKG, 12 LEAD, INITIAL    Collection Time: 01/11/20  9:40 PM   Result Value Ref Range    Ventricular Rate 57 BPM    Atrial Rate 57 BPM    P-R Interval 112 ms    QRS Duration 88 ms    Q-T Interval 460 ms    QTC Calculation (Bezet) 447 ms    Calculated P Axis 60 degrees    Calculated R Axis 6 degrees    Calculated T Axis 63 degrees    Diagnosis       Sinus bradycardia  Nonspecific ST and T wave abnormality  Abnormal ECG  When compared with ECG of 24-AUG-2016 08:46,  premature ventricular complexes are no longer present  Questionable change in QRS axis     TROPONIN I    Collection Time: 01/12/20 12:55 AM   Result Value Ref Range    Troponin-I, QT 0.06 (H) 0.0 - 0.045 NG/ML       Radiologic Studies -   CT ABD PELV WO CONT    (Results Pending)   XR CHEST PORT    (Results Pending)     CT Results  (Last 48 hours)    None        CXR Results  (Last 48 hours)    None            Medical Decision Making   I am the first provider for this patient. I reviewed the vital signs, available nursing notes, past medical history, past surgical history, family history and social history. Vital Signs-Reviewed the patient's vital signs. EKG: Interpreted by myself. Records Reviewed: By myself personally on initial evaluation    MDM:   Patient presents with abdominal pain. Exam significant for tenderness in the epigastrium. DDX considered: Gastritis, peptic ulcer disease, cholecystitis, choledocholithiasis, SBO, functional abdominal pain, acute intermittent porphyria, gastroparesis, gastroenteritis, ACS, pneumonia, pneumothorax, unstable angina  DDX thought to be less likely but also considered due to high risk condition: Cholangitis, mesenteric ischemia. Plan:   Pain Control  Antiemetics  Close Observation    Orders as below:  Orders Placed This Encounter    CT ABD PELV WO CONT    XR CHEST PORT    CBC WITH AUTOMATED DIFF    PROTHROMBIN TIME + INR    COMPREHENSIVE METABOLIC PANEL    TROPONIN I    LIPASE    Pro BNP    TROPONIN I    POC LACTIC ACID    Cardiac Monitor    POC LACTIC ACID    EKG, 12 LEAD, INITIAL    TYPE & SCREEN    morphine injection 4 mg    ondansetron (ZOFRAN) injection 8 mg    famotidine (PEPCID) 20 mg tablet    simethicone (MYLICON) tablet 80 mg    DISCONTD: cefTRIAXone (ROCEPHIN) injection 1 g    azithromycin (ZITHROMAX) 500 mg in 0.9% sodium chloride 250 mL IVPB    cefTRIAXone (ROCEPHIN) 1 g in 0.9% sodium chloride (MBP/ADV) 50 mL MBP    cefTRIAXone (ROCEPHIN) 1 gram injection    0.9% sodium chloride (MBP/ADV) infusion    simethicone (MYLICON) 80 mg tablet    guaiFENesin ER (MUCINEX) tablet 600 mg    INITIAL PHYSICIAN ORDER: INPATIENT Telemetry; 3.  Patient receiving treatment that can only be provided in an inpatient setting (further clarification in H&P documentation) ED Course:   ED Course as of Jan 12 0911   Sat Jan 11, 2020   2320 Will repeat at 0015   Troponin-I, Qt.(!): 0.06 [DT]   2345 Patient patient now stating she is having some shortness of breath. She states she has also been coughing for a couple of days. [DT]   Sun Jan 12, 2020   0401 Patient admitted for pneumonia. Patient stable. [DT]      ED Course User Index  [DT] Maricel Corrigan MD     The entirety of the rest of the work-up was negative. Disposition:  Admit      Clinical Impression:   1. Community acquired pneumonia of right lower lobe of lung ([facility] [address]        Signed,  Quin Balderas MD  Emergency Physician  [facility]  [facility]    As a voice dictation software was utilized to dictate this note, minor word transpositions can occur. I apologize for confusing wording and typographic errors. Please feel free to contact me for clarification.

## 2020-01-12 NOTE — ED NOTES
Bedside and Verbal shift change report given to renard metzger (oncoming nurse) by renard mayes (offgoing nurse). Report included the following information SBAR, ED Summary and MAR.

## 2020-01-12 NOTE — ED TRIAGE NOTES
Pt ambulatory to triage c/o mid upper abd pain x 1 wk. States some nausea, no vomiting. Poor appetite. States normal BM, last this morning, no straining.

## 2020-01-12 NOTE — PROGRESS NOTES
Problem: Discharge Planning  Goal: *Discharge to safe environment  Outcome: Progressing Towards Goal   Plan is Home    Reason for Admission:   Community Acquired Pneumonia               RRAT Score:  28                Resources/supports as identified by patient/family: Medicare A&B, Elin Tucker, son                  Top Challenges facing patient (as identified by patient/family and CM): Finances/Medication cost?                    Transportation?  son              Support system or lack thereof?  son                     Living arrangements? Lives alone               Self-care/ADLs/Cognition? Independent, alert and oriented          Current Advanced Directive/Advance Care Plan:  none                          Plan for utilizing home health:  No pt refused                   Transition of Care Plan:  Home    Interviewed pt and son at bedside. Information on facesheet verified and correct. Pt lives alone. Independent with ambulation and ADLs. Has cane but not in use. PCP was Dr Odessa Penn and since she left practice pt states that she's been looking for another PCP. She agreed for me to assist. Son. Arabella Moses, will transport pt home. Care Management Interventions  PCP Verified by CM: Yes(PCP left practice)  Last Visit to PCP: 06/26/19  Palliative Care Criteria Met (RRAT>21 & CHF Dx)?: No  Mode of Transport at Discharge: Other (see comment)(family)  Transition of Care Consult (CM Consult): Discharge Planning  Physical Therapy Consult: No  Occupational Therapy Consult: No  Speech Therapy Consult: No  Current Support Network: Lives Alone  Confirm Follow Up Transport: Family  Discharge Location  Discharge Placement: Home(pt refused Home health)      Patient has designated ___son___ to participate in his/her discharge plan and to receive any needed information.      Name: Dwayne Russo  Address:  Phone number: [phone]    PCP appointment made:    New or Established: New  Doctor: Juliann Dotson Sapna Pearson  Date :1/20/20  Time :1400    See provider location/contact information in follow up. Appointment information given to pt.

## 2020-01-12 NOTE — ED NOTES
Pt presents to the ed complaining of sob with ambulation and abdominal discomfort. Pt is resting on stretcher at time of assessment. No issues.

## 2020-01-12 NOTE — ED NOTES
TRANSFER - ED to INPATIENT REPORT:    SBAR report made available to receiving floor on this patient being transferred to [address] [place] [address] ([facility])  for routine progression of care       Admitting diagnosis Community acquired pneumonia [J18.9]    Information from the following report(s) SBAR, ED Summary and MAR was made available to receiving floor.     Lines:   Peripheral IV 01/11/20 Left Antecubital (Active)   Site Assessment Clean, dry, & intact 1/11/2020  9:17 PM   Phlebitis Assessment 0 1/11/2020  9:17 PM   Infiltration Assessment 0 1/11/2020  9:17 PM   Dressing Status Clean, dry, & intact 1/11/2020  9:17 PM   Dressing Type Transparent 1/11/2020  9:17 PM   Hub Color/Line Status Green 1/11/2020  9:17 PM        Patient is oriented to time, place, person and situation   Patient is  continent and ambulatory without assist     Valuables transported with patient       MAP (Monitor): 67 =Monitored (most recent)  Vitals w/ MEWS Score (last day)     Date/Time MEWS Score Pulse Resp Temp BP Level of Consciousness SpO2    01/12/20 14:19:48  0  (!) 58  14  97.8 °F (36.6 °C)  162/40  Alert  94 %    01/12/20 1400    (!) 52  15            01/12/20 1330    (!) 53  17    157/55    95 %    01/12/20 1324    60      155/76        01/12/20 1300    (!) 53  14    155/76    96 %    01/12/20 1215    60  20    172/56    99 %    01/12/20 1200    (!) 58  14    167/54    99 %    01/12/20 1137    60              01/12/20 1115    61  13    (!) 182/109    99 %    01/12/20 1045    63  16    171/56    99 %    01/12/20 0945    (!) 56  16    175/51    100 %    01/12/20 0930    (!) 56  13        99 %    01/12/20 0915              98 %    01/12/20 0900          169/73    99 %    01/12/20 0845    (!) 55      173/79    99 %    01/12/20 0830    (!) 54      153/76    97 %    01/12/20 0815    (!) 53      114/60    98 %    01/12/20 0730    (!) 57      119/88    100 %    01/12/20 0715    (!) 53  16   185/75    98 %    01/12/20 0645    (!) 56  17    176/51    99 %    01/12/20 0630          (!) 169/99    96 %    01/12/20 0600          169/48    99 %    01/12/20 0515          175/75    94 %    01/12/20 0500          180/90    95 %    01/12/20 0445          184/57    95 %    01/12/20 0430          182/52    96 %    01/12/20 0415    (!) 56  15    167/49    100 %    01/12/20 0400    (!) 56  14    172/51    99 %    01/12/20 0215    60  20    128/58    92 %    01/12/20 0100    (!) 56  15    178/48    (!) 89 %    01/12/20 0000    (!) 58  19    167/40    90 %    01/11/20 2309    61  24    181/50    94 %    01/11/20 2245    66  26    168/49    90 %    01/11/20 2230    (!) 58  20    184/50    91 %    01/11/20 2215    (!) 57  20    183/62    92 %    01/11/20 2200    (!) 55  17    172/55    92 %    01/11/20 2145    (!) 57  18    182/54    95 %    01/11/20 2136    (!) 57  19    175/54    96 %    01/11/20 2055  1  (!) 58  18  97.8 °F (36.6 °C)  162/62  Alert  96 %

## 2020-01-13 ENCOUNTER — APPOINTMENT (OUTPATIENT)
Dept: NON INVASIVE DIAGNOSTICS | Age: 85
DRG: 195 | End: 2020-01-13
Attending: HOSPITALIST
Payer: MEDICARE

## 2020-01-13 LAB
ATRIAL RATE: 57 BPM
CALCULATED P AXIS, ECG09: 60 DEGREES
CALCULATED R AXIS, ECG10: 6 DEGREES
CALCULATED T AXIS, ECG11: 63 DEGREES
DIAGNOSIS, 93000: NORMAL
ECHO AO ASC DIAM: 2.76 CM
ECHO AO ROOT DIAM: 2.65 CM
ECHO EST RA PRESSURE: 8 MMHG
ECHO IVC SNIFF: 2.68 CM
ECHO LA MAJOR AXIS: 3.79 CM
ECHO LA TO AORTIC ROOT RATIO: 1.43
ECHO LV EDV A2C: 111.1 ML
ECHO LV EDV A4C: 108.2 ML
ECHO LV EDV BP: 110 ML (ref 56–104)
ECHO LV EDV INDEX A4C: 61.3 ML/M2
ECHO LV EDV INDEX BP: 62.3 ML/M2
ECHO LV EDV NDEX A2C: 62.9 ML/M2
ECHO LV EDV TEICHHOLZ: 0.83 ML
ECHO LV ESV A2C: 31.9 ML
ECHO LV ESV A4C: 17.3 ML
ECHO LV ESV BP: 23.6 ML (ref 19–49)
ECHO LV ESV INDEX A2C: 18.1 ML/M2
ECHO LV ESV INDEX A4C: 9.8 ML/M2
ECHO LV ESV INDEX BP: 13.4 ML/M2
ECHO LV ESV TEICHHOLZ: 0.28 ML
ECHO LV INTERNAL DIMENSION DIASTOLIC: 5.32 CM (ref 3.9–5.3)
ECHO LV INTERNAL DIMENSION SYSTOLIC: 3.34 CM
ECHO LV IVSD: 0.96 CM (ref 0.6–0.9)
ECHO LV MASS 2D: 199.6 G (ref 67–162)
ECHO LV MASS INDEX 2D: 113.1 G/M2 (ref 43–95)
ECHO LV POSTERIOR WALL DIASTOLIC: 0.8 CM (ref 0.6–0.9)
ECHO LVOT DIAM: 1.75 CM
ECHO LVOT PEAK GRADIENT: 4.3 MMHG
ECHO LVOT PEAK VELOCITY: 103.77 CM/S
ECHO LVOT SV: 67.5 ML
ECHO LVOT VTI: 28.18 CM
ECHO MV A VELOCITY: 0 CM/S
ECHO MV AREA VTI: 1 CM2
ECHO MV E DECELERATION TIME (DT): 0 MS
ECHO MV E VELOCITY: 0 CM/S
ECHO MV MAX VELOCITY: 194.88 CM/S
ECHO MV MEAN GRADIENT: 4.9 MMHG
ECHO MV MEAN INFLOW VELOCITY: 1.01 M/S
ECHO MV PEAK GRADIENT: 15.2 MMHG
ECHO MV PRESSURE HALF TIME (PHT): 0 MS
ECHO MV VTI: 64.69 CM
ECHO PULMONARY ARTERY SYSTOLIC PRESSURE (PASP): 75 MMHG
ECHO RA MINOR AXIS: 3.29 CM
ECHO RIGHT VENTRICULAR SYSTOLIC PRESSURE (RVSP): 82.3 MMHG
ECHO TV REGURGITANT MAX VELOCITY: 431.03 CM/S
ECHO TV REGURGITANT PEAK GRADIENT: 74.3 MMHG
GLUCOSE BLD STRIP.AUTO-MCNC: 120 MG/DL (ref 70–110)
GLUCOSE BLD STRIP.AUTO-MCNC: 131 MG/DL (ref 70–110)
GLUCOSE BLD STRIP.AUTO-MCNC: 139 MG/DL (ref 70–110)
LVFS 2D: 37.21 %
LVOT MG: 1.98 MMHG
LVOT MV: 0.66 CM/S
LVSV (MOD BI): 48.44 ML
LVSV (MOD SINGLE 4C): 50.98 ML
LVSV (MOD SINGLE): 44.4 ML
LVSV (TEICH): 51.06 ML
MV DEC SLOPE: 0
P-R INTERVAL, ECG05: 112 MS
Q-T INTERVAL, ECG07: 460 MS
QRS DURATION, ECG06: 88 MS
QTC CALCULATION (BEZET), ECG08: 447 MS
VENTRICULAR RATE, ECG03: 57 BPM

## 2020-01-13 PROCEDURE — 74011250637 HC RX REV CODE- 250/637: Performed by: HOSPITALIST

## 2020-01-13 PROCEDURE — 93306 TTE W/DOPPLER COMPLETE: CPT

## 2020-01-13 PROCEDURE — 74011000258 HC RX REV CODE- 258: Performed by: HOSPITALIST

## 2020-01-13 PROCEDURE — 65660000000 HC RM CCU STEPDOWN

## 2020-01-13 PROCEDURE — 74011250637 HC RX REV CODE- 250/637: Performed by: INTERNAL MEDICINE

## 2020-01-13 PROCEDURE — 77030029684 HC NEB SM VOL KT MONA -A

## 2020-01-13 PROCEDURE — 82962 GLUCOSE BLOOD TEST: CPT

## 2020-01-13 PROCEDURE — 74011250636 HC RX REV CODE- 250/636: Performed by: HOSPITALIST

## 2020-01-13 RX ADMIN — PANTOPRAZOLE SODIUM 20 MG: 20 TABLET, DELAYED RELEASE ORAL at 10:20

## 2020-01-13 RX ADMIN — HYDRALAZINE HYDROCHLORIDE 100 MG: 25 TABLET, FILM COATED ORAL at 15:08

## 2020-01-13 RX ADMIN — GUAIFENESIN 600 MG: 600 TABLET, EXTENDED RELEASE ORAL at 20:40

## 2020-01-13 RX ADMIN — GUAIFENESIN 600 MG: 600 TABLET, EXTENDED RELEASE ORAL at 10:20

## 2020-01-13 RX ADMIN — HYDROCHLOROTHIAZIDE 25 MG: 25 TABLET ORAL at 10:20

## 2020-01-13 RX ADMIN — HYDRALAZINE HYDROCHLORIDE 100 MG: 25 TABLET, FILM COATED ORAL at 22:24

## 2020-01-13 RX ADMIN — AZITHROMYCIN MONOHYDRATE 500 MG: 500 INJECTION, POWDER, LYOPHILIZED, FOR SOLUTION INTRAVENOUS at 17:50

## 2020-01-13 RX ADMIN — CEFTRIAXONE 1 G: 1 INJECTION, POWDER, FOR SOLUTION INTRAMUSCULAR; INTRAVENOUS at 15:52

## 2020-01-13 RX ADMIN — HYDRALAZINE HYDROCHLORIDE 100 MG: 25 TABLET, FILM COATED ORAL at 05:52

## 2020-01-13 RX ADMIN — AMLODIPINE BESYLATE 10 MG: 10 TABLET ORAL at 10:20

## 2020-01-13 RX ADMIN — SIMVASTATIN 20 MG: 20 TABLET, FILM COATED ORAL at 22:25

## 2020-01-13 RX ADMIN — LOSARTAN POTASSIUM 100 MG: 50 TABLET, FILM COATED ORAL at 10:20

## 2020-01-13 RX ADMIN — ATENOLOL 50 MG: 25 TABLET ORAL at 10:20

## 2020-01-13 NOTE — PROGRESS NOTES
Medicine Progress Note    Patient: Savita Poster   Age:  80 y.o.  DOA: 1/11/2020   Admit Dx / CC: Community acquired pneumonia [J18.9]  LOS:  LOS: 1 day     Assessment/Plan   Principal Problem:    Community acquired pneumonia (1/12/2020)    Active Problems:    Hypertension (2/16/2012)      Hyperlipidemia (2/16/2012)      CKD (chronic kidney disease) stage 3, GFR 30-59 ml/min ([facility] [address] (11/20/2012)      Type 2 diabetes mellitus with nephropathy ([facility] [address] (1/25/2018)      Tobacco abuse (1/12/2020)      Spinal stenosis at L4-L5 level (1/12/2020)        Additional Plan notes     1)  CAP   - continue IV abx, wean 02 as tolerated    2)  Elevated bnp   - obtain echo    DC 1-2 days      DISPO        Anticipated Date of Discharge: 1-2 days  Anticipated Disposition (home, SNF) : home    Subjective:   Patient seen and examined. NAD, SOB improved, No chest pain. Objective:     Visit Vitals  /66 (BP 1 Location: Right arm, BP Patient Position: At rest)   Pulse 67   Temp 98.3 °F (36.8 °C)   Resp 15   Ht 5' 5\" (1.651 m)   Wt 69.4 kg (153 lb)   SpO2 93%   BMI 25.46 kg/m²       Physical Exam:  General appearance: alert, cooperative, no distress, appears stated age  Head: Normocephalic, without obvious abnormality, atraumatic  Neck: supple, trachea midline  Lungs: clear to auscultation bilaterally  Heart: regular rate and rhythm, S1, S2 normal, no murmur, click, rub or gallop  Abdomen: soft, non-tender. Bowel sounds normal. No masses,  no organomegaly  Extremities: extremities normal, atraumatic, no cyanosis or edema  Skin: Skin color, texture, turgor normal. No rashes or lesions  Neurologic: Grossly normal    Intake and Output:  Current Shift:  01/13 0701 - 01/13 1900  In: 720 [P.O.:720]  Out: -   Last three shifts:  No intake/output data recorded.     Lab/Data Reviewed:  CMP: No results found for: NA, K, CL, CO2, AGAP, GLU, BUN, CREA, GFRAA, GFRNA, CA, MG, PHOS, ALB, TBIL, TP, ALB, GLOB, AGRAT, SGOT, ALT, GPT  CBC: No results found for: WBC, HGB, HGBEXT, HCT, HCTEXT, PLT, PLTEXT, HGBEXT, HCTEXT, PLTEXT  All Cardiac Markers in the last 24 hours: No results found for: CPK, CK, CKMMB, CKMB, RCK3, CKMBT, CKNDX, CKND1, BRANDON, TROPT, TROIQ, AR, TROPT, TNIPOC, BNP, BNPP    Medications Reviewed:  Current Facility-Administered Medications   Medication Dose Route Frequency    amLODIPine (NORVASC) tablet 10 mg  10 mg Oral DAILY    atenolol (TENORMIN) tablet 50 mg  50 mg Oral DAILY    famotidine (PEPCID) tablet 20 mg  20 mg Oral EVERY OTHER DAY    hydrALAZINE (APRESOLINE) tablet 100 mg  100 mg Oral Q8H    simvastatin (ZOCOR) tablet 20 mg  20 mg Oral QHS    acetaminophen (TYLENOL) tablet 650 mg  650 mg Oral Q6H PRN    albuterol-ipratropium (DUO-NEB) 2.5 MG-0.5 MG/3 ML  3 mL Nebulization Q6H PRN    melatonin tablet 12 mg  12 mg Oral QHS PRN    insulin lispro (HUMALOG) injection   SubCUTAneous AC&HS    glucose chewable tablet 16 g  4 Tab Oral PRN    glucagon (GLUCAGEN) injection 1 mg  1 mg IntraMUSCular PRN    dextrose 10% infusion 125-250 mL  125-250 mL IntraVENous PRN    guaiFENesin ER (MUCINEX) tablet 600 mg  600 mg Oral Q12H    pantoprazole (PROTONIX) tablet 20 mg  20 mg Oral ACB    magic mouthwash  10 mL Oral Q6H PRN    nicotine (NICODERM CQ) 7 mg/24 hr patch 1 Patch  1 Patch TransDERmal DAILY PRN    losartan (COZAAR) tablet 100 mg  100 mg Oral DAILY    And    hydroCHLOROthiazide (HYDRODIURIL) tablet 25 mg  25 mg Oral DAILY    simethicone (MYLICON) tablet 80 mg  80 mg Oral QID PRN       Zach Madrid MD    January 13, 2020

## 2020-01-13 NOTE — ROUTINE PROCESS
Received verbal bedside report from [facility]. Pt awake, alert and oriented x 4, denies pain, No signs of distress noted at this time. Call bell within reach, bed in the lowest locked position. 1910 - Bedside and Verbal shift change report given to Jesus Sears RN (oncoming nurse) by Tessie Arvizu RN (offgoing nurse). Report included the following information SBAR, Kardex, Intake/Output, MAR and Recent Results.

## 2020-01-13 NOTE — PROGRESS NOTES
conducted an initial consultation and Spiritual Assessment for Delaney Augustine, who is a 80 y.o.,female. Patients Primary Language is: [place]. According to the patients EMR Restorationism Affiliation is: [facility].     The reason the Patient came to the hospital is:   Patient Active Problem List    Diagnosis Date Noted    Community acquired pneumonia 01/12/2020    Tobacco abuse 01/12/2020    Spinal stenosis at L4-L5 level 01/12/2020    Type 2 diabetes mellitus with nephropathy ([facility] [address] 01/25/2018    Essential hypertension 10/29/2015    CKD (chronic kidney disease) stage 3, GFR 30-59 ml/min ([address] 11/20/2012    Mastitis 11/20/2012    Hypertension 02/16/2012    Hyperlipidemia 02/16/2012    DJD (degenerative joint disease), multiple sites 02/16/2012    CKD (chronic kidney disease) stage 2, GFR 60-89 ml/min 02/16/2012    Anemia 02/16/2012        The  provided the following Interventions:  Initiated a relationship of care and support. Explored issues of shannon, spirituality and/or Episcopalian needs while hospitalized. Listened empathically. Provided chaplaincy education. Provided information about Spiritual Care Services. Offered prayer and assurance of continued prayers on patient's behalf. Chart reviewed. The following outcomes were achieved:  Patient shared some information about their medical narrative and spiritual journey/beliefs. Patient processed feeling about current hospitalization. Patient expressed gratitude for the 's visit. Assessment:  Patient did not indicate any spiritual or Episcopalian issues which require Spiritual Care Services interventions at this time. Patient does not have any Episcopalian/cultural needs that will affect patients preferences in health care. Plan:  Chaplains will continue to follow and will provide pastoral care on an as needed or requested basis.    recommends bedside caregivers page  on duty if patient shows signs of acute spiritual or emotional distress.     [address] [facility]   Staff [address] [facility] [place][note 2]   [phone]

## 2020-01-13 NOTE — PROGRESS NOTES
Problem: Falls - Risk of  Goal: *Absence of Falls  Description  Document Johnie Johnson Fall Risk and appropriate interventions in the flowsheet.   Outcome: Progressing Towards Goal  Note: Fall Risk Interventions:  Mobility Interventions: Communicate number of staff needed for ambulation/transfer, Patient to call before getting OOB         Medication Interventions: Patient to call before getting OOB, Teach patient to arise slowly    Elimination Interventions: Call light in reach, Toileting schedule/hourly rounds              Problem: Pneumonia: Day 2  Goal: Diagnostic Test/Procedures  Outcome: Progressing Towards Goal     Problem: Pneumonia: Day 2  Goal: Nutrition/Diet  Outcome: Progressing Towards Goal     Problem: Pneumonia: Day 2  Goal: Respiratory  Outcome: Progressing Towards Goal     Problem: Pneumonia: Day 2  Goal: *Oxygen saturation within defined limits  Outcome: Progressing Towards Goal     Problem: Pain  Goal: *Control of Pain  Outcome: Progressing Towards Goal

## 2020-01-13 NOTE — PROGRESS NOTES
1945 Bedside, Verbal and Written shift change report given to 1921 Vivi Power (oncoming nurse) by Irasema Beach (offgoing nurse). Report included the following information SBAR, Kardex, Intake/Output, MAR and Recent Results. Patient alert and oriented x4. Sleeping between care. Bed alarm activated. 2200 PM medications administered, pt tolerated with ease, respiratory called for PRN breathing treatment, will continue to monitor. 0000 Shift reassessment, pt condition unchanged, will continue to monitor. 0400  Shift reassessment, pt sleeping between care, will continue to monitor. 0377 Bedside, Verbal and Written shift change report given to Celestina Leung RN (oncoming nurse) by Nick Linn RN (offgoing nurse). Report included the following information SBAR, Kardex, Intake/Output, MAR and Recent Results. Skin assessment completed.

## 2020-01-13 NOTE — PROGRESS NOTES
Problem: Falls - Risk of  Goal: *Absence of Falls  Description  Document Arcadio Truong Fall Risk and appropriate interventions in the flowsheet.   Outcome: Progressing Towards Goal  Note: Fall Risk Interventions:  Mobility Interventions: Bed/chair exit alarm, Patient to call before getting OOB         Medication Interventions: Bed/chair exit alarm, Patient to call before getting OOB    Elimination Interventions: Bed/chair exit alarm, Call light in reach, Patient to call for help with toileting needs              Problem: Patient Education: Go to Patient Education Activity  Goal: Patient/Family Education  Outcome: Progressing Towards Goal     Problem: Discharge Planning  Goal: *Discharge to safe environment  Outcome: Progressing Towards Goal     Problem: Pneumonia: Day 2  Goal: Activity/Safety  Outcome: Progressing Towards Goal  Goal: Consults, if ordered  Outcome: Progressing Towards Goal  Goal: Diagnostic Test/Procedures  Outcome: Progressing Towards Goal  Goal: Nutrition/Diet  Outcome: Progressing Towards Goal  Goal: Discharge Planning  Outcome: Progressing Towards Goal  Goal: Medications  Outcome: Progressing Towards Goal  Goal: Respiratory  Outcome: Progressing Towards Goal  Goal: Treatments/Interventions/Procedures  Outcome: Progressing Towards Goal  Goal: Psychosocial  Outcome: Progressing Towards Goal  Goal: *Oxygen saturation within defined limits  Outcome: Progressing Towards Goal  Goal: *Hemodynamically stable  Outcome: Progressing Towards Goal  Goal: *Demonstrates progressive activity  Outcome: Progressing Towards Goal  Goal: *Tolerating diet  Outcome: Progressing Towards Goal  Goal: *Optimal pain control at patient's stated goal  Outcome: Progressing Towards Goal     Problem: Pneumonia: Day 3  Goal: Activity/Safety  Outcome: Progressing Towards Goal  Goal: Consults, if ordered  Outcome: Progressing Towards Goal  Goal: Diagnostic Test/Procedures  Outcome: Progressing Towards Goal  Goal: Nutrition/Diet  Outcome: Progressing Towards Goal  Goal: Discharge Planning  Outcome: Progressing Towards Goal  Goal: Medications  Outcome: Progressing Towards Goal  Goal: Respiratory  Outcome: Progressing Towards Goal  Goal: Treatments/Interventions/Procedures  Outcome: Progressing Towards Goal  Goal: Psychosocial  Outcome: Progressing Towards Goal  Goal: *Oxygen saturation within defined limits  Outcome: Progressing Towards Goal  Goal: *Hemodynamically stable  Outcome: Progressing Towards Goal  Goal: *Demonstrates progressive activity  Outcome: Progressing Towards Goal  Goal: *Tolerating diet  Outcome: Progressing Towards Goal  Goal: *Describes available resources and support systems  Outcome: Progressing Towards Goal  Goal: *Optimal pain control at patient's stated goal  Outcome: Progressing Towards Goal     Problem: Pneumonia: Day 4  Goal: Activity/Safety  Outcome: Progressing Towards Goal  Goal: Nutrition/Diet  Outcome: Progressing Towards Goal  Goal: Discharge Planning  Outcome: Progressing Towards Goal  Goal: Medications  Outcome: Progressing Towards Goal  Goal: Respiratory  Outcome: Progressing Towards Goal  Goal: Treatments/Interventions/Procedures  Outcome: Progressing Towards Goal  Goal: Psychosocial  Outcome: Progressing Towards Goal     Problem: Pneumonia: Discharge Outcomes  Goal: *Demonstrates progressive activity  Outcome: Progressing Towards Goal  Goal: *Describes follow-up/return visits to physicians  Outcome: Progressing Towards Goal  Goal: *Tolerating diet  Outcome: Progressing Towards Goal  Goal: *Verbalizes name, dosage, time, side effects, and number of days to continue medications  Outcome: Progressing Towards Goal  Goal: *Influenza immunization  Outcome: Progressing Towards Goal  Goal: *Pneumococcal immunization  Outcome: Progressing Towards Goal  Goal: *Respiratory status at baseline  Outcome: Progressing Towards Goal  Goal: *Vital signs within defined limits  Outcome: Progressing Towards Goal  Goal: *Describes available resources and support systems  Outcome: Progressing Towards Goal  Goal: *Optimal pain control at patient's stated goal  Outcome: Progressing Towards Goal

## 2020-01-14 VITALS
OXYGEN SATURATION: 92 % | RESPIRATION RATE: 18 BRPM | HEART RATE: 60 BPM | BODY MASS INDEX: 25.49 KG/M2 | HEIGHT: 65 IN | DIASTOLIC BLOOD PRESSURE: 65 MMHG | TEMPERATURE: 98.2 F | WEIGHT: 153 LBS | SYSTOLIC BLOOD PRESSURE: 168 MMHG

## 2020-01-14 LAB
GLUCOSE BLD STRIP.AUTO-MCNC: 102 MG/DL (ref 70–110)
GLUCOSE BLD STRIP.AUTO-MCNC: 127 MG/DL (ref 70–110)
GLUCOSE BLD STRIP.AUTO-MCNC: 151 MG/DL (ref 70–110)

## 2020-01-14 PROCEDURE — 82962 GLUCOSE BLOOD TEST: CPT

## 2020-01-14 PROCEDURE — 74011636637 HC RX REV CODE- 636/637: Performed by: INTERNAL MEDICINE

## 2020-01-14 PROCEDURE — 74011250637 HC RX REV CODE- 250/637: Performed by: INTERNAL MEDICINE

## 2020-01-14 PROCEDURE — 74011000250 HC RX REV CODE- 250: Performed by: HOSPITALIST

## 2020-01-14 PROCEDURE — 74011250637 HC RX REV CODE- 250/637: Performed by: HOSPITALIST

## 2020-01-14 PROCEDURE — 74011250636 HC RX REV CODE- 250/636: Performed by: HOSPITALIST

## 2020-01-14 PROCEDURE — 74011000258 HC RX REV CODE- 258: Performed by: HOSPITALIST

## 2020-01-14 PROCEDURE — 74011000250 HC RX REV CODE- 250: Performed by: INTERNAL MEDICINE

## 2020-01-14 RX ORDER — AZITHROMYCIN 250 MG/1
500 TABLET, FILM COATED ORAL DAILY
Status: DISCONTINUED | OUTPATIENT
Start: 2020-01-14 | End: 2020-01-14 | Stop reason: HOSPADM

## 2020-01-14 RX ORDER — LEVOFLOXACIN 750 MG/1
750 TABLET ORAL DAILY
Qty: 5 TAB | Refills: 0 | Status: SHIPPED | OUTPATIENT
Start: 2020-01-14 | End: 2020-02-25

## 2020-01-14 RX ORDER — BUMETANIDE 0.25 MG/ML
2 INJECTION INTRAMUSCULAR; INTRAVENOUS ONCE
Status: COMPLETED | OUTPATIENT
Start: 2020-01-14 | End: 2020-01-14

## 2020-01-14 RX ADMIN — SIMETHICONE CHEW TAB 80 MG 80 MG: 80 TABLET ORAL at 06:04

## 2020-01-14 RX ADMIN — AZITHROMYCIN MONOHYDRATE 500 MG: 250 TABLET ORAL at 17:18

## 2020-01-14 RX ADMIN — IPRATROPIUM BROMIDE AND ALBUTEROL SULFATE 3 ML: .5; 3 SOLUTION RESPIRATORY (INHALATION) at 01:15

## 2020-01-14 RX ADMIN — ATENOLOL 50 MG: 25 TABLET ORAL at 08:56

## 2020-01-14 RX ADMIN — PANTOPRAZOLE SODIUM 20 MG: 20 TABLET, DELAYED RELEASE ORAL at 08:56

## 2020-01-14 RX ADMIN — CEFTRIAXONE 1 G: 1 INJECTION, POWDER, FOR SOLUTION INTRAMUSCULAR; INTRAVENOUS at 16:25

## 2020-01-14 RX ADMIN — INSULIN LISPRO 2 UNITS: 100 INJECTION, SOLUTION INTRAVENOUS; SUBCUTANEOUS at 10:34

## 2020-01-14 RX ADMIN — HYDRALAZINE HYDROCHLORIDE 100 MG: 25 TABLET, FILM COATED ORAL at 06:04

## 2020-01-14 RX ADMIN — AMLODIPINE BESYLATE 10 MG: 10 TABLET ORAL at 08:56

## 2020-01-14 RX ADMIN — LOSARTAN POTASSIUM 100 MG: 50 TABLET, FILM COATED ORAL at 08:56

## 2020-01-14 RX ADMIN — GUAIFENESIN 600 MG: 600 TABLET, EXTENDED RELEASE ORAL at 08:56

## 2020-01-14 RX ADMIN — HYDROCHLOROTHIAZIDE 25 MG: 25 TABLET ORAL at 08:56

## 2020-01-14 RX ADMIN — BUMETANIDE 2 MG: 0.25 INJECTION INTRAMUSCULAR; INTRAVENOUS at 08:56

## 2020-01-14 NOTE — PROGRESS NOTES
Problem: Falls - Risk of  Goal: *Absence of Falls  Description  Document Carlos Pacheco Fall Risk and appropriate interventions in the flowsheet.   Outcome: Progressing Towards Goal  Note: Fall Risk Interventions:  Mobility Interventions: Communicate number of staff needed for ambulation/transfer, Bed/chair exit alarm, Patient to call before getting OOB         Medication Interventions: Teach patient to arise slowly, Patient to call before getting OOB, Evaluate medications/consider consulting pharmacy, Bed/chair exit alarm    Elimination Interventions: Bed/chair exit alarm, Call light in reach, Patient to call for help with toileting needs, Stay With Me (per policy), Toilet paper/wipes in reach, Toileting schedule/hourly rounds              Problem: Patient Education: Go to Patient Education Activity  Goal: Patient/Family Education  Outcome: Progressing Towards Goal     Problem: Pneumonia: Day 2  Goal: Activity/Safety  Outcome: Progressing Towards Goal  Goal: Consults, if ordered  Outcome: Progressing Towards Goal  Goal: Diagnostic Test/Procedures  Outcome: Progressing Towards Goal  Goal: Nutrition/Diet  Outcome: Progressing Towards Goal  Goal: Discharge Planning  Outcome: Progressing Towards Goal  Goal: Medications  Outcome: Progressing Towards Goal  Goal: Respiratory  Outcome: Progressing Towards Goal  Goal: Treatments/Interventions/Procedures  Outcome: Progressing Towards Goal  Goal: Psychosocial  Outcome: Progressing Towards Goal  Goal: *Oxygen saturation within defined limits  Outcome: Progressing Towards Goal  Goal: *Hemodynamically stable  Outcome: Progressing Towards Goal  Goal: *Demonstrates progressive activity  Outcome: Progressing Towards Goal  Goal: *Tolerating diet  Outcome: Progressing Towards Goal  Goal: *Optimal pain control at patient's stated goal  Outcome: Progressing Towards Goal     Problem: Pneumonia: Day 2  Goal: Activity/Safety  Outcome: Progressing Towards Goal  Goal: Consults, if ordered  Outcome: Progressing Towards Goal  Goal: Diagnostic Test/Procedures  Outcome: Progressing Towards Goal  Goal: Nutrition/Diet  Outcome: Progressing Towards Goal  Goal: Discharge Planning  Outcome: Progressing Towards Goal  Goal: Medications  Outcome: Progressing Towards Goal  Goal: Respiratory  Outcome: Progressing Towards Goal  Goal: Treatments/Interventions/Procedures  Outcome: Progressing Towards Goal  Goal: Psychosocial  Outcome: Progressing Towards Goal  Goal: *Oxygen saturation within defined limits  Outcome: Progressing Towards Goal  Goal: *Hemodynamically stable  Outcome: Progressing Towards Goal  Goal: *Demonstrates progressive activity  Outcome: Progressing Towards Goal  Goal: *Tolerating diet  Outcome: Progressing Towards Goal  Goal: *Optimal pain control at patient's stated goal  Outcome: Progressing Towards Goal     Problem: Pain  Goal: *Control of Pain  Outcome: Progressing Towards Goal

## 2020-01-14 NOTE — ANCILLARY DISCHARGE INSTRUCTIONS
Core Measures Patient - RRAT Score is 28, Patient has Transitional Care follow up with Dr. Uli Tovar on 1/20/2020 at 1:30pm.

## 2020-01-14 NOTE — DISCHARGE INSTRUCTIONS
DISCHARGE SUMMARY from Nurse    PATIENT INSTRUCTIONS:    After general anesthesia or intravenous sedation, for 24 hours or while taking prescription Narcotics:  · Limit your activities  · Do not drive and operate hazardous machinery  · Do not make important personal or business decisions  · Do  not drink alcoholic beverages  · If you have not urinated within 8 hours after discharge, please contact your surgeon on call. Report the following to your surgeon:  · Excessive pain, swelling, redness or odor of or around the surgical area  · Temperature over 100.5  · Nausea and vomiting lasting longer than 4 hours or if unable to take medications  · Any signs of decreased circulation or nerve impairment to extremity: change in color, persistent  numbness, tingling, coldness or increase pain  · Any questions    What to do at Home:  Recommended activity: Activity as tolerated    If you experience any of the following symptoms fever, chills, worsening shortness of breath, or thick yellow/green sputum, please follow up with primary care physician/emergency room. *  Please give a list of your current medications to your Primary Care Provider. *  Please update this list whenever your medications are discontinued, doses are      changed, or new medications (including over-the-counter products) are added. *  Please carry medication information at all times in case of emergency situations. These are general instructions for a healthy lifestyle:    No smoking/ No tobacco products/ Avoid exposure to second hand smoke  Surgeon General's Warning:  Quitting smoking now greatly reduces serious risk to your health.     Obesity, smoking, and sedentary lifestyle greatly increases your risk for illness    A healthy diet, regular physical exercise & weight monitoring are important for maintaining a healthy lifestyle    You may be retaining fluid if you have a history of heart failure or if you experience any of the following symptoms:  Weight gain of 3 pounds or more overnight or 5 pounds in a week, increased swelling in our hands or feet or shortness of breath while lying flat in bed. Please call your doctor as soon as you notice any of these symptoms; do not wait until your next office visit. The discharge information has been reviewed with the patient. The patient verbalized understanding. Discharge medications reviewed with the patient and appropriate educational materials and side effects teaching were provided. Patient armband removed and shredded.

## 2020-01-14 NOTE — PROGRESS NOTES
20:20- Assisted patient to sit up higher in the bed- noted some shortness of breath ; patient reports that she has felt like that during the day. O2 sat 91-92% on room air. Ls; Slight exp wheezing   20:45- O2   @ 2  lpm nasal cannula applied. O2 sat=95% Assessment completed- see flow sheet. Bedside commode used as patient short of breath with exertion. 1/14/2020  01:15- Neb treatment given at this time for co dyspnea-HOB elevated. 01:40- Reports of feeling somewhat better than earlier. Continue to monitor-call light in reach. 06:00- Patient resting quietly- no c/o at this time. Continue to monitor. Call light in reach. 07:45- Report to oncoming nurse, Lemuel Gilliland RN.

## 2020-01-14 NOTE — PROGRESS NOTES
Pulse oximetry assessment - 6 min walk test    99% at rest on room air (if 88% or less, skip next steps)    95% - 97% while ambulating on room air    100% at rest on 2LPM

## 2020-01-14 NOTE — PROGRESS NOTES
Problem: Discharge Planning  Goal: *Discharge to safe environment  Outcome: Resolved/Met      Home    Noted orders for discharge, no services ordered, noted that pt had refused home health. Available as needed. Jackie LiconaRN,ext [id number]. Care Management Interventions  PCP Verified by CM: Yes(PCP left practice)  Last Visit to PCP: 06/26/19  Palliative Care Criteria Met (RRAT>21 & CHF Dx)?: No  Mode of Transport at Discharge:  Other (see comment)(family)  Transition of Care Consult (CM Consult): Discharge Planning  Physical Therapy Consult: No  Occupational Therapy Consult: No  Speech Therapy Consult: No  Current Support Network: Lives Alone  Confirm Follow Up Transport: Family  Discharge Location  Discharge Placement: Home(pt refused Home health)

## 2020-01-14 NOTE — DISCHARGE SUMMARY
Discharge Summary    Patient: Fidencio Paulino               Sex: female          DOA: 1/11/2020         YOB: 1933      Age:  80 y.o.        LOS:  LOS: 2 days                Admit Date: 1/11/2020    Discharge Date: 1/14/2020    Discharge Medications:     Current Discharge Medication List      START taking these medications    Details   levoFLOXacin (LEVAQUIN) 750 mg tablet Take 1 Tab by mouth daily. Qty: 5 Tab, Refills: 0         CONTINUE these medications which have NOT CHANGED    Details   famotidine (PEPCID) 20 mg tablet Take 20 mg by mouth nightly. amLODIPine (NORVASC) 10 mg tablet TAKE 1 TABLET BY MOUTH ONCE DAILY  Qty: 90 Tab, Refills: 0      atenolol (TENORMIN) 50 mg tablet TAKE ONE TABLET BY MOUTH TWICE DAILY  Qty: 180 Tab, Refills: 1      clotrimazole (LOTRIMIN) 1 % topical cream Apply  to affected area two (2) times a day. Qty: 15 g, Refills: 2      insulin NPH (HUMULIN N NPH U-100 INSULIN) 100 unit/mL injection ADMINISTER 22 UNITS UNDER THE SKIN EVERY MORNING THEN ADMINISTER 12 UNITS UNDER THE SKIN EVERY EVENING  Qty: 20 mL, Refills: 2      insulin lispro (HUMALOG U-100 INSULIN) 100 unit/mL injection INJECT 5 UNITS UNDER THE SKIN BEFORE BREAKFAST. DISCARD AFTER 28 DAYS OF OPENING  Qty: 10 mL, Refills: 2      hydrALAZINE (APRESOLINE) 100 mg tablet TAKE 1 TABLET BY MOUTH THREE TIMES DAILY  Qty: 270 Tab, Refills: 5    Comments: **Patient requests 90 days supply**      clotrimazole-betamethasone (LOTRISONE) topical cream AAA BID  Qty: 15 g, Refills: 0      losartan-hydroCHLOROthiazide (HYZAAR) 100-25 mg per tablet Take 1 Tab by mouth daily.   Qty: 90 Tab, Refills: 5    Associated Diagnoses: Essential hypertension      ferrous sulfate 325 mg (65 mg iron) tablet TK 1 T PO D  Refills: 12      Blood-Glucose Meter monitoring kit Use as directed to test blood glucose TID  Qty: 1 Kit, Refills: 0    Comments: Brand per insurance  Associated Diagnoses: Type 2 diabetes mellitus with nephropathy ([facility]      glucose blood VI test strips (ASCENSIA AUTODISC VI, ONE TOUCH ULTRA TEST VI) strip Use as directed to test blood glucose TID  Qty: 300 Strip, Refills: 3    Comments: Brand per insurance  Associated Diagnoses: Type 2 diabetes mellitus with nephropathy (HCC)      Lancets misc Use as directed to test blood glucose TID  Qty: 300 Each, Refills: 3    Comments: Brand per insurance  Associated Diagnoses: Type 2 diabetes mellitus with nephropathy (HCC)      simvastatin (ZOCOR) 20 mg tablet TAKE 1 TABLET BY MOUTH EVERY OTHER DAY  Qty: 45 Tab, Refills: 3      betamethasone dipropionate (DIPROSONE) 0.05 % topical cream Apply  to affected area two (2) times a day. Qty: 45 g, Refills: 1             Follow-up: PCP    Discharge Condition: Stable    Activity: Activity as tolerated    Diet: Resume previous diet    Labs:  Labs: Results:       Chemistry Recent Labs     01/11/20 2108   GLU 87      K 3.9      CO2 22   BUN 30*   CREA 1.66*   CA 8.2*   AGAP 5   BUCR 18   *   TP 6.4   ALB 3.0*   GLOB 3.4   AGRAT 0.9      CBC w/Diff Recent Labs     01/11/20 2108   WBC 9.5   RBC 3.56*   HGB 8.6*   HCT 27.8*      GRANS 69   LYMPH 19*   EOS 4      Cardiac Enzymes No results for input(s): CPK, CKND1, BRANDON in the last 72 hours. No lab exists for component: CKRMB, TROIP   Coagulation Recent Labs     01/11/20 2108   PTP 14.0   INR 1.1       Lipid Panel Lab Results   Component Value Date/Time    Cholesterol, total 146 06/26/2019 01:48 PM    HDL Cholesterol 49 06/26/2019 01:48 PM    LDL, calculated 80.8 06/26/2019 01:48 PM    VLDL, calculated 16.2 06/26/2019 01:48 PM    Triglyceride 81 06/26/2019 01:48 PM    CHOL/HDL Ratio 3.0 06/26/2019 01:48 PM      BNP No results for input(s): BNPP in the last 72 hours.    Liver Enzymes Recent Labs     01/11/20 2108   TP 6.4   ALB 3.0*   *   SGOT 16      Thyroid Studies Lab Results   Component Value Date/Time    T4, Total 9.2 09/19/2012 08:59 AM    T3 Uptake 31 09/19/2012 08:59 AM    TSH 1.20 06/26/2019 01:48 PM          Imaging:    Ct Abd Pelv Wo Cont    Result Date: 1/12/2020  EXAM: CT ABDOMEN AND PELVIS CLINICAL INDICATION/HISTORY:  Abdominal pain. . -Additional: None COMPARISON: 8/24/2016 TECHNIQUE: Axial CT imaging of the abdomen and pelvis was performed without intravenous contrast. Multiplanar reformats were generated. One or more dose reduction techniques were used on this CT: automated exposure control, adjustment of the mAs and/or kVp according to patient size, and iterative reconstruction techniques. The specific techniques used on this CT exam have been documented in the patient's electronic medical record. Digital Imaging and Communications in Medicine (DICOM) format image data are available to nonaffiliated external healthcare facilities or entities on a secure, media free, reciprocally searchable basis with patient authorization for at least a 12-month period after this study. _______________ FINDINGS: LOWER CHEST: Bronchial wall thickening in right lower lobe with mild basilar groundglass/consolidation and small adjacent effusion. Coronary calcifications. Decreased density of mediastinal blood pool is indicative of anemia. LIVER, BILIARY: Liver is normal. No biliary dilation. Cholelithiasis. SPLEEN: Normal. PANCREAS: Normal. ADRENALS: Bilateral adrenal hyperplasia similar to prior. KIDNEYS/URETERS/BLADDER: Slightly hyperdense presumed cyst exophytic from the posterior margin of the right kidney, size unchanged since 8/24/2016. LYMPH NODES: No enlarged lymph nodes. GASTROINTESTINAL TRACT: No bowel dilation or wall thickening. Colonic diverticulosis is present, without evidence of acute inflammation. VASCULATURE: Severe atherosclerotic calcifications. PELVIC ORGANS: Unremarkable. BONES: No acute or aggressive osseous abnormalities identified. Degenerative disc and facet changes in the lumbar spine.  Subtly increased sclerosis of the L2 vertebra, appearance suggestive of Paget's. OTHER: None. _______________     IMPRESSION: 1. No specific findings to explain abdominal pain symptoms. 2. Mild right lower lobe groundglass/consolidation likely atelectasis, though could potentially be infectious or related to aspiration given bronchial wall thickening. 3. Posteriorly exophytic lesion of the right kidney is likely a hyperdense cyst, stable in size since 2016 and presumed benign. Preliminary resident report was reviewed. There is no significant discrepancy. Xr Chest Port    Result Date: 1/12/2020  EXAM: FRONTAL CHEST RADIOGRAPH CLINICAL INDICATION/HISTORY: Upper abdominal pain COMPARISON: None TECHNIQUE: Frontal view of the chest _______________ FINDINGS: HEART AND MEDIASTINUM: Mildly enlarged cardiac silhouette. Aortic arch calcifications. LUNGS AND PLEURAL SPACES: No visible pleural abnormality. There is some haziness overlying the lower right thorax which is favored to be artifactual from overlying soft tissue. Mild scattered subsegmental atelectasis. BONY THORAX AND SOFT TISSUES: Unremarkable. _______________     IMPRESSION: No definite acute process. Haziness of lower right thorax favored to be artifactual from overlying soft tissues.        Consults: None    Treatment Team: Treatment Team: Attending Provider: Thomas Pugh MD; Care Manager: Camille Kuhn; Primary Nurse: Bunny Fernandez RN    Significant Diagnostic Studies: labs:   Recent Results (from the past 24 hour(s))   ECHO ADULT COMPLETE    Collection Time: 01/13/20  2:00 PM   Result Value Ref Range    AO ASC D 2.76 cm    LVIDd 5.32 (A) 3.9 - 5.3 cm    LVPWd 0.80 0.6 - 0.9 cm    LVIDs 3.34 cm    IVSd 0.96 (A) 0.6 - 0.9 cm    LV ED Vol A2C 111.1 mL    LV ES Vol A4C 17.3 mL    LV ES Vol BP 23.6 19 - 49 mL    LVOT d 1.75 cm    LVOT Peak Velocity 103.77 cm/s    LVOT Peak Gradient 4.3 mmHg    LVOT VTI 28.18 cm    MV A Fredy 0.00 cm/s    MV E Fredy 0.00 cm/s    MV Mean Gradient 4.9 mmHg    Mitral Valve Annulus Velocity Time Integral 64.69 cm    Left Atrium to Aortic Root Ratio 1.43     Inferior Vena Cava Diameter Sniffing 2.68 cm    LV Mass .6 (A) 67 - 162 g    LV Mass AL Index 113.1 (A) 43 - 95 g/m2    RVSP 82.3 mmHg    MV Peak Gradient 15.2 mmHg    LV ES Vol A2C 31.9 mL    LVES Vol Index BP 13.4 mL/m2    LV ED Vol A4C 108.2 mL    LVED Vol Index BP 62.3 mL/m2    Est. RA Pressure 8.0 mmHg    Mitral Valve E Wave Deceleration Time 0.0 ms    Mitral Valve Pressure Half-time 0.0 ms    Right Atrium Minor Axis 3.29 cm    Left Atrium Major Axis 3.79 cm    Triscuspid Valve Regurgitation Peak Gradient 74.3 mmHg    Mitral Valve Max Velocity 194.88 cm/s    MVA VTI 1.0 cm2    LVOT SV 67.5 ml    LV ED Vol .0 (A) 56 - 104 ml    TR Max Velocity 431.03 cm/s    PASP 75.0 mmHg    LVED Vol Index A4C 61.3 mL/m2    LVED Vol Index A2C 62.9 mL/m2    LVES Vol Index A4C 9.8 mL/m2    LVES Vol Index A2C 18.1 mL/m2    Ao Root D 2.65 cm    Left Ventricular Fractional Shortening by 2D 86.600505975 %    Left Ventricular Outflow Tract Mean Gradient 9.0891977135 mmHg    Left Ventricular Outflow Tract Mean Velocity 2.07112071545 cm/s    Mitral Valve Deceleration Tripp 0     Mitral valve mean inflow velocity 6.059553547 m/s    Left Ventricular End Diastolic Volume by Teichholz Method 5.96070648854 mL    Left Ventricular End Systolic Volume by Teichholz Method 0.675707577122 mL    Left Ventricular Stroke Volume by 2-D Biplane-MOD 16.144925229 mL    Left Ventricular Stroke Volume by 2-D Single Plane- MOD 79.907047097 mL    Left Ventricular Stroke Volume by Teichholz Method 27.971885133 mL    Left Ventricular Stroke Volume by 2-D Single Plane- MOD 83.244130593 mL   GLUCOSE, POC    Collection Time: 01/13/20  5:21 PM   Result Value Ref Range    Glucose (POC) 131 (H) 70 - 110 mg/dL   GLUCOSE, POC    Collection Time: 01/14/20  8:50 AM   Result Value Ref Range    Glucose (POC) 151 (H) 70 - 110 mg/dL   GLUCOSE, POC    Collection Time: 01/14/20 12:56 PM   Result Value Ref Range    Glucose (POC) 102 70 - 110 mg/dL         Discharge diagnoses:    Problem List as of 1/14/2020 Date Reviewed: 1/12/2020          Codes Class Noted - Resolved    * (Principal) Community acquired pneumonia ICD-10-CM: J18.9  ICD-9-CM: 486  1/12/2020 - Present        Tobacco abuse ICD-10-CM: Z72.0  ICD-9-CM: 305.1  1/12/2020 - Present        Spinal stenosis at L4-L5 level ICD-10-CM: M48.061  ICD-9-CM: 724.02  1/12/2020 - Present        Type 2 diabetes mellitus with nephropathy ([facility] ICD-10-CM: E11.21  ICD-9-CM: 250.40, 583.81  1/25/2018 - Present        Essential hypertension ICD-10-CM: I10  ICD-9-CM: 401.9  10/29/2015 - Present        CKD (chronic kidney disease) stage 3, GFR 30-59 ml/min ([facility]) ICD-10-CM: N18.3  ICD-9-CM: 585.3  11/20/2012 - Present        Mastitis ICD-10-CM: N61.0  ICD-9-CM: 611.0  11/20/2012 - Present        Hypertension ICD-10-CM: I10  ICD-9-CM: 401.9  2/16/2012 - Present        Hyperlipidemia ICD-10-CM: E78.5  ICD-9-CM: 272.4  2/16/2012 - Present        DJD (degenerative joint disease), multiple sites ICD-10-CM: M15.9  ICD-9-CM: 715.89  2/16/2012 - Present        CKD (chronic kidney disease) stage 2, GFR 60-89 ml/min ICD-10-CM: N18.2  ICD-9-CM: 585.2  2/16/2012 - Present        Anemia ICD-10-CM: D64.9  ICD-9-CM: 285.9  2/16/2012 - Present        RESOLVED: Type 2 diabetes mellitus without complication ([facility] [id number]: E11.9  ICD-9-CM: 250.00  10/29/2015 - 1/25/2018        RESOLVED: Diabetes mellitus ([facility] ICD-10-CM: E11.9  ICD-9-CM: 250.00  2/16/2012 - 1/25/2018            Hospital Course:   Major issues addressed during hospitalization outlined  below. Nathan Bergman is a 80 y.o. -American female who presents for complaint of Abdominal Pain. Patient states that she has had an 8-9/10 intensity \"ache\" in her epigastrium that \"just sits there\" that is constant, non-exertional, and improved with Maalox.   Patient also states that she has been having a dry cough, wheeze, fatigue, swelling under her right eye, chills, nausea without vomiting, and dyspnea on exertion. Patient also reports intermittent, chronic diarrhea. Patient denies shortness of breath, vomiting, fever, pain with inspiration, and dysuria. Patient states that she has not been hospitalized overnight in the last 90 days.     In [facility] ER, Patient was noted to have a Pneumonia of her Right lung base.     Patient is admitted to [facility] Telemetry Unit for Community-Acquired Pneumonia. Patient treated with IV abx then switched on discharge to levaquin PO to complete 7 days total.  Walk test was done before dc to ensure patient did not require home 02. Patient was safe for dc to home 1/14 with pcp f/u in 1 week.     Demetria Bergman MD  January 14, 2020        Total time spent 35 minutes

## 2020-01-15 ENCOUNTER — PATIENT OUTREACH (OUTPATIENT)
Dept: FAMILY MEDICINE CLINIC | Age: 85
End: 2020-01-15

## 2020-01-15 NOTE — PROGRESS NOTES
Hospital Discharge Follow-Up      Date/Time:  1/15/2020 9:50 AM      Patient was admitted to [facility] on 20 and discharged on 20 for Community-Acquired Pneumonia. The physician discharge summary was not available at the time of outreach. Patient was contacted within 1 business day of discharge. Top Challenges reviewed with the provider   None noted at this time. Advance Care Planning:   Does patient have an Advance Directive:  not on file          Care Transition Nurse (CTN) contacted the patient by telephone to perform post hospital discharge assessment. Verified name and  with patient as identifiers. Provided introduction to self, and explanation of the CTN role. Patient states that she is doing well. Patient denied CP, SOB, fever, chills, nausea and vomiting. Patient states that she has good support from family. Patient states that she is independent. Strongly declined home health. Patient states that she has her antibiotic and taking it. Patient reported that she is independent with ADLs. Patient aware of her upcomign appt with Dr. Flakita Collins. No questions, concerns, needs and/or assistance at this time as per Pt. Patient states that she is just now eating her breakfast and given CTN few questions to ask her. Pt. thanked us for follow up call and ended the call. Patient received hospital discharge instructions. CTN reviewed discharge instructions and red flags with patient who verbalized understanding. Patient given an opportunity to ask questions and does not have any further questions or concerns at this time. The patient agrees to contact the PCP office for questions related to their healthcare. CTN provided contact information for future reference. Disease Specific:   Pneumonia     Patients top risk factors for readmission: none noted at this time.         Home Health orders at discharge: patient refused      Durable Medical Equipment ordered at discharge: none noted and none as per Pt. [facility] follow up appointment(s):   Future Appointments   Date Time Provider Oisto Recio   1/20/2020  2:00 PM MD LIANG Paul SCHED        Goals      Prevent complications post hospitalization. Patient will continue to take her antibiotic. Patient will attend follow up appointment with Dr. Fareed Persaud on 1/20/20. Patient will call CTN and/or PCP office for any questions and/or concerns. Patient will continue to take all medications as prescribed.  Understands red flags post discharge. Patient will go to the nearest emergency room for chest pain, shortness of breath, returning of symptoms that brought her to the emergency room and/or worsening of symptoms. Patient will contact PCP office for any questions or concerns related to healthcare. Patient kept the conversation short and ended the call.

## 2020-01-30 ENCOUNTER — HOSPITAL ENCOUNTER (EMERGENCY)
Age: 85
Discharge: HOME | End: 2020-01-30
Attending: EMERGENCY MEDICINE | Admitting: EMERGENCY MEDICINE
Payer: MEDICARE

## 2020-01-30 ENCOUNTER — APPOINTMENT (OUTPATIENT)
Dept: GENERAL RADIOLOGY | Age: 85
End: 2020-01-30
Attending: EMERGENCY MEDICINE
Payer: MEDICARE

## 2020-01-30 VITALS
HEIGHT: 65 IN | RESPIRATION RATE: 16 BRPM | SYSTOLIC BLOOD PRESSURE: 152 MMHG | OXYGEN SATURATION: 97 % | WEIGHT: 153 LBS | TEMPERATURE: 98.8 F | DIASTOLIC BLOOD PRESSURE: 51 MMHG | BODY MASS INDEX: 25.49 KG/M2 | HEART RATE: 59 BPM

## 2020-01-30 DIAGNOSIS — R22.1 THROAT SWELLING: Primary | ICD-10-CM

## 2020-01-30 DIAGNOSIS — D64.9 ANEMIA, UNSPECIFIED TYPE: ICD-10-CM

## 2020-01-30 LAB
ALBUMIN SERPL-MCNC: 3 G/DL (ref 3.4–5)
ALBUMIN/GLOB SERPL: 0.8 {RATIO} (ref 0.8–1.7)
ALP SERPL-CCNC: 149 U/L (ref 45–117)
ALT SERPL-CCNC: 19 U/L (ref 13–56)
ANION GAP SERPL CALC-SCNC: 9 MMOL/L (ref 3–18)
AST SERPL-CCNC: 20 U/L (ref 10–38)
ATRIAL RATE: 58 BPM
BASOPHILS # BLD: 0 K/UL (ref 0–0.1)
BASOPHILS NFR BLD: 0 % (ref 0–2)
BILIRUB SERPL-MCNC: 1.2 MG/DL (ref 0.2–1)
BUN SERPL-MCNC: 23 MG/DL (ref 7–18)
BUN/CREAT SERPL: 13 (ref 12–20)
CALCIUM SERPL-MCNC: 8.6 MG/DL (ref 8.5–10.1)
CALCULATED P AXIS, ECG09: 78 DEGREES
CALCULATED R AXIS, ECG10: 18 DEGREES
CALCULATED T AXIS, ECG11: 54 DEGREES
CHLORIDE SERPL-SCNC: 106 MMOL/L (ref 100–111)
CO2 SERPL-SCNC: 22 MMOL/L (ref 21–32)
CREAT SERPL-MCNC: 1.73 MG/DL (ref 0.6–1.3)
DIAGNOSIS, 93000: NORMAL
DIFFERENTIAL METHOD BLD: ABNORMAL
EOSINOPHIL # BLD: 0.4 K/UL (ref 0–0.4)
EOSINOPHIL NFR BLD: 4 % (ref 0–5)
ERYTHROCYTE [DISTWIDTH] IN BLOOD BY AUTOMATED COUNT: 20.1 % (ref 11.6–14.5)
GLOBULIN SER CALC-MCNC: 3.9 G/DL (ref 2–4)
GLUCOSE SERPL-MCNC: 103 MG/DL (ref 74–99)
HCT VFR BLD AUTO: 24.8 % (ref 35–45)
HGB BLD-MCNC: 8 G/DL (ref 12–16)
LYMPHOCYTES # BLD: 1.1 K/UL (ref 0.9–3.6)
LYMPHOCYTES NFR BLD: 13 % (ref 21–52)
MCH RBC QN AUTO: 24.2 PG (ref 24–34)
MCHC RBC AUTO-ENTMCNC: 32.3 G/DL (ref 31–37)
MCV RBC AUTO: 75.2 FL (ref 74–97)
MONOCYTES # BLD: 0.8 K/UL (ref 0.05–1.2)
MONOCYTES NFR BLD: 9 % (ref 3–10)
NEUTS SEG # BLD: 6.2 K/UL (ref 1.8–8)
NEUTS SEG NFR BLD: 74 % (ref 40–73)
P-R INTERVAL, ECG05: 136 MS
PLATELET # BLD AUTO: 216 K/UL (ref 135–420)
POTASSIUM SERPL-SCNC: 3.9 MMOL/L (ref 3.5–5.5)
PROT SERPL-MCNC: 6.9 G/DL (ref 6.4–8.2)
Q-T INTERVAL, ECG07: 472 MS
QRS DURATION, ECG06: 82 MS
QTC CALCULATION (BEZET), ECG08: 463 MS
RBC # BLD AUTO: 3.3 M/UL (ref 4.2–5.3)
SODIUM SERPL-SCNC: 137 MMOL/L (ref 136–145)
VENTRICULAR RATE, ECG03: 58 BPM
WBC # BLD AUTO: 8.5 K/UL (ref 4.6–13.2)

## 2020-01-30 PROCEDURE — 85025 COMPLETE CBC W/AUTO DIFF WBC: CPT

## 2020-01-30 PROCEDURE — 80053 COMPREHEN METABOLIC PANEL: CPT

## 2020-01-30 PROCEDURE — 71045 X-RAY EXAM CHEST 1 VIEW: CPT

## 2020-01-30 PROCEDURE — 93005 ELECTROCARDIOGRAM TRACING: CPT

## 2020-01-30 PROCEDURE — 99285 EMERGENCY DEPT VISIT HI MDM: CPT

## 2020-01-30 RX ORDER — CLONAZEPAM 0.5 MG/1
0.5 TABLET ORAL
Qty: 6 TAB | Refills: 0 | Status: SHIPPED | OUTPATIENT
Start: 2020-01-30 | End: 2020-02-05

## 2020-01-30 NOTE — DISCHARGE INSTRUCTIONS

## 2020-01-30 NOTE — ED NOTES
I have reviewed discharge instructions with the patient. The patient verbalized understanding. Pt is A&OX4. Is ambulatory. All questions answered.

## 2020-01-30 NOTE — ED NOTES
Assumed care of pt. Pt is A&OX4. And appears in NAD. Pt is ambulatory. Breathing is spontaneous and unlabored. Equal chest rise/fall. Skin is warm and dry. Pt is on cardiac monitor. VVS. Sinus grace noted. Pt to be seen for SOB and feeling like \"my throat is closing. \" When asked to explain further she replied \"it just feels like the top in closing in on the bottom. \" Reports that she has this from time to time but this occurrence started this morning. The SOB started last week when she was here for pneumonia and hasn't gone away. Reports worsening SOB with exertion and when lying flat. Uses pillows to prop her up at night to sleep. Reports noticing her lower extremities and feet swelling. Denies CP.

## 2020-01-30 NOTE — ED PROVIDER NOTES
EMERGENCY DEPARTMENT HISTORY AND PHYSICAL EXAM    10:24 AM      Date: 1/30/2020  Patient Name: Kathi Rodriguez    History of Presenting Illness     Chief Complaint   Patient presents with    Shortness of Breath    Cough    Diarrhea    Nausea     History Provided By: patient    Additional History (Context): Kathi Rodriguez is a 80 y.o. female presents with recent treatment for pneumonia and completed 4 out of 5 days of antibiotics, comes in with several days of sensation of her throat closing. No cough, shortness of breath otherwise, she has had diarrhea for a few days since starting the antibiotic. No abdominal pain or chest pain. No fevers. Endy Richard PCP: Ying Funes MD    Chief Complaint:   Duration:    Timing:    Location:   Quality:   Severity:   Modifying Factors:   Associated Symptoms:       Current Outpatient Medications   Medication Sig Dispense Refill    clonazePAM (KLONOPIN) 0.5 mg tablet Take 1 Tab by mouth two (2) times daily as needed for Anxiety for up to 6 days. Max Daily Amount: 1 mg. 6 Tab 0    famotidine (PEPCID) 20 mg tablet Take 20 mg by mouth nightly.  amLODIPine (NORVASC) 10 mg tablet TAKE 1 TABLET BY MOUTH ONCE DAILY 90 Tab 0    atenolol (TENORMIN) 50 mg tablet TAKE ONE TABLET BY MOUTH TWICE DAILY 180 Tab 1    insulin NPH (HUMULIN N NPH U-100 INSULIN) 100 unit/mL injection ADMINISTER 22 UNITS UNDER THE SKIN EVERY MORNING THEN ADMINISTER 12 UNITS UNDER THE SKIN EVERY EVENING 20 mL 2    insulin lispro (HUMALOG U-100 INSULIN) 100 unit/mL injection INJECT 5 UNITS UNDER THE SKIN BEFORE BREAKFAST. DISCARD AFTER 28 DAYS OF OPENING 10 mL 2    hydrALAZINE (APRESOLINE) 100 mg tablet TAKE 1 TABLET BY MOUTH THREE TIMES DAILY 270 Tab 5    simvastatin (ZOCOR) 20 mg tablet TAKE 1 TABLET BY MOUTH EVERY OTHER DAY 45 Tab 3    betamethasone dipropionate (DIPROSONE) 0.05 % topical cream Apply  to affected area two (2) times a day.  45 g 1    levoFLOXacin (LEVAQUIN) 750 mg tablet Take 1 Tab by mouth daily. 5 Tab 0    clotrimazole (LOTRIMIN) 1 % topical cream Apply  to affected area two (2) times a day. 15 g 2    clotrimazole-betamethasone (LOTRISONE) topical cream AAA BID 15 g 0    losartan-hydroCHLOROthiazide (HYZAAR) 100-25 mg per tablet Take 1 Tab by mouth daily. 90 Tab 5    ferrous sulfate 325 mg (65 mg iron) tablet TK 1 T PO D  12    Blood-Glucose Meter monitoring kit Use as directed to test blood glucose TID 1 Kit 0    glucose blood VI test strips (ASCENSIA AUTODISC VI, ONE TOUCH ULTRA TEST VI) strip Use as directed to test blood glucose  Strip 3    Lancets misc Use as directed to test blood glucose  Each 3       Past History     Past Medical History:  Past Medical History:   Diagnosis Date    Anemia     Central stenosis of spinal canal     Severe: L4-L5; Moderate: L3-L4    Cholelithiasis     CKD (chronic kidney disease), stage III (HCC)     Diabetes mellitus, type 2 ([facility] [address]     HbA1c 6.1% (6/26/2019)    Elevated PTHrP level     x4 measurements    Hiatal hernia     Small    HLD (hyperlipidemia)     Hypertension     Tobacco abuse        Past Surgical History:  Past Surgical History:   Procedure Laterality Date    HX APPENDECTOMY      HX CATARACT REMOVAL Bilateral     HX RETINAL DETACHMENT REPAIR Right 8/27/15       Family History:  Family History   Problem Relation Age of Onset    Cancer Mother         Lung Cancer    No Known Problems Sister     Liver Disease Son         Hepatitis    No Known Problems Sister        Social History:  Social History     Tobacco Use    Smoking status: Former Smoker     Packs/day: 0.50     Years: 40.00     Pack years: 20.00    Smokeless tobacco: Former User    Tobacco comment: 0.5 PPD x40+ years; Cutback to 1/4 PPD currently   Substance Use Topics    Alcohol use: No    Drug use: No       Allergies:   Allergies   Allergen Reactions    Aspirin Nausea Only    Bactrim [Sulfamethoprim] Nausea Only     And dizziness and loss of appetite    Clindamycin Other (comments)     Tongue pain and metallic taste    Pcn [Penicillins] Nausea and Vomiting and Hives    Sulfamethoxazole-Trimethoprim Itching    Tetanus Toxoid,Fluid Hives    Tetracycline Nausea Only    Tetracyclines Other (comments)     Upset stomach    Tramadol Other (comments)     Nausea and vomiting         Review of Systems     Review of Systems   Constitutional: Negative for diaphoresis and fever. HENT: Negative for congestion and sore throat. Eyes: Negative for pain and itching. Respiratory: Positive for shortness of breath. Negative for cough. Cardiovascular: Negative for chest pain and palpitations. Gastrointestinal: Negative for abdominal pain and diarrhea. Endocrine: Negative for polydipsia and polyuria. Genitourinary: Negative for dysuria and hematuria. Musculoskeletal: Negative for arthralgias and myalgias. Skin: Negative for rash and wound. Neurological: Negative for seizures and syncope. Hematological: Does not bruise/bleed easily. Psychiatric/Behavioral: Negative for agitation and hallucinations. Physical Exam       Patient Vitals for the past 12 hrs:   Temp Pulse Resp BP SpO2   01/30/20 0940     99 %   01/30/20 0914 98.8 °F (37.1 °C) (!) 54 22 179/57 96 %       Physical Exam  Vitals signs and nursing note reviewed. Constitutional:       General: She is not in acute distress. Appearance: She is well-developed. She is not ill-appearing. HENT:      Head: Normocephalic and atraumatic. Eyes:      General: No scleral icterus. Conjunctiva/sclera: Conjunctivae normal.   Neck:      Musculoskeletal: Normal range of motion and neck supple. Vascular: No JVD. Cardiovascular:      Rate and Rhythm: Normal rate and regular rhythm. Heart sounds: Normal heart sounds.       Comments: 4 intact extremity pulses  Pulmonary:      Effort: Pulmonary effort is normal.      Breath sounds: Examination of the right-lower field reveals rhonchi. Rhonchi present. Comments: Has some expiratory stridor very mild which extinguishes with phonation and with extension of her neck opening mouth and sticking out tongue for exam.  There is no lower airway wheezing  Abdominal:      Palpations: Abdomen is soft. There is no mass. Tenderness: There is no abdominal tenderness. Musculoskeletal: Normal range of motion. Lymphadenopathy:      Cervical: No cervical adenopathy. Skin:     General: Skin is warm and dry. Neurological:      Mental Status: She is alert. Diagnostic Study Results   Labs -  Recent Results (from the past 12 hour(s))   EKG, 12 LEAD, INITIAL    Collection Time: 01/30/20  9:23 AM   Result Value Ref Range    Ventricular Rate 58 BPM    Atrial Rate 58 BPM    P-R Interval 136 ms    QRS Duration 82 ms    Q-T Interval 472 ms    QTC Calculation (Bezet) 463 ms    Calculated P Axis 78 degrees    Calculated R Axis 18 degrees    Calculated T Axis 54 degrees    Diagnosis       Sinus bradycardia  Left atrial enlargement  Borderline ECG  When compared with ECG of 11-JAN-2020 21:40,  No significant change was found  Confirmed by Linda Murguia ([id number]) on 1/30/2020 11:35:32 AM     CBC WITH AUTOMATED DIFF    Collection Time: 01/30/20 10:00 AM   Result Value Ref Range    WBC 8.5 4.6 - 13.2 K/uL    RBC 3.30 (L) 4.20 - 5.30 M/uL    HGB 8.0 (L) 12.0 - 16.0 g/dL    HCT 24.8 (L) 35.0 - 45.0 %    MCV 75.2 74.0 - 97.0 FL    MCH 24.2 24.0 - 34.0 PG    MCHC 32.3 31.0 - 37.0 g/dL    RDW 20.1 (H) 11.6 - 14.5 %    PLATELET 448 669 - 145 K/uL    NEUTROPHILS 74 (H) 40 - 73 %    LYMPHOCYTES 13 (L) 21 - 52 %    MONOCYTES 9 3 - 10 %    EOSINOPHILS 4 0 - 5 %    BASOPHILS 0 0 - 2 %    ABS. NEUTROPHILS 6.2 1.8 - 8.0 K/UL    ABS. LYMPHOCYTES 1.1 0.9 - 3.6 K/UL    ABS. MONOCYTES 0.8 0.05 - 1.2 K/UL    ABS. EOSINOPHILS 0.4 0.0 - 0.4 K/UL    ABS.  BASOPHILS 0.0 0.0 - 0.1 K/UL    DF AUTOMATED     METABOLIC PANEL, COMPREHENSIVE    Collection Time: 01/30/20 10:00 AM   Result Value Ref Range    Sodium 137 136 - 145 mmol/L    Potassium 3.9 3.5 - 5.5 mmol/L    Chloride 106 100 - 111 mmol/L    CO2 22 21 - 32 mmol/L    Anion gap 9 3.0 - 18 mmol/L    Glucose 103 (H) 74 - 99 mg/dL    BUN 23 (H) 7.0 - 18 MG/DL    Creatinine 1.73 (H) 0.6 - 1.3 MG/DL    BUN/Creatinine ratio 13 12 - 20      GFR est AA 34 (L) >60 ml/min/1.73m2    GFR est non-AA 28 (L) >60 ml/min/1.73m2    Calcium 8.6 8.5 - 10.1 MG/DL    Bilirubin, total 1.2 (H) 0.2 - 1.0 MG/DL    ALT (SGPT) 19 13 - 56 U/L    AST (SGOT) 20 10 - 38 U/L    Alk. phosphatase 149 (H) 45 - 117 U/L    Protein, total 6.9 6.4 - 8.2 g/dL    Albumin 3.0 (L) 3.4 - 5.0 g/dL    Globulin 3.9 2.0 - 4.0 g/dL    A-G Ratio 0.8 0.8 - 1.7         Radiologic Studies -   XR CHEST PORT   Final Result   IMPRESSION:      Cardiomegaly, small bilateral pleural effusions and findings suggestive of   edema, correlate clinically to exclude infiltrate. There may be small component   of loculated left effusion. Xr Chest Port    Result Date: 1/30/2020  Chest AP portable INDICATION: Shortness of breath. COMPARISON: X-ray 01/11/2020. FINDINGS: Enlarged cardiac silhouette without appreciable interval change. There is slight prominence of the central pulmonary vessels, stable. There is interval mild interstitial hazy opacities in the lower lung zones. Slight blunting of bilateral costophrenic angles and haziness in the lower lung zones. Apices are clear without pneumothorax. Slight left subcostal opacity, possible small loculated effusion. IMPRESSION: Cardiomegaly, small bilateral pleural effusions and findings suggestive of edema, correlate clinically to exclude infiltrate. There may be small component of loculated left effusion. Medications ordered:   Medications - No data to display      Medical Decision Making   Initial Medical Decision Making and DDx:     Most consistent with psychogenic laryngeal spasm.   Her symptoms extinguish when she is phonating and does not have change in her voice. She appears nontoxic. This been going on for about 5 days without a bad outcome. Do not suspect recurrence of her pneumonia. I reviewed portable view of the chest has persistent right lower lobe haziness consistent with an infiltrate, improved compared to prior with reduced silhouetting of the cardiac border. Clinically I do not think she has worsening pneumonia    ED Course: Progress Notes, Reevaluation, and Consults:     12:47 PM Pt reevaluated at this time. Discussed results and findings, as well as, diagnosis and plan for discharge. Follow up with doctors/services listed. Return to the emergency department for alarming symptoms. Pt verbalizes understanding and agreement with plan. All questions addressed. No alarming findings on work-up, suspect pneumonia is resolving and the patient does not appear toxic. No alarming events during a several hours of monitoring here. On reassessment her phonation has entirely resolved and she speaks in a normal voice. One possibility is that this represents anxiety so recommended a short course of Xanax, change to clonazepam due to insurance. Discussed that this was only in as needed medication and if there are significant side effects they can stop taking it and visit next week with her primary care doctor for reassessment. Questions answered and they are happy with the plan. I am the first provider for this patient. I reviewed the vital signs, available nursing notes, past medical history, past surgical history, family history and social history. Patient Vitals for the past 12 hrs:   Temp Pulse Resp BP SpO2   01/30/20 0940     99 %   01/30/20 0914 98.8 °F (37.1 °C) (!) 54 22 179/57 96 %       Vital Signs-Reviewed the patient's vital signs. Pulse Oximetry Analysis, Cardiac Monitor, 12 lead ekg:  Twelve-lead EKG at 923, sinus bradycardia at 50 a no acute process.   Telemetry sinus bradycardia 54, oximetry 96% on room air  Interpreted by the EP. Records Reviewed: Nursing notes reviewed (Time of Review: 10:24 AM)    Procedures:   Critical Care Time:   Aspirin: (was aspirin given for stroke?)    Diagnosis     Clinical Impression:   1. Throat swelling    2. Anemia, unspecified type        Disposition:       Follow-up Information     Follow up With Specialties Details Why Contact Info    Francisco Vazquez MD Internal Medicine In 3 days  [address] [id number]  [phone]             Patient's Medications   Start Taking    CLONAZEPAM (KLONOPIN) 0.5 MG TABLET    Take 1 Tab by mouth two (2) times daily as needed for Anxiety for up to 6 days. Max Daily Amount: 1 mg. Continue Taking    AMLODIPINE (NORVASC) 10 MG TABLET    TAKE 1 TABLET BY MOUTH ONCE DAILY    ATENOLOL (TENORMIN) 50 MG TABLET    TAKE ONE TABLET BY MOUTH TWICE DAILY    BETAMETHASONE DIPROPIONATE (DIPROSONE) 0.05 % TOPICAL CREAM    Apply  to affected area two (2) times a day. BLOOD-GLUCOSE METER MONITORING KIT    Use as directed to test blood glucose TID    CLOTRIMAZOLE (LOTRIMIN) 1 % TOPICAL CREAM    Apply  to affected area two (2) times a day. CLOTRIMAZOLE-BETAMETHASONE (LOTRISONE) TOPICAL CREAM    AAA BID    FAMOTIDINE (PEPCID) 20 MG TABLET    Take 20 mg by mouth nightly. FERROUS SULFATE 325 MG (65 MG IRON) TABLET    TK 1 T PO D    GLUCOSE BLOOD VI TEST STRIPS (ASCENSIA AUTODISC VI, ONE TOUCH ULTRA TEST VI) STRIP    Use as directed to test blood glucose TID    HYDRALAZINE (APRESOLINE) 100 MG TABLET    TAKE 1 TABLET BY MOUTH THREE TIMES DAILY    INSULIN LISPRO (HUMALOG U-100 INSULIN) 100 UNIT/ML INJECTION    INJECT 5 UNITS UNDER THE SKIN BEFORE BREAKFAST.  DISCARD AFTER 28 DAYS OF OPENING    INSULIN NPH (HUMULIN N NPH U-100 INSULIN) 100 UNIT/ML INJECTION    ADMINISTER 22 UNITS UNDER THE SKIN EVERY MORNING THEN ADMINISTER 12 UNITS UNDER THE SKIN EVERY EVENING    LANCETS MISC    Use as directed to test blood glucose TID    LEVOFLOXACIN (LEVAQUIN) 750 MG TABLET    Take 1 Tab by mouth daily. LOSARTAN-HYDROCHLOROTHIAZIDE (HYZAAR) 100-25 MG PER TABLET    Take 1 Tab by mouth daily.     SIMVASTATIN (ZOCOR) 20 MG TABLET    TAKE 1 TABLET BY MOUTH EVERY OTHER DAY   These Medications have changed    No medications on file   Stop Taking    No medications on file     _______________________________    Notes:    Veronica Parrish MD using Dragon dictation      _______________________________

## 2020-02-24 ENCOUNTER — HOSPITAL ENCOUNTER (OUTPATIENT)
Dept: LAB | Age: 85
Discharge: HOME | End: 2020-02-24
Payer: MEDICARE

## 2020-02-24 DIAGNOSIS — N18.30 CHRONIC KIDNEY DISEASE, STAGE III (MODERATE) (HCC): ICD-10-CM

## 2020-02-24 LAB
ANION GAP SERPL CALC-SCNC: 7 MMOL/L (ref 3–18)
BUN SERPL-MCNC: 29 MG/DL (ref 7–18)
BUN/CREAT SERPL: 14 (ref 12–20)
CALCIUM SERPL-MCNC: 9 MG/DL (ref 8.5–10.1)
CHLORIDE SERPL-SCNC: 101 MMOL/L (ref 100–111)
CO2 SERPL-SCNC: 26 MMOL/L (ref 21–32)
CREAT SERPL-MCNC: 2.02 MG/DL (ref 0.6–1.3)
GLUCOSE SERPL-MCNC: 119 MG/DL (ref 74–99)
POTASSIUM SERPL-SCNC: 3.6 MMOL/L (ref 3.5–5.5)
SODIUM SERPL-SCNC: 134 MMOL/L (ref 136–145)

## 2020-02-24 PROCEDURE — 36415 COLL VENOUS BLD VENIPUNCTURE: CPT

## 2020-02-24 PROCEDURE — 80048 BASIC METABOLIC PNL TOTAL CA: CPT

## 2020-02-25 ENCOUNTER — OFFICE VISIT (OUTPATIENT)
Dept: FAMILY MEDICINE CLINIC | Age: 85
End: 2020-02-25

## 2020-02-25 ENCOUNTER — HOSPITAL ENCOUNTER (OUTPATIENT)
Dept: LAB | Age: 85
Discharge: HOME | End: 2020-02-25
Payer: MEDICARE

## 2020-02-25 VITALS
RESPIRATION RATE: 16 BRPM | HEIGHT: 65 IN | DIASTOLIC BLOOD PRESSURE: 50 MMHG | TEMPERATURE: 96.9 F | SYSTOLIC BLOOD PRESSURE: 150 MMHG | WEIGHT: 144.4 LBS | OXYGEN SATURATION: 98 % | HEART RATE: 54 BPM | BODY MASS INDEX: 24.06 KG/M2

## 2020-02-25 DIAGNOSIS — I10 ESSENTIAL HYPERTENSION: ICD-10-CM

## 2020-02-25 DIAGNOSIS — E78.00 PURE HYPERCHOLESTEROLEMIA: Primary | ICD-10-CM

## 2020-02-25 DIAGNOSIS — E11.21 TYPE 2 DIABETES MELLITUS WITH NEPHROPATHY (HCC): ICD-10-CM

## 2020-02-25 DIAGNOSIS — N18.4 STAGE 4 CHRONIC KIDNEY DISEASE (HCC): ICD-10-CM

## 2020-02-25 DIAGNOSIS — I50.32 DIASTOLIC CHF, CHRONIC (HCC): ICD-10-CM

## 2020-02-25 DIAGNOSIS — E78.00 PURE HYPERCHOLESTEROLEMIA: ICD-10-CM

## 2020-02-25 LAB
APPEARANCE UR: CLEAR
BACTERIA URNS QL MICRO: ABNORMAL /HPF
BILIRUB UR QL: NEGATIVE
CHOLEST SERPL-MCNC: 155 MG/DL
COLOR UR: YELLOW
CREAT UR-MCNC: 71 MG/DL (ref 30–125)
EPITH CASTS URNS QL MICRO: ABNORMAL /LPF (ref 0–5)
GLUCOSE UR STRIP.AUTO-MCNC: NEGATIVE MG/DL
HDLC SERPL-MCNC: 40 MG/DL (ref 40–60)
HDLC SERPL: 3.9 {RATIO} (ref 0–5)
HGB UR QL STRIP: NEGATIVE
HYALINE CASTS URNS QL MICRO: ABNORMAL /LPF (ref 0–2)
KETONES UR QL STRIP.AUTO: NEGATIVE MG/DL
LDLC SERPL CALC-MCNC: 88 MG/DL (ref 0–100)
LEUKOCYTE ESTERASE UR QL STRIP.AUTO: NEGATIVE
LIPID PROFILE,FLP: NORMAL
MICROALBUMIN UR-MCNC: 75 MG/DL (ref 0–3)
MICROALBUMIN/CREAT UR-RTO: 1056 MG/G (ref 0–30)
NITRITE UR QL STRIP.AUTO: NEGATIVE
PH UR STRIP: 5 [PH] (ref 5–8)
PROT UR STRIP-MCNC: 100 MG/DL
RBC #/AREA URNS HPF: ABNORMAL /HPF (ref 0–5)
SP GR UR REFRACTOMETRY: 1.01 (ref 1–1.03)
TRIGL SERPL-MCNC: 135 MG/DL (ref ?–150)
UROBILINOGEN UR QL STRIP.AUTO: 0.2 EU/DL (ref 0.2–1)
VLDLC SERPL CALC-MCNC: 27 MG/DL
WBC URNS QL MICRO: ABNORMAL /HPF (ref 0–4)

## 2020-02-25 PROCEDURE — 80061 LIPID PANEL: CPT

## 2020-02-25 PROCEDURE — 81001 URINALYSIS AUTO W/SCOPE: CPT

## 2020-02-25 PROCEDURE — 36415 COLL VENOUS BLD VENIPUNCTURE: CPT

## 2020-02-25 PROCEDURE — 82043 UR ALBUMIN QUANTITATIVE: CPT

## 2020-02-25 PROCEDURE — 85025 COMPLETE CBC W/AUTO DIFF WBC: CPT

## 2020-02-25 RX ORDER — FUROSEMIDE 40 MG/1
40 TABLET ORAL
COMMUNITY
Start: 2020-02-10 | End: 2020-03-11 | Stop reason: SDUPTHER

## 2020-02-25 RX ORDER — ISOSORBIDE MONONITRATE 60 MG/1
60 TABLET, EXTENDED RELEASE ORAL
COMMUNITY
Start: 2020-02-10 | End: 2020-03-11 | Stop reason: SDUPTHER

## 2020-02-25 RX ORDER — MIRTAZAPINE 15 MG/1
15 TABLET, FILM COATED ORAL
COMMUNITY
Start: 2020-02-10

## 2020-02-25 RX ORDER — LOSARTAN POTASSIUM 25 MG/1
25 TABLET ORAL
COMMUNITY
Start: 2020-02-10 | End: 2020-03-11 | Stop reason: SDUPTHER

## 2020-02-25 NOTE — PROGRESS NOTES
HISTORY OF PRESENT ILLNESS  Jas Asif is a 80 y.o. female here to establish care. Patient has history of diabetes hypertension hyperlipidemia and congestive heart failure along with chronic kidney disease status post SHAJI. Her most recent hospitalization was secondary to SHAJI with CHF. She is being seen by nephrology. She does not have a cardiologist.  She is checking blood sugars before meals which runs around 170 and bedtime blood sugars are in the mid 200s. .  CHF   The history is provided by the medical records, relative and patient. This is a new problem. The problem has been gradually improving. Pertinent negatives include no chest pain, no abdominal pain, no headaches and no shortness of breath. Nothing aggravates the symptoms. The symptoms are relieved by medications. Treatments tried: Cozaar hydralazine and Lasix. Diabetes   The history is provided by the patient, relative and medical records. This is a chronic problem. The problem has been gradually improving. Pertinent negatives include no chest pain, no abdominal pain, no headaches and no shortness of breath. The symptoms are aggravated by eating. The symptoms are relieved by medications. Treatments tried: Insulin. Chronic Kidney Disease   The history is provided by the patient, medical records and relative. This is a chronic problem. The problem has been gradually worsening. Pertinent negatives include no chest pain, no abdominal pain, no headaches and no shortness of breath. Nothing aggravates the symptoms. Nothing relieves the symptoms. She has tried nothing for the symptoms. Cholesterol Problem   The history is provided by the patient and medical records. This is a chronic problem. The problem has been gradually improving. Pertinent negatives include no chest pain, no abdominal pain, no headaches and no shortness of breath. The symptoms are aggravated by eating. Treatments tried: Zocor.      Allergies   Allergen Reactions    Aspirin Nausea Only    Bactrim [Sulfamethoprim] Nausea Only     And dizziness and loss of appetite    Clindamycin Other (comments)     Tongue pain and metallic taste    Pcn [Penicillins] Nausea and Vomiting and Hives    Sulfamethoxazole-Trimethoprim Itching    Tetanus Toxoid,Fluid Hives    Tetracycline Nausea Only    Tetracyclines Other (comments)     Upset stomach    Tramadol Other (comments)     Nausea and vomiting     Current Outpatient Medications on File Prior to Visit   Medication Sig Dispense Refill    furosemide (LASIX) 40 mg tablet Take 40 mg by mouth.  isosorbide mononitrate ER (IMDUR) 60 mg CR tablet Take 60 mg by mouth.  losartan (COZAAR) 25 mg tablet Take 25 mg by mouth.  mirtazapine (REMERON) 15 mg tablet Take 15 mg by mouth.  dextran 70-hypromellose (ARTIFICIAL TEARS,[id number],) ophthalmic solution Administer  to both eyes as needed.  famotidine (PEPCID) 20 mg tablet Take 20 mg by mouth nightly.  amLODIPine (NORVASC) 10 mg tablet TAKE 1 TABLET BY MOUTH ONCE DAILY 90 Tab 0    atenolol (TENORMIN) 50 mg tablet TAKE ONE TABLET BY MOUTH TWICE DAILY 180 Tab 1    clotrimazole (LOTRIMIN) 1 % topical cream Apply  to affected area two (2) times a day. 15 g 2    hydrALAZINE (APRESOLINE) 100 mg tablet TAKE 1 TABLET BY MOUTH THREE TIMES DAILY 270 Tab 5    Blood-Glucose Meter monitoring kit Use as directed to test blood glucose TID 1 Kit 0    glucose blood VI test strips (ASCENSIA AUTODISC VI, ONE TOUCH ULTRA TEST VI) strip Use as directed to test blood glucose  Strip 3    Lancets misc Use as directed to test blood glucose  Each 3    simvastatin (ZOCOR) 20 mg tablet TAKE 1 TABLET BY MOUTH EVERY OTHER DAY 45 Tab 3    [DISCONTINUED] levoFLOXacin (LEVAQUIN) 750 mg tablet Take 1 Tab by mouth daily.  5 Tab 0    [DISCONTINUED] insulin NPH (HUMULIN N NPH U-100 INSULIN) 100 unit/mL injection ADMINISTER 22 UNITS UNDER THE SKIN EVERY MORNING THEN ADMINISTER 12 UNITS UNDER THE SKIN EVERY EVENING 20 mL 2    [DISCONTINUED] insulin lispro (HUMALOG U-100 INSULIN) 100 unit/mL injection INJECT 5 UNITS UNDER THE SKIN BEFORE BREAKFAST. DISCARD AFTER 28 DAYS OF OPENING 10 mL 2    clotrimazole-betamethasone (LOTRISONE) topical cream AAA BID 15 g 0    [DISCONTINUED] losartan-hydroCHLOROthiazide (HYZAAR) 100-25 mg per tablet Take 1 Tab by mouth daily. 90 Tab 5    [DISCONTINUED] ferrous sulfate 325 mg (65 mg iron) tablet TK 1 T PO D  12    betamethasone dipropionate (DIPROSONE) 0.05 % topical cream Apply  to affected area two (2) times a day. 45 g 1     No current facility-administered medications on file prior to visit. Past Medical History:   Diagnosis Date    Anemia     Central stenosis of spinal canal     Severe: L4-L5;  Moderate: L3-L4    Cholelithiasis     CKD (chronic kidney disease), stage III (HCC)     Diabetes mellitus, type 2 ([facility] [address]     HbA1c 6.1% (6/26/2019)    Elevated PTHrP level     x4 measurements    Hiatal hernia     Small    HLD (hyperlipidemia)     Hypertension     Tobacco abuse      Past Surgical History:   Procedure Laterality Date    HX APPENDECTOMY      HX CATARACT REMOVAL Bilateral     HX RETINAL DETACHMENT REPAIR Right 8/27/15     Family History   Problem Relation Age of Onset    Cancer Mother         Lung Cancer    No Known Problems Sister     Liver Disease Son         Hepatitis    No Known Problems Sister      Social History     Socioeconomic History    Marital status:      Spouse name: Not on file    Number of children: Not on file    Years of education: Not on file    Highest education level: Not on file   Occupational History    Not on file   Social Needs    Financial resource strain: Not on file    Food insecurity:     Worry: Not on file     Inability: Not on file    Transportation needs:     Medical: Not on file     Non-medical: Not on file   Tobacco Use    Smoking status: Former Smoker     Packs/day: 0.50     Years: 40.00     Pack years: 20.00    Smokeless tobacco: Former User    Tobacco comment: 0.5 PPD x40+ years; Cutback to 1/4 PPD currently   Substance and Sexual Activity    Alcohol use: No    Drug use: No    Sexual activity: Never   Lifestyle    Physical activity:     Days per week: Not on file     Minutes per session: Not on file    Stress: Not on file   Relationships    Social connections:     Talks on phone: Not on file     Gets together: Not on file     Attends Restorationist service: Not on file     Active member of club or organization: Not on file     Attends meetings of clubs or organizations: Not on file     Relationship status: Not on file    Intimate partner violence:     Fear of current or ex partner: Not on file     Emotionally abused: Not on file     Physically abused: Not on file     Forced sexual activity: Not on file   Other Topics Concern     Service Not Asked    Blood Transfusions Not Asked    Caffeine Concern Not Asked    Occupational Exposure Not Asked   Judene Pill Hazards Not Asked    Sleep Concern Not Asked    Stress Concern Not Asked    Weight Concern Not Asked    Special Diet Not Asked    Back Care Not Asked    Exercise Not Asked    Bike Helmet Not Asked   [address] Not Asked    Self-Exams Not Asked   Social History Narrative    Not on file         Review of Systems   Constitutional: Negative. Eyes: Negative. Respiratory: Negative. Negative for shortness of breath. Cardiovascular: Negative. Negative for chest pain. Gastrointestinal: Negative for abdominal pain. Musculoskeletal: Negative. Neurological: Negative. Negative for headaches. Endo/Heme/Allergies: Negative. Psychiatric/Behavioral: Negative. Visit Vitals  /50 (BP 1 Location: Right arm, BP Patient Position: Sitting)   Pulse (!) 54   Temp 96.9 °F (36.1 °C) (Oral)   Resp 16   Ht 5' 5\" (1.651 m)   Wt 144 lb 6.4 oz (65.5 kg)   SpO2 98%   BMI 24.03 kg/m²     .   Physical Exam  Vitals signs and nursing note reviewed. Constitutional:       Appearance: She is well-developed. HENT:      Head: Normocephalic and atraumatic. Cardiovascular:      Rate and Rhythm: Normal rate and regular rhythm. Heart sounds: Normal heart sounds. No murmur. No friction rub. No gallop. Pulmonary:      Effort: Pulmonary effort is normal. No respiratory distress. Breath sounds: Normal breath sounds. No wheezing or rales. Musculoskeletal: Normal range of motion. General: No tenderness or deformity. Skin:     General: Skin is warm and dry. Coloration: Skin is not pale. Findings: No erythema or rash. Neurological:      Mental Status: She is alert and oriented to person, place, and time. Cranial Nerves: No cranial nerve deficit. Coordination: Coordination normal.   Psychiatric:         Behavior: Behavior normal.         Thought Content: Thought content normal.         Judgment: Judgment normal.         ASSESSMENT and PLAN    ICD-10-CM ICD-9-CM    1. Pure hypercholesterolemia E78.00 272.0 LIPID PANEL      CANCELED: METABOLIC PANEL, COMPREHENSIVE   2. Type 2 diabetes mellitus with nephropathy (HCC) E11.21 250.40 MICROALBUMIN, UR, RAND W/ MICROALB/CREAT RATIO     583.81 URINALYSIS W/ RFLX MICROSCOPIC      CANCELED: METABOLIC PANEL, COMPREHENSIVE   3. Essential hypertension I10 401.9 URINALYSIS W/ RFLX MICROSCOPIC      CANCELED: METABOLIC PANEL, COMPREHENSIVE   4. Stage 4 chronic kidney disease (HCC) N18.4 585.4 CBC WITH AUTOMATED DIFF      URINALYSIS W/ RFLX MICROSCOPIC      CANCELED: METABOLIC PANEL, COMPREHENSIVE   5. Diastolic CHF, chronic ([facility]) I50.32 428.32 REFERRAL TO CARDIOLOGY     428.0    Discussion was had about treatment plan and medications  Time spent was more than 40 minutes . Greater than 50% of which was spent counseling          Follow-up and Dispositions    · Return in about 2 weeks (around 3/10/2020).

## 2020-02-25 NOTE — PROGRESS NOTES
Kev Leal is a 80 y.o. female (: 1933) presenting to address:    Chief Complaint   Patient presents with    [facility] Care    Transitions Of Care       Vitals:    20 1427   BP: 150/50   Pulse: (!) 54   Resp: 16   Temp: 96.9 °F (36.1 °C)   TempSrc: Oral   SpO2: 98%   Weight: 144 lb 6.4 oz (65.5 kg)   Height: 5' 5\" (1.651 m)   PainSc:   0 - No pain       Hearing/Vision:   No exam data present    Learning Assessment:     Learning Assessment 2015   PRIMARY LEARNER Patient   HIGHEST LEVEL OF EDUCATION - PRIMARY LEARNER  GRADUATED HIGH SCHOOL OR GED   PRIMARY LANGUAGE ENGLISH   LEARNER PREFERENCE PRIMARY LISTENING     PICTURES   ANSWERED BY patient   RELATIONSHIP SELF     Depression Screening:     3 most recent PHQ Screens 2020   Little interest or pleasure in doing things Not at all   Feeling down, depressed, irritable, or hopeless Not at all   Total Score PHQ 2 0     Fall Risk Assessment:     Fall Risk Assessment, last 12 mths 2020   Able to walk? Yes   Fall in past 12 months? No     Abuse Screening:     Abuse Screening Questionnaire 3/23/2019   Do you ever feel afraid of your partner? N   Are you in a relationship with someone who physically or mentally threatens you? N   Is it safe for you to go home? Y     Coordination of Care Questionaire:   1. Have you been to the ER, urgent care clinic since your last visit? Hospitalized since your last visit? NO    2. Have you seen or consulted any other health care providers outside of the [address] [place] [address] since your last visit? Include any pap smears or colon screening. NO    Advanced Directive:   1. Do you have an Advanced Directive? YES    2. Would you like information on Advanced Directives?  NO

## 2020-02-26 LAB
BASOPHILS # BLD: 0.1 K/UL (ref 0–0.1)
BASOPHILS NFR BLD: 1 % (ref 0–2)
DIFFERENTIAL METHOD BLD: ABNORMAL
EOSINOPHIL # BLD: 0.5 K/UL (ref 0–0.4)
EOSINOPHIL NFR BLD: 5 % (ref 0–5)
ERYTHROCYTE [DISTWIDTH] IN BLOOD BY AUTOMATED COUNT: 20.9 % (ref 11.6–14.5)
HCT VFR BLD AUTO: 27.7 % (ref 35–45)
HGB BLD-MCNC: 8.9 G/DL (ref 12–16)
LYMPHOCYTES # BLD: 2.5 K/UL (ref 0.9–3.6)
LYMPHOCYTES NFR BLD: 27 % (ref 21–52)
MCH RBC QN AUTO: 24.4 PG (ref 24–34)
MCHC RBC AUTO-ENTMCNC: 32.1 G/DL (ref 31–37)
MCV RBC AUTO: 75.9 FL (ref 74–97)
MONOCYTES # BLD: 1.1 K/UL (ref 0.05–1.2)
MONOCYTES NFR BLD: 12 % (ref 3–10)
NEUTS SEG # BLD: 4.9 K/UL (ref 1.8–8)
NEUTS SEG NFR BLD: 55 % (ref 40–73)
PLATELET # BLD AUTO: 349 K/UL (ref 135–420)
PLATELET COMMENTS,PCOM: ABNORMAL
PMV BLD AUTO: 10.9 FL (ref 9.2–11.8)
RBC # BLD AUTO: 3.65 M/UL (ref 4.2–5.3)
RBC MORPH BLD: ABNORMAL
RBC MORPH BLD: ABNORMAL
WBC # BLD AUTO: 9.1 K/UL (ref 4.6–13.2)

## 2020-03-11 ENCOUNTER — OFFICE VISIT (OUTPATIENT)
Dept: FAMILY MEDICINE CLINIC | Age: 85
End: 2020-03-11

## 2020-03-11 VITALS
OXYGEN SATURATION: 99 % | DIASTOLIC BLOOD PRESSURE: 40 MMHG | WEIGHT: 144 LBS | HEART RATE: 53 BPM | SYSTOLIC BLOOD PRESSURE: 146 MMHG | TEMPERATURE: 95.3 F | HEIGHT: 65 IN | BODY MASS INDEX: 23.99 KG/M2 | RESPIRATION RATE: 16 BRPM

## 2020-03-11 DIAGNOSIS — D64.9 ANEMIA, UNSPECIFIED TYPE: Primary | ICD-10-CM

## 2020-03-11 DIAGNOSIS — E11.21 TYPE 2 DIABETES MELLITUS WITH NEPHROPATHY (HCC): ICD-10-CM

## 2020-03-11 DIAGNOSIS — N18.4 STAGE 4 CHRONIC KIDNEY DISEASE (HCC): ICD-10-CM

## 2020-03-11 DIAGNOSIS — I10 ESSENTIAL HYPERTENSION: ICD-10-CM

## 2020-03-11 DIAGNOSIS — E78.00 PURE HYPERCHOLESTEROLEMIA: ICD-10-CM

## 2020-03-11 RX ORDER — ISOSORBIDE MONONITRATE 60 MG/1
60 TABLET, EXTENDED RELEASE ORAL
Qty: 30 TAB | Refills: 5 | Status: SHIPPED | OUTPATIENT
Start: 2020-03-11

## 2020-03-11 RX ORDER — FUROSEMIDE 40 MG/1
40 TABLET ORAL DAILY
Qty: 30 TAB | Refills: 5 | Status: SHIPPED | OUTPATIENT
Start: 2020-03-11

## 2020-03-11 RX ORDER — SIMVASTATIN 20 MG/1
TABLET, FILM COATED ORAL
Qty: 45 TAB | Refills: 3 | Status: SHIPPED | OUTPATIENT
Start: 2020-03-11

## 2020-03-11 RX ORDER — LOSARTAN POTASSIUM 25 MG/1
25 TABLET ORAL DAILY
Qty: 30 TAB | Refills: 5 | Status: SHIPPED | OUTPATIENT
Start: 2020-03-11

## 2020-03-11 RX ORDER — AMLODIPINE BESYLATE 10 MG/1
TABLET ORAL
Qty: 90 TAB | Refills: 0 | Status: SHIPPED | OUTPATIENT
Start: 2020-03-11 | End: 2020-06-15 | Stop reason: SDUPTHER

## 2020-03-11 RX ORDER — HYDRALAZINE HYDROCHLORIDE 100 MG/1
TABLET, FILM COATED ORAL
Qty: 270 TAB | Refills: 5 | Status: SHIPPED | OUTPATIENT
Start: 2020-03-11

## 2020-03-11 NOTE — PROGRESS NOTES
HISTORY OF PRESENT ILLNESS  Angel Elliott is a 80 y.o. female here for follow-up on diabetes chronic kidney disease hyperlipidemia hypertension and CHF. Labs have been reviewed with patient. EGFR is 28 and hemoglobin is 8.9. Patient states she has a longstanding history of anemia of unknown etiology. .  CHF   The history is provided by the medical records, relative and patient. This is a new problem. The problem has been gradually improving. Pertinent negatives include no chest pain, no abdominal pain, no headaches and no shortness of breath. Nothing aggravates the symptoms. The symptoms are relieved by medications. Treatments tried: Cozaar hydralazine and Lasix. Diabetes   The history is provided by the patient, relative and medical records. This is a chronic problem. The problem has been gradually improving. Pertinent negatives include no chest pain, no abdominal pain, no headaches and no shortness of breath. The symptoms are aggravated by eating. The symptoms are relieved by medications. Treatments tried: Insulin. Chronic Kidney Disease   The history is provided by the patient, medical records and relative. This is a chronic problem. The problem has been gradually worsening. Pertinent negatives include no chest pain, no abdominal pain, no headaches and no shortness of breath. Nothing aggravates the symptoms. Nothing relieves the symptoms. She has tried nothing for the symptoms. Cholesterol Problem   The history is provided by the patient and medical records. This is a chronic problem. The problem has been gradually improving. Pertinent negatives include no chest pain, no abdominal pain, no headaches and no shortness of breath. The symptoms are aggravated by eating. Treatments tried: Zocor. Anemia   The history is provided by the patient and medical records. This is a chronic problem. The problem has not changed since onset. Pertinent negatives include no chest pain, no abdominal pain, no headaches and no shortness of breath. Nothing aggravates the symptoms. Nothing relieves the symptoms. She has tried nothing for the symptoms. Hypertension    The history is provided by the patient and medical records. This is a chronic problem. The problem has been gradually improving. Pertinent negatives include no chest pain, no orthopnea, no headaches, no peripheral edema, no dizziness and no shortness of breath. There are no associated agents to hypertension. Risk factors include diabetes mellitus, a sedentary lifestyle, postmenopause and dyslipidemia. Allergies   Allergen Reactions    Aspirin Nausea Only    Bactrim [Sulfamethoprim] Nausea Only     And dizziness and loss of appetite    Clindamycin Other (comments)     Tongue pain and metallic taste    Pcn [Penicillins] Nausea and Vomiting and Hives    Sulfamethoxazole-Trimethoprim Itching    Tetanus Toxoid,Fluid Hives    Tetracycline Nausea Only    Tetracyclines Other (comments)     Upset stomach    Tramadol Other (comments)     Nausea and vomiting     Current Outpatient Medications on File Prior to Visit   Medication Sig Dispense Refill    mirtazapine (REMERON) 15 mg tablet Take 15 mg by mouth.  dextran 70-hypromellose (ARTIFICIAL TEARS,[id number],) ophthalmic solution Administer  to both eyes as needed.  insulin NPH (HUMULIN N NPH U-100 INSULIN) 100 unit/mL injection Inject 5 units once in the a.m. and once in the p.m. 20 mL 2    famotidine (PEPCID) 20 mg tablet Take 20 mg by mouth nightly.  atenolol (TENORMIN) 50 mg tablet TAKE ONE TABLET BY MOUTH TWICE DAILY 180 Tab 1    clotrimazole (LOTRIMIN) 1 % topical cream Apply  to affected area two (2) times a day.  15 g 2    clotrimazole-betamethasone (LOTRISONE) topical cream AAA BID 15 g 0    Blood-Glucose Meter monitoring kit Use as directed to test blood glucose TID 1 Kit 0    glucose blood VI test strips (ASCENSIA AUTODISC VI, ONE TOUCH ULTRA TEST VI) strip Use as directed to test blood glucose  Strip 3    Lancets misc Use as directed to test blood glucose  Each 3    betamethasone dipropionate (DIPROSONE) 0.05 % topical cream Apply  to affected area two (2) times a day. 45 g 1    [DISCONTINUED] furosemide (LASIX) 40 mg tablet Take 40 mg by mouth.  [DISCONTINUED] isosorbide mononitrate ER (IMDUR) 60 mg CR tablet Take 60 mg by mouth.  [DISCONTINUED] losartan (COZAAR) 25 mg tablet Take 25 mg by mouth.  [DISCONTINUED] amLODIPine (NORVASC) 10 mg tablet TAKE 1 TABLET BY MOUTH ONCE DAILY 90 Tab 0    [DISCONTINUED] hydrALAZINE (APRESOLINE) 100 mg tablet TAKE 1 TABLET BY MOUTH THREE TIMES DAILY 270 Tab 5    [DISCONTINUED] simvastatin (ZOCOR) 20 mg tablet TAKE 1 TABLET BY MOUTH EVERY OTHER DAY 45 Tab 3     No current facility-administered medications on file prior to visit. Past Medical History:   Diagnosis Date    Anemia     Central stenosis of spinal canal     Severe: L4-L5;  Moderate: L3-L4    Cholelithiasis     CKD (chronic kidney disease), stage III (HCC)     Diabetes mellitus, type 2 (HCC)     HbA1c 6.1% (6/26/2019)    Elevated PTHrP level     x4 measurements    Hiatal hernia     Small    HLD (hyperlipidemia)     Hypertension     Tobacco abuse      Past Surgical History:   Procedure Laterality Date    HX APPENDECTOMY      HX CATARACT REMOVAL Bilateral     HX RETINAL DETACHMENT REPAIR Right 8/27/15     Family History   Problem Relation Age of Onset    Cancer Mother         Lung Cancer    No Known Problems Sister     Liver Disease Son         Hepatitis    No Known Problems Sister      Social History     Socioeconomic History    Marital status:      Spouse name: Not on file    Number of children: Not on file    Years of education: Not on file    Highest education level: Not on file   Occupational History    Not on file   Social Needs    Financial resource strain: Not on file    Food insecurity     Worry: Not on file     Inability: Not on file    Transportation needs     Medical: Not on file     Non-medical: Not on file   Tobacco Use    Smoking status: Former Smoker     Packs/day: 0.50     Years: 40.00     Pack years: 20.00    Smokeless tobacco: Former User    Tobacco comment: 0.5 PPD x40+ years; Cutback to 1/4 PPD currently   Substance and Sexual Activity    Alcohol use: No    Drug use: No    Sexual activity: Never   Lifestyle    Physical activity     Days per week: Not on file     Minutes per session: Not on file    Stress: Not on file   Relationships    Social connections     Talks on phone: Not on file     Gets together: Not on file     Attends Bahai service: Not on file     Active member of club or organization: Not on file     Attends meetings of clubs or organizations: Not on file     Relationship status: Not on file    Intimate partner violence     Fear of current or ex partner: Not on file     Emotionally abused: Not on file     Physically abused: Not on file     Forced sexual activity: Not on file   Other Topics Concern     Service Not Asked    Blood Transfusions Not Asked    Caffeine Concern Not Asked    Occupational Exposure Not Asked   Soila Plate Hazards Not Asked    Sleep Concern Not Asked    Stress Concern Not Asked    Weight Concern Not Asked    Special Diet Not Asked    Back Care Not Asked    Exercise Not Asked    Bike Helmet Not Asked   [address] Not Asked    Self-Exams Not Asked   Social History Narrative    Not on file       Review of Systems   Constitutional: Negative. Eyes: Negative. Respiratory: Negative. Negative for shortness of breath. Cardiovascular: Negative. Negative for chest pain and orthopnea. Gastrointestinal: Negative for abdominal pain. Musculoskeletal: Negative. Neurological: Negative. Negative for dizziness and headaches. Endo/Heme/Allergies: Negative. Psychiatric/Behavioral: Negative.       Visit Vitals  /40 (BP 1 Location: Right arm, BP Patient Position: Sitting)   Pulse (!) 53   Temp 95.3 °F (35.2 °C) (Oral)   Resp 16   Ht 5' 5\" (1.651 m)   Wt 144 lb (65.3 kg)   SpO2 99%   BMI 23.96 kg/m²       Physical Exam  Vitals signs and nursing note reviewed. Constitutional:       Appearance: She is well-developed. HENT:      Head: Normocephalic and atraumatic. Cardiovascular:      Rate and Rhythm: Normal rate and regular rhythm. Heart sounds: Normal heart sounds. No murmur. No friction rub. No gallop. Pulmonary:      Effort: Pulmonary effort is normal. No respiratory distress. Breath sounds: Normal breath sounds. No wheezing or rales. Musculoskeletal: Normal range of motion. General: No tenderness or deformity. Skin:     General: Skin is warm and dry. Coloration: Skin is not pale. Findings: No erythema or rash. Neurological:      Mental Status: She is alert and oriented to person, place, and time. Cranial Nerves: No cranial nerve deficit. Coordination: Coordination normal.   Psychiatric:         Behavior: Behavior normal.         Thought Content: Thought content normal.         Judgment: Judgment normal.         ASSESSMENT and PLAN    ICD-10-CM ICD-9-CM    1. Anemia, unspecified type D64.9 285.9 CBC WITH AUTOMATED DIFF      OCCULT BLOOD IMMUNOASSAY,DIAGNOSTIC      HEMOGLOBIN FRACTIONATION   2. Essential hypertension I10 401.9    3. Type 2 diabetes mellitus with nephropathy (HCC) E11.21 250.40      583.81    4. Pure hypercholesterolemia E78.00 272.0    5. Stage [facility] N18.4 585.4      Follow-up and Dispositions    · Return in about 4 weeks (around 4/8/2020).

## 2020-03-11 NOTE — PROGRESS NOTES
Candice Aiken is a 80 y.o. female (: 1933) presenting to address:    Chief Complaint   Patient presents with    CHF    Diabetes    Chronic Kidney Disease    Cholesterol Problem       Vitals:    20 1456   BP: 146/40   Pulse: (!) 53   Resp: 16   Temp: 95.3 °F (35.2 °C)   TempSrc: Oral   SpO2: 99%   Weight: 144 lb (65.3 kg)   Height: 5' 5\" (1.651 m)   PainSc:   0 - No pain       Hearing/Vision:   No exam data present    Learning Assessment:     Learning Assessment 2015   PRIMARY LEARNER Patient   HIGHEST LEVEL OF EDUCATION - PRIMARY LEARNER  GRADUATED HIGH SCHOOL OR GED   PRIMARY LANGUAGE ENGLISH   LEARNER PREFERENCE PRIMARY LISTENING     PICTURES   ANSWERED BY patient   RELATIONSHIP SELF     Depression Screening:     3 most recent PHQ Screens 3/11/2020   Little interest or pleasure in doing things Not at all   Feeling down, depressed, irritable, or hopeless Not at all   Total Score PHQ 2 0     Fall Risk Assessment:     Fall Risk Assessment, last 12 mths 3/11/2020   Able to walk? Yes   Fall in past 12 months? No     Abuse Screening:     Abuse Screening Questionnaire 3/23/2019   Do you ever feel afraid of your partner? N   Are you in a relationship with someone who physically or mentally threatens you? N   Is it safe for you to go home? Y     Coordination of Care Questionaire:   1. Have you been to the ER, urgent care clinic since your last visit? Hospitalized since your last visit? NO    2. Have you seen or consulted any other health care providers outside of the [address] [place] [address] since your last visit? Include any pap smears or colon screening. NO    Advanced Directive:   1. Do you have an Advanced Directive? YES    2. Would you like information on Advanced Directives?  NO

## 2020-04-01 ENCOUNTER — HOSPITAL ENCOUNTER (OUTPATIENT)
Dept: LAB | Age: 85
Discharge: HOME | End: 2020-04-01
Payer: MEDICARE

## 2020-04-01 LAB
25(OH)D3 SERPL-MCNC: 15.3 NG/ML (ref 30–100)
ALBUMIN SERPL-MCNC: 3.5 G/DL (ref 3.4–5)
ANION GAP SERPL CALC-SCNC: 7 MMOL/L (ref 3–18)
BUN SERPL-MCNC: 41 MG/DL (ref 7–18)
BUN/CREAT SERPL: 21 (ref 12–20)
CALCIUM SERPL-MCNC: 8.8 MG/DL (ref 8.5–10.1)
CALCIUM SERPL-MCNC: 8.9 MG/DL (ref 8.5–10.1)
CHLORIDE SERPL-SCNC: 102 MMOL/L (ref 100–111)
CO2 SERPL-SCNC: 25 MMOL/L (ref 21–32)
CREAT SERPL-MCNC: 1.98 MG/DL (ref 0.6–1.3)
CREAT UR-MCNC: 53.7 MG/DL (ref 30–125)
ERYTHROCYTE [DISTWIDTH] IN BLOOD BY AUTOMATED COUNT: 21.2 % (ref 11.6–14.5)
GLUCOSE SERPL-MCNC: 168 MG/DL (ref 74–99)
HCT VFR BLD AUTO: 29.5 % (ref 35–45)
HGB BLD-MCNC: 9.5 G/DL (ref 12–16)
MAGNESIUM SERPL-MCNC: 1.9 MG/DL (ref 1.6–2.6)
MCH RBC QN AUTO: 24.4 PG (ref 24–34)
MCHC RBC AUTO-ENTMCNC: 32.2 G/DL (ref 31–37)
MCV RBC AUTO: 75.6 FL (ref 74–97)
PHOSPHATE SERPL-MCNC: 3.5 MG/DL (ref 2.5–4.9)
PLATELET # BLD AUTO: 231 K/UL (ref 135–420)
POTASSIUM SERPL-SCNC: 4.2 MMOL/L (ref 3.5–5.5)
PROT UR-MCNC: 75 MG/DL
PTH-INTACT SERPL-MCNC: 200.8 PG/ML (ref 18.4–88)
RBC # BLD AUTO: 3.9 M/UL (ref 4.2–5.3)
SODIUM SERPL-SCNC: 134 MMOL/L (ref 136–145)
WBC # BLD AUTO: 10.8 K/UL (ref 4.6–13.2)

## 2020-04-01 PROCEDURE — 83735 ASSAY OF MAGNESIUM: CPT

## 2020-04-01 PROCEDURE — 84100 ASSAY OF PHOSPHORUS: CPT

## 2020-04-01 PROCEDURE — 85027 COMPLETE CBC AUTOMATED: CPT

## 2020-04-01 PROCEDURE — 82040 ASSAY OF SERUM ALBUMIN: CPT

## 2020-04-01 PROCEDURE — 80048 BASIC METABOLIC PNL TOTAL CA: CPT

## 2020-04-01 PROCEDURE — 84156 ASSAY OF PROTEIN URINE: CPT

## 2020-04-01 PROCEDURE — 82306 VITAMIN D 25 HYDROXY: CPT

## 2020-04-01 PROCEDURE — 36415 COLL VENOUS BLD VENIPUNCTURE: CPT

## 2020-04-01 PROCEDURE — 82570 ASSAY OF URINE CREATININE: CPT

## 2020-04-01 PROCEDURE — 83970 ASSAY OF PARATHORMONE: CPT

## 2020-04-20 ENCOUNTER — VIRTUAL VISIT (OUTPATIENT)
Dept: FAMILY MEDICINE CLINIC | Age: 85
End: 2020-04-20

## 2020-04-20 DIAGNOSIS — E78.00 PURE HYPERCHOLESTEROLEMIA: ICD-10-CM

## 2020-04-20 DIAGNOSIS — D64.9 ANEMIA, UNSPECIFIED TYPE: Primary | ICD-10-CM

## 2020-04-20 DIAGNOSIS — E11.21 TYPE 2 DIABETES MELLITUS WITH NEPHROPATHY (HCC): ICD-10-CM

## 2020-04-20 DIAGNOSIS — I10 ESSENTIAL HYPERTENSION: ICD-10-CM

## 2020-04-20 DIAGNOSIS — N18.4 STAGE 4 CHRONIC KIDNEY DISEASE (HCC): ICD-10-CM

## 2020-04-20 RX ORDER — INSULIN LISPRO 100 [IU]/ML
INJECTION, SOLUTION INTRAVENOUS; SUBCUTANEOUS
Qty: 1 VIAL | Refills: 2
Start: 2020-04-20 | End: 2020-04-24 | Stop reason: SDUPTHER

## 2020-04-20 NOTE — PROGRESS NOTES
HISTORY OF PRESENT ILLNESS  Lucia Contreras is a 80 y.o. female who presents today with history of diabetes chronic kidney disease hyperlipidemia anemia and hypertension. Patient states that she is taking  NPH insulin 5 units twice daily mixed with Humalog which she mixes herself. She then tells me she is only using NPH and is not mixing Humalog. She tells me that her fasting blood sugars are in the low 100s and 2 hours after breakfast and lunch are in the mid to high 200s. Blood sugars after dinner are around 160. She seems uncertain about exactly what she is taking currently. She does not want to change her current insulin regimen. Consent:  he and/or  healthcare decision maker is aware that this patient-initiated Telehealth encounter is a billable service, with coverage as determined by her insurance carrier. he is aware that she may receive a bill and has provided verbal consent to proceed: Yes    I was at home while conducting this encounter. CHF   The history is provided by the medical records and patient. This is a new problem. The problem has been gradually improving. Pertinent negatives include no chest pain, no abdominal pain, no headaches and no shortness of breath. Nothing aggravates the symptoms. The symptoms are relieved by medications. Treatments tried: Cozaar hydralazine and Lasix. Diabetes   The history is provided by the patient and medical records. This is a chronic problem. The problem has been gradually worsening. Pertinent negatives include no chest pain, no abdominal pain, no headaches and no shortness of breath. The symptoms are aggravated by eating. The symptoms are relieved by medications. Treatments tried: Insulin. Chronic Kidney Disease   The history is provided by the patient and medical records. This is a chronic problem. The problem has been gradually worsening. Pertinent negatives include no chest pain, no abdominal pain, no headaches and no shortness of breath. Nothing aggravates the symptoms. Nothing relieves the symptoms. She has tried nothing for the symptoms. Cholesterol Problem   The history is provided by the patient and medical records. This is a chronic problem. The problem has been gradually improving. Pertinent negatives include no chest pain, no abdominal pain, no headaches and no shortness of breath. The symptoms are aggravated by eating. Treatments tried: Zocor. Anemia   The history is provided by the patient and medical records. This is a chronic problem. The problem has not changed since onset. Pertinent negatives include no chest pain, no abdominal pain, no headaches and no shortness of breath. Nothing aggravates the symptoms. Nothing relieves the symptoms. She has tried nothing for the symptoms. Hypertension    The history is provided by the patient and medical records. This is a chronic problem. The problem has been gradually improving. Pertinent negatives include no chest pain, no orthopnea, no headaches, no peripheral edema, no dizziness and no shortness of breath. There are no associated agents to hypertension. Risk factors include diabetes mellitus, a sedentary lifestyle, postmenopause and dyslipidemia. Allergies   Allergen Reactions    Aspirin Nausea Only    Bactrim [Sulfamethoprim] Nausea Only     And dizziness and loss of appetite    Clindamycin Other (comments)     Tongue pain and metallic taste    Pcn [Penicillins] Nausea and Vomiting and Hives    Sulfamethoxazole-Trimethoprim Itching    Tetanus Toxoid,Fluid Hives    Tetracycline Nausea Only    Tetracyclines Other (comments)     Upset stomach    Tramadol Other (comments)     Nausea and vomiting     Current Outpatient Medications on File Prior to Visit   Medication Sig Dispense Refill    amLODIPine (NORVASC) 10 mg tablet TAKE 1 TABLET BY MOUTH ONCE DAILY 90 Tab 0    furosemide (LASIX) 40 mg tablet Take 1 Tab by mouth daily.  30 Tab 5    losartan (COZAAR) 25 mg tablet Take 1 Tab by mouth daily. 30 Tab 5    isosorbide mononitrate ER (IMDUR) 60 mg CR tablet Take 1 Tab by mouth every morning. 30 Tab 5    simvastatin (ZOCOR) 20 mg tablet TAKE 1 TABLET BY MOUTH EVERY OTHER DAY 45 Tab 3    hydrALAZINE (APRESOLINE) 100 mg tablet TAKE 1 TABLET BY MOUTH THREE TIMES DAILY 270 Tab 5    linaGLIPtin (TRADJENTA) 5 mg tablet Take 1 Tab by mouth daily. 30 Tab 5    mirtazapine (REMERON) 15 mg tablet Take 15 mg by mouth.  dextran 70-hypromellose (ARTIFICIAL TEARS,[id number],) ophthalmic solution Administer  to both eyes as needed.  insulin NPH (HUMULIN N NPH U-100 INSULIN) 100 unit/mL injection Inject 5 units once in the a.m. and once in the p.m. 20 mL 2    famotidine (PEPCID) 20 mg tablet Take 20 mg by mouth nightly.  atenolol (TENORMIN) 50 mg tablet TAKE ONE TABLET BY MOUTH TWICE DAILY 180 Tab 1    clotrimazole (LOTRIMIN) 1 % topical cream Apply  to affected area two (2) times a day. 15 g 2    clotrimazole-betamethasone (LOTRISONE) topical cream AAA BID 15 g 0    Blood-Glucose Meter monitoring kit Use as directed to test blood glucose TID 1 Kit 0    glucose blood VI test strips (ASCENSIA AUTODISC VI, ONE TOUCH ULTRA TEST VI) strip Use as directed to test blood glucose  Strip 3    Lancets misc Use as directed to test blood glucose  Each 3    betamethasone dipropionate (DIPROSONE) 0.05 % topical cream Apply  to affected area two (2) times a day. 45 g 1     No current facility-administered medications on file prior to visit. Past Medical History:   Diagnosis Date    Anemia     Central stenosis of spinal canal     Severe: L4-L5;  Moderate: L3-L4    Cholelithiasis     CKD (chronic kidney disease), stage III (HCC)     [facility]     HbA1c 6.1% (6/26/2019)    Elevated PTHrP level     x4 measurements    Hiatal hernia     Small    HLD (hyperlipidemia)     Hypertension     Tobacco abuse      Past Surgical History:   Procedure Laterality Date    HX APPENDECTOMY      HX CATARACT REMOVAL Bilateral     HX RETINAL DETACHMENT REPAIR Right 8/27/15     Family History   Problem Relation Age of Onset    Cancer Mother         Lung Cancer    No Known Problems Sister     Liver Disease Son         Hepatitis    No Known Problems Sister      Social History     Socioeconomic History    Marital status:      Spouse name: Not on file    Number of children: Not on file    Years of education: Not on file    Highest education level: Not on file   Occupational History    Not on file   Social Needs    Financial resource strain: Not on file    Food insecurity     Worry: Not on file     Inability: Not on file    Transportation needs     Medical: Not on file     Non-medical: Not on file   Tobacco Use    Smoking status: Former Smoker     Packs/day: 0.50     Years: 40.00     Pack years: 20.00    Smokeless tobacco: Former User    Tobacco comment: 0.5 PPD x40+ years; Cutback to 1/4 PPD currently   Substance and Sexual Activity    Alcohol use: No    Drug use: No    Sexual activity: Never   Lifestyle    Physical activity     Days per week: Not on file     Minutes per session: Not on file    Stress: Not on file   Relationships    Social connections     Talks on phone: Not on file     Gets together: Not on file     Attends Sikhism service: Not on file     Active member of club or organization: Not on file     Attends meetings of clubs or organizations: Not on file     Relationship status: Not on file    Intimate partner violence     Fear of current or ex partner: Not on file     Emotionally abused: Not on file     Physically abused: Not on file     Forced sexual activity: Not on file   Other Topics Concern     Service Not Asked    Blood Transfusions Not Asked    Caffeine Concern Not Asked    Occupational Exposure Not Asked   Jerman [employer] Hazards Not Asked    Sleep Concern Not Asked    Stress Concern Not Asked    Weight Concern Not Asked    Special Diet Not Asked    Back Care Not Asked    Exercise Not Asked    Bike Helmet Not Asked   [address] [facility] Not Asked    Self-Exams Not Asked   Social History Narrative    Not on file         Review of Systems   Constitutional: Negative. Eyes: Negative. Respiratory: Negative. Negative for shortness of breath. Cardiovascular: Negative. Negative for chest pain and orthopnea. Gastrointestinal: Negative for abdominal pain. Musculoskeletal: Negative. Neurological: Negative. Negative for dizziness and headaches. Endo/Heme/Allergies: Negative. Psychiatric/Behavioral: Negative. There were no vitals taken for this visit. Physical Exam  Constitutional:       Appearance: She is well-developed. HENT:      Head: Normocephalic and atraumatic. Pulmonary:      Effort: Pulmonary effort is normal. No respiratory distress. Musculoskeletal: Normal range of motion. General: No tenderness or deformity. Skin:     General: Skin is dry. Coloration: Skin is not pale. Findings: No erythema or rash. Neurological:      Mental Status: She is alert and oriented to person, place, and time. Cranial Nerves: No cranial nerve deficit. Coordination: Coordination normal.   Psychiatric:         Behavior: Behavior normal.         Thought Content: Thought content normal.         Judgment: Judgment normal.         ASSESSMENT and PLAN    ICD-10-CM ICD-9-CM    1. Anemia, unspecified type D64.9 285.9 HEMOGLOBIN FRACTIONATION   2. Essential hypertension I10 401.9    3. Type 2 diabetes mellitus with nephropathy (HCC) E11.21 250.40      583.81    4. Stage 4 chronic kidney disease (HCC) N18.4 585.4    5. Pure hypercholesterolemia E78.00 272.0    I had a long discussion with patient. She vacillates between telling me that she is using 5 units of NPH insulin twice daily and she is mixing that with Humalog.   I further questioned her further about that she denies mixing any insulins dates that is what she used to do. I explained that I would prefer her to use one long-acting insulin along with mealtime insulin to get better control. She refused. Offered an opportunity for her to be evaluated by endocrinology. She again refused. Asked her to call the office to make an appointment to have blood work done and to bring in all of her medications so we can reconcile all of them especially insulin. If she is using NPH insulin 5 units twice daily and does not want to change that regimen I would then add 3 units of Humalog with breakfast and lunch only. Prescription for this Humalog has been sent to her pharmacy. Time spent was more than 40 minutes. Greater than 50% of which was spent counseling. We discussed the expected course, resolution and complications of the diagnosis(es) in detail. Medication risks, benefits, costs, interactions, and alternatives were discussed as indicated. I advised her to contact the office if her condition worsens, changes or fails to improve as anticipated. She expressed understanding with the diagnosis(es) and plan. Pursuant to the emergency declaration under the [facility] waiver authority and the [employer] Act, this Virtual  Visit was conducted, with patient's consent, to reduce the patient's risk of exposure to COVID-19 and provide continuity of care for an established patient. Services were provided through a video synchronous discussion virtually to substitute for in-person clinic visit.     Stevo Vazquez MD

## 2020-04-24 RX ORDER — INSULIN LISPRO 100 [IU]/ML
INJECTION, SOLUTION INTRAVENOUS; SUBCUTANEOUS
Qty: 1 VIAL | Refills: 1
Start: 2020-04-24 | End: 2020-05-04 | Stop reason: SDUPTHER

## 2020-04-24 NOTE — TELEPHONE ENCOUNTER
Pharmacy stated that they did not receive prescription, could med be sent to [facility] on [address]

## 2020-05-04 RX ORDER — INSULIN LISPRO 100 [IU]/ML
INJECTION, SOLUTION INTRAVENOUS; SUBCUTANEOUS
Qty: 1 VIAL | Refills: 1
Start: 2020-05-04

## 2020-06-02 ENCOUNTER — HOSPITAL ENCOUNTER (OUTPATIENT)
Dept: LAB | Age: 85
Discharge: HOME | End: 2020-06-02

## 2020-06-02 ENCOUNTER — HOSPITAL ENCOUNTER (OUTPATIENT)
Dept: LAB | Age: 85
Discharge: HOME | End: 2020-06-02
Payer: MEDICARE

## 2020-06-02 DIAGNOSIS — R80.9 PROTEINURIA: ICD-10-CM

## 2020-06-02 DIAGNOSIS — E66.8 OBESITY OF ENDOCRINE ORIGIN: ICD-10-CM

## 2020-06-02 DIAGNOSIS — N18.9 CHRONIC KIDNEY DISEASE, UNSPECIFIED: ICD-10-CM

## 2020-06-02 LAB
25(OH)D3 SERPL-MCNC: 19.8 NG/ML (ref 30–100)
ALBUMIN SERPL-MCNC: 3.4 G/DL (ref 3.4–5)
ANION GAP SERPL CALC-SCNC: 8 MMOL/L (ref 3–18)
BUN SERPL-MCNC: 41 MG/DL (ref 7–18)
BUN/CREAT SERPL: 20 (ref 12–20)
CALCIUM SERPL-MCNC: 8.8 MG/DL (ref 8.5–10.1)
CALCIUM SERPL-MCNC: 8.9 MG/DL (ref 8.5–10.1)
CHLORIDE SERPL-SCNC: 103 MMOL/L (ref 100–111)
CO2 SERPL-SCNC: 26 MMOL/L (ref 21–32)
CREAT SERPL-MCNC: 2.03 MG/DL (ref 0.6–1.3)
CREAT UR-MCNC: 60 MG/DL (ref 30–125)
ERYTHROCYTE [DISTWIDTH] IN BLOOD BY AUTOMATED COUNT: 19.4 % (ref 11.6–14.5)
GLUCOSE SERPL-MCNC: 143 MG/DL (ref 74–99)
HCT VFR BLD AUTO: 29.4 % (ref 35–45)
HGB BLD-MCNC: 9.5 G/DL (ref 12–16)
MAGNESIUM SERPL-MCNC: 2 MG/DL (ref 1.6–2.6)
MCH RBC QN AUTO: 24.7 PG (ref 24–34)
MCHC RBC AUTO-ENTMCNC: 32.3 G/DL (ref 31–37)
MCV RBC AUTO: 76.4 FL (ref 74–97)
PHOSPHATE SERPL-MCNC: 3.9 MG/DL (ref 2.5–4.9)
PLATELET # BLD AUTO: 293 K/UL (ref 135–420)
POTASSIUM SERPL-SCNC: 3.9 MMOL/L (ref 3.5–5.5)
PROT UR-MCNC: 61 MG/DL
PTH-INTACT SERPL-MCNC: 214.9 PG/ML (ref 18.4–88)
RBC # BLD AUTO: 3.85 M/UL (ref 4.2–5.3)
SODIUM SERPL-SCNC: 137 MMOL/L (ref 136–145)
WBC # BLD AUTO: 8.6 K/UL (ref 4.6–13.2)

## 2020-06-02 PROCEDURE — 36415 COLL VENOUS BLD VENIPUNCTURE: CPT

## 2020-06-02 PROCEDURE — 82040 ASSAY OF SERUM ALBUMIN: CPT

## 2020-06-02 PROCEDURE — 82306 VITAMIN D 25 HYDROXY: CPT

## 2020-06-02 PROCEDURE — 84100 ASSAY OF PHOSPHORUS: CPT

## 2020-06-02 PROCEDURE — 84156 ASSAY OF PROTEIN URINE: CPT

## 2020-06-02 PROCEDURE — 82570 ASSAY OF URINE CREATININE: CPT

## 2020-06-02 PROCEDURE — 83735 ASSAY OF MAGNESIUM: CPT

## 2020-06-02 PROCEDURE — 83970 ASSAY OF PARATHORMONE: CPT

## 2020-06-02 PROCEDURE — 85027 COMPLETE CBC AUTOMATED: CPT

## 2020-06-02 PROCEDURE — 80048 BASIC METABOLIC PNL TOTAL CA: CPT

## 2020-06-18 ENCOUNTER — TELEPHONE (OUTPATIENT)
Dept: FAMILY MEDICINE CLINIC | Age: 85
End: 2020-06-18

## 2020-06-18 NOTE — TELEPHONE ENCOUNTER
Patient stated that they never received termination letter.  I did inform patient and patients son the she has been discharged from practice as of 4/22/2020 and last day for acute care was 5/22/2020

## 2020-10-05 ENCOUNTER — HOSPITAL ENCOUNTER (OUTPATIENT)
Dept: LAB | Age: 85
Discharge: HOME | End: 2020-10-05
Payer: MEDICARE

## 2020-10-05 ENCOUNTER — TRANSCRIBE ORDER (OUTPATIENT)
Dept: REGISTRATION | Age: 85
End: 2020-10-05

## 2020-10-05 DIAGNOSIS — E55.9 AVITAMINOSIS D: ICD-10-CM

## 2020-10-05 DIAGNOSIS — R80.9 PROTEINURIA: ICD-10-CM

## 2020-10-05 DIAGNOSIS — E66.8 OBESITY OF ENDOCRINE ORIGIN: ICD-10-CM

## 2020-10-05 DIAGNOSIS — N18.30 CHRONIC KIDNEY DISEASE, STAGE III (MODERATE) (HCC): Primary | ICD-10-CM

## 2020-10-05 DIAGNOSIS — N18.30 CHRONIC KIDNEY DISEASE, STAGE III (MODERATE) (HCC): ICD-10-CM

## 2020-10-05 LAB
25(OH)D3 SERPL-MCNC: 15.8 NG/ML (ref 30–100)
ALBUMIN SERPL-MCNC: 3.6 G/DL (ref 3.4–5)
ANION GAP SERPL CALC-SCNC: 7 MMOL/L (ref 3–18)
BUN SERPL-MCNC: 31 MG/DL (ref 7–18)
BUN/CREAT SERPL: 16 (ref 12–20)
CALCIUM SERPL-MCNC: 9.1 MG/DL (ref 8.5–10.1)
CALCIUM SERPL-MCNC: 9.5 MG/DL (ref 8.5–10.1)
CHLORIDE SERPL-SCNC: 103 MMOL/L (ref 100–111)
CO2 SERPL-SCNC: 27 MMOL/L (ref 21–32)
CREAT SERPL-MCNC: 1.97 MG/DL (ref 0.6–1.3)
CREAT UR-MCNC: 47 MG/DL (ref 30–125)
ERYTHROCYTE [DISTWIDTH] IN BLOOD BY AUTOMATED COUNT: 18.9 % (ref 11.6–14.5)
GLUCOSE SERPL-MCNC: 175 MG/DL (ref 74–99)
HCT VFR BLD AUTO: 29.4 % (ref 35–45)
HGB BLD-MCNC: 9.6 G/DL (ref 12–16)
MAGNESIUM SERPL-MCNC: 1.9 MG/DL (ref 1.6–2.6)
MCH RBC QN AUTO: 24.7 PG (ref 24–34)
MCHC RBC AUTO-ENTMCNC: 32.7 G/DL (ref 31–37)
MCV RBC AUTO: 75.8 FL (ref 74–97)
PHOSPHATE SERPL-MCNC: 3.7 MG/DL (ref 2.5–4.9)
PLATELET # BLD AUTO: 206 K/UL (ref 135–420)
POTASSIUM SERPL-SCNC: 4 MMOL/L (ref 3.5–5.5)
PROT UR-MCNC: 65 MG/DL
PROT/CREAT UR-RTO: 1.4
PTH-INTACT SERPL-MCNC: 196 PG/ML (ref 18.4–88)
RBC # BLD AUTO: 3.88 M/UL (ref 4.2–5.3)
SODIUM SERPL-SCNC: 137 MMOL/L (ref 136–145)
WBC # BLD AUTO: 9.8 K/UL (ref 4.6–13.2)

## 2020-10-05 PROCEDURE — 36415 COLL VENOUS BLD VENIPUNCTURE: CPT

## 2020-10-05 PROCEDURE — 83735 ASSAY OF MAGNESIUM: CPT

## 2020-10-05 PROCEDURE — 83970 ASSAY OF PARATHORMONE: CPT

## 2020-10-05 PROCEDURE — 82306 VITAMIN D 25 HYDROXY: CPT

## 2020-10-05 PROCEDURE — 84156 ASSAY OF PROTEIN URINE: CPT

## 2020-10-05 PROCEDURE — 85027 COMPLETE CBC AUTOMATED: CPT

## 2020-10-05 PROCEDURE — 82040 ASSAY OF SERUM ALBUMIN: CPT

## 2020-10-05 PROCEDURE — 80048 BASIC METABOLIC PNL TOTAL CA: CPT

## 2020-10-05 PROCEDURE — 84100 ASSAY OF PHOSPHORUS: CPT

## 2021-03-23 ENCOUNTER — DOCUMENTATION ONLY (OUTPATIENT)
Dept: FAMILY MEDICINE CLINIC | Age: 86
End: 2021-03-23

## 2021-03-23 NOTE — PROGRESS NOTES
I called patient to update depression screening. Patient stated she was no longer going to see Dr. Prem Snyder and she is between doctors at this time.